# Patient Record
Sex: FEMALE | Race: WHITE | NOT HISPANIC OR LATINO | Employment: OTHER | RURAL
[De-identification: names, ages, dates, MRNs, and addresses within clinical notes are randomized per-mention and may not be internally consistent; named-entity substitution may affect disease eponyms.]

---

## 2017-05-03 ENCOUNTER — OFFICE VISIT (OUTPATIENT)
Dept: NEUROLOGY | Facility: CLINIC | Age: 47
End: 2017-05-03

## 2017-05-03 VITALS
SYSTOLIC BLOOD PRESSURE: 132 MMHG | HEIGHT: 63 IN | OXYGEN SATURATION: 100 % | WEIGHT: 130 LBS | HEART RATE: 75 BPM | DIASTOLIC BLOOD PRESSURE: 93 MMHG | BODY MASS INDEX: 23.04 KG/M2

## 2017-05-03 DIAGNOSIS — F51.04 CHRONIC INSOMNIA: ICD-10-CM

## 2017-05-03 DIAGNOSIS — R41.3 MEMORY LOSS: Primary | ICD-10-CM

## 2017-05-03 DIAGNOSIS — G89.4 CHRONIC PAIN SYNDROME: ICD-10-CM

## 2017-05-03 PROCEDURE — 99213 OFFICE O/P EST LOW 20 MIN: CPT | Performed by: PSYCHIATRY & NEUROLOGY

## 2017-05-03 RX ORDER — NORTRIPTYLINE HYDROCHLORIDE 25 MG/1
25 CAPSULE ORAL NIGHTLY
Qty: 30 CAPSULE | Refills: 11 | Status: SHIPPED | OUTPATIENT
Start: 2017-05-03 | End: 2017-11-06 | Stop reason: SDUPTHER

## 2017-05-18 ENCOUNTER — HOSPITAL ENCOUNTER (OUTPATIENT)
Dept: GENERAL RADIOLOGY | Age: 47
Discharge: HOME | End: 2017-05-18
Attending: NURSE PRACTITIONER | Admitting: NURSE PRACTITIONER

## 2017-05-18 DIAGNOSIS — J01.91 ACUTE RECURRENT SINUSITIS: ICD-10-CM

## 2017-07-14 ENCOUNTER — TELEPHONE (OUTPATIENT)
Dept: NEUROLOGY | Facility: CLINIC | Age: 47
End: 2017-07-14

## 2017-07-17 DIAGNOSIS — G89.4 CHRONIC PAIN SYNDROME: ICD-10-CM

## 2017-07-17 RX ORDER — GABAPENTIN 800 MG/1
800 TABLET ORAL 4 TIMES DAILY
Qty: 120 TABLET | Refills: 3 | OUTPATIENT
Start: 2017-07-17 | End: 2018-10-31 | Stop reason: SDUPTHER

## 2017-07-17 NOTE — TELEPHONE ENCOUNTER
PER DR. AURELIO MELARA TO CALL IN GABAPENTIN UNTIL NEXT APPT.     PT WAS INFORMED THAT WE WILL NOT REFILL CONTROLLED MEDICATIONS OVER THE PHONE OR THROUGH PHARMACY REQUESTS ANYMORE. PT UNDERSTANDS THAT THEY MUST MAKE THEIR FOLLOW UP APPT IN ORDER TO CONTINUE TO RECEIVE THEIR CONTROLLED MEDICATION. PT VERBALIZED UNDERSTANDING.   SHE UNDERSTANDS THAT THIS WILL BE A ONE TIME THING ONLY

## 2017-08-10 DIAGNOSIS — E53.8 B12 DEFICIENCY: ICD-10-CM

## 2017-08-10 RX ORDER — CYANOCOBALAMIN (VITAMIN B-12) 1000 MCG
TABLET, EXTENDED RELEASE ORAL
Qty: 30 EACH | Refills: 2 | Status: SHIPPED | OUTPATIENT
Start: 2017-08-10 | End: 2017-11-06 | Stop reason: ALTCHOICE

## 2017-11-06 ENCOUNTER — OFFICE VISIT (OUTPATIENT)
Dept: NEUROLOGY | Facility: CLINIC | Age: 47
End: 2017-11-06

## 2017-11-06 VITALS
OXYGEN SATURATION: 100 % | WEIGHT: 125 LBS | DIASTOLIC BLOOD PRESSURE: 85 MMHG | HEIGHT: 63 IN | SYSTOLIC BLOOD PRESSURE: 125 MMHG | BODY MASS INDEX: 22.15 KG/M2 | HEART RATE: 75 BPM

## 2017-11-06 DIAGNOSIS — R41.3 LOM (LOSS OF MEMORY): Primary | ICD-10-CM

## 2017-11-06 DIAGNOSIS — F51.04 CHRONIC INSOMNIA: ICD-10-CM

## 2017-11-06 DIAGNOSIS — E53.8 B12 DEFICIENCY: ICD-10-CM

## 2017-11-06 PROCEDURE — 99213 OFFICE O/P EST LOW 20 MIN: CPT | Performed by: PSYCHIATRY & NEUROLOGY

## 2017-11-06 RX ORDER — NORTRIPTYLINE HYDROCHLORIDE 25 MG/1
25 CAPSULE ORAL NIGHTLY
Qty: 30 CAPSULE | Refills: 11 | Status: SHIPPED | OUTPATIENT
Start: 2017-11-06 | End: 2018-10-31 | Stop reason: SDUPTHER

## 2017-11-06 NOTE — PROGRESS NOTES
Subjective:     Patient ID: Nely Castrejon is a 47 y.o. female.    CC:   Chief Complaint   Patient presents with   • Memory Loss       HPI:   History of Present Illness  The following portions of the patient's history were reviewed and updated as appropriate: allergies, current medications, past family history, past medical history, past social history, past surgical history and problem list.     Poor memory unchanged, has not had neuropsych testing yet, sleeping some better in pain management for chronic neck and low back pain.    Past Medical History:   Diagnosis Date   • Arthritis    • History of osteoarthritis 8/1/2016   • Primary stabbing headache    • Sick sinus syndrome    • Vitamin B12 deficiency        Past Surgical History:   Procedure Laterality Date   • CHOLECYSTECTOMY     • PARTIAL HYSTERECTOMY     • SHOULDER SURGERY      left       Social History     Social History   • Marital status:      Spouse name: N/A   • Number of children: N/A   • Years of education: N/A     Occupational History   • Not on file.     Social History Main Topics   • Smoking status: Never Smoker   • Smokeless tobacco: Not on file   • Alcohol use Not on file   • Drug use: Not on file   • Sexual activity: Defer     Other Topics Concern   • Not on file     Social History Narrative       No family history on file.     Review of Systems   Constitutional: Positive for fatigue. Negative for chills, fever and unexpected weight change.   HENT: Negative for ear pain, hearing loss, nosebleeds, rhinorrhea and sore throat.    Eyes: Positive for pain. Negative for photophobia, discharge, itching and visual disturbance.   Respiratory: Negative.  Negative for cough, chest tightness, shortness of breath and wheezing.    Cardiovascular: Negative.  Negative for chest pain, palpitations and leg swelling.   Gastrointestinal: Negative.  Negative for abdominal pain, blood in stool, constipation, diarrhea, nausea and vomiting.   Endocrine: Positive  for heat intolerance.   Genitourinary: Negative.  Negative for dysuria, frequency, hematuria and urgency.   Musculoskeletal: Positive for arthralgias, back pain, myalgias, neck pain and neck stiffness. Negative for gait problem and joint swelling.   Skin: Negative.  Negative for rash and wound.   Allergic/Immunologic: Negative.  Negative for environmental allergies and food allergies.   Neurological: Positive for numbness and headaches. Negative for dizziness, tremors, seizures, syncope, speech difficulty, weakness and light-headedness.   Hematological: Negative.  Negative for adenopathy. Does not bruise/bleed easily.   Psychiatric/Behavioral: Positive for confusion, decreased concentration and sleep disturbance. Negative for agitation, hallucinations and suicidal ideas. The patient is not nervous/anxious.         Objective:    Neurologic Exam     Mental Status   Oriented to person, place, and time.       Physical Exam   Constitutional: She is oriented to person, place, and time. She appears well-developed and well-nourished.   Cardiovascular: Normal rate and regular rhythm.    Pulmonary/Chest: Effort normal.   Neurological: She is alert and oriented to person, place, and time. She has normal reflexes.   Psychiatric: She has a normal mood and affect. Her behavior is normal. Thought content normal.       Assessment/Plan:       Nely was seen today for memory loss.    Diagnoses and all orders for this visit:    LOM (loss of memory)  -     NeuroPsych Testing    Chronic insomnia  -     nortriptyline (PAMELOR) 25 MG capsule; Take 1 capsule by mouth Every Night.    B12 deficiency  -     cyanocobalamin ([employer] VITAMIN B-12) 1000 MCG tablet; Take 1 tablet by mouth Daily.           Shimon Cortez MD  11/6/2017

## 2017-12-29 ENCOUNTER — HOSPITAL ENCOUNTER (OUTPATIENT)
Dept: OTHER | Age: 47
Discharge: HOME | End: 2017-12-29
Attending: NURSE PRACTITIONER | Admitting: NURSE PRACTITIONER

## 2017-12-29 DIAGNOSIS — J01.00 ACUTE MAXILLARY SINUSITIS, RECURRENCE NOT SPECIFIED: ICD-10-CM

## 2017-12-29 LAB
A/G RATIO: 1.9 (ref 0.8–2)
ALBUMIN SERPL-MCNC: 4.2 G/DL (ref 3.4–4.8)
ALP BLD-CCNC: 68 U/L (ref 25–100)
ALT SERPL-CCNC: 14 U/L (ref 4–36)
ANION GAP SERPL CALCULATED.3IONS-SCNC: 9 MMOL/L (ref 3–16)
AST SERPL-CCNC: 17 U/L (ref 8–33)
BASOPHILS ABSOLUTE: 0 K/UL (ref 0–0.1)
BASOPHILS RELATIVE PERCENT: 0.7 %
BILIRUB SERPL-MCNC: <0.2 MG/DL (ref 0.3–1.2)
BUN BLDV-MCNC: 17 MG/DL (ref 6–20)
CALCIUM SERPL-MCNC: 9.1 MG/DL (ref 8.5–10.5)
CHLORIDE BLD-SCNC: 101 MMOL/L (ref 98–107)
CO2: 30 MMOL/L (ref 20–30)
CREAT SERPL-MCNC: 0.6 MG/DL (ref 0.4–1.2)
EOSINOPHILS ABSOLUTE: 0.1 K/UL (ref 0–0.4)
EOSINOPHILS RELATIVE PERCENT: 1.5 %
GFR AFRICAN AMERICAN: >59
GFR NON-AFRICAN AMERICAN: >60
GLOBULIN: 2.2 G/DL
GLUCOSE BLD-MCNC: 97 MG/DL (ref 74–106)
HCT VFR BLD CALC: 38.5 % (ref 37–47)
HEMOGLOBIN: 11.9 G/DL (ref 11.5–16.5)
IMMATURE GRANULOCYTES #: 0
IMMATURE GRANULOCYTES %: 0.2 % (ref 0–5)
LYMPHOCYTES ABSOLUTE: 1.4 K/UL (ref 1.5–4)
LYMPHOCYTES RELATIVE PERCENT: 31.1 %
MCH RBC QN AUTO: 28.5 PG (ref 27–32)
MCHC RBC AUTO-ENTMCNC: 30.9 G/DL (ref 31–35)
MCV RBC AUTO: 92.1 FL (ref 80–100)
MONOCYTES ABSOLUTE: 0.3 K/UL (ref 0.2–0.8)
MONOCYTES RELATIVE PERCENT: 7 %
NEUTROPHILS ABSOLUTE: 2.7 K/UL (ref 2–7.5)
NEUTROPHILS RELATIVE PERCENT: 59.5 %
PDW BLD-RTO: 13.3 % (ref 11–16)
PLATELET # BLD: 192 K/UL (ref 150–400)
PMV BLD AUTO: 12 FL (ref 6–10)
POTASSIUM SERPL-SCNC: 4.4 MMOL/L (ref 3.4–5.1)
RBC # BLD: 4.18 M/UL (ref 3.8–5.8)
SODIUM BLD-SCNC: 140 MMOL/L (ref 136–145)
TOTAL PROTEIN: 6.4 G/DL (ref 6.4–8.3)
WBC # BLD: 4.6 K/UL (ref 4–11)

## 2018-04-09 ENCOUNTER — HOSPITAL ENCOUNTER (OUTPATIENT)
Dept: OTHER | Age: 48
Discharge: HOME | End: 2018-04-09
Attending: NURSE PRACTITIONER | Admitting: NURSE PRACTITIONER

## 2018-04-09 LAB
RAPID INFLUENZA  B AGN: NEGATIVE
RAPID INFLUENZA A AGN: NEGATIVE

## 2018-07-27 ENCOUNTER — HOSPITAL ENCOUNTER (OUTPATIENT)
Facility: HOSPITAL | Age: 48
Discharge: HOME | End: 2018-07-27
Payer: MEDICAID

## 2018-07-27 LAB
A/G RATIO: 1.9 (ref 0.8–2)
ALBUMIN SERPL-MCNC: 4.6 G/DL (ref 3.4–4.8)
ALP BLD-CCNC: 76 U/L (ref 25–100)
ALT SERPL-CCNC: 13 U/L (ref 4–36)
ANION GAP SERPL CALCULATED.3IONS-SCNC: 12 MMOL/L (ref 3–16)
AST SERPL-CCNC: 18 U/L (ref 8–33)
BASOPHILS ABSOLUTE: 0 K/UL (ref 0–0.1)
BASOPHILS RELATIVE PERCENT: 0.4 %
BILIRUB SERPL-MCNC: <0.2 MG/DL (ref 0.3–1.2)
BUN BLDV-MCNC: 16 MG/DL (ref 6–20)
CALCIUM SERPL-MCNC: 9.7 MG/DL (ref 8.5–10.5)
CHLORIDE BLD-SCNC: 100 MMOL/L (ref 98–107)
CHOLESTEROL, TOTAL: 191 MG/DL (ref 0–200)
CO2: 29 MMOL/L (ref 20–30)
CREAT SERPL-MCNC: 0.8 MG/DL (ref 0.4–1.2)
EOSINOPHILS ABSOLUTE: 0.1 K/UL (ref 0–0.4)
EOSINOPHILS RELATIVE PERCENT: 1.1 %
FOLATE: 18.35 NG/ML
GFR AFRICAN AMERICAN: >59
GFR NON-AFRICAN AMERICAN: >60
GLOBULIN: 2.4 G/DL
GLUCOSE BLD-MCNC: 87 MG/DL (ref 74–106)
HCT VFR BLD CALC: 40.1 % (ref 37–47)
HDLC SERPL-MCNC: 76 MG/DL (ref 40–60)
HEMOGLOBIN: 12.7 G/DL (ref 11.5–16.5)
IMMATURE GRANULOCYTES #: 0 K/UL
IMMATURE GRANULOCYTES %: 0.1 % (ref 0–5)
LDL CHOLESTEROL CALCULATED: 78 MG/DL
LYMPHOCYTES ABSOLUTE: 1.8 K/UL (ref 1.5–4)
LYMPHOCYTES RELATIVE PERCENT: 25.7 %
MCH RBC QN AUTO: 28.4 PG (ref 27–32)
MCHC RBC AUTO-ENTMCNC: 31.7 G/DL (ref 31–35)
MCV RBC AUTO: 89.7 FL (ref 80–100)
MONOCYTES ABSOLUTE: 0.7 K/UL (ref 0.2–0.8)
MONOCYTES RELATIVE PERCENT: 9.2 %
NEUTROPHILS ABSOLUTE: 4.5 K/UL (ref 2–7.5)
NEUTROPHILS RELATIVE PERCENT: 63.5 %
PDW BLD-RTO: 12.7 % (ref 11–16)
PLATELET # BLD: 210 K/UL (ref 150–400)
PMV BLD AUTO: 12 FL (ref 6–10)
POTASSIUM SERPL-SCNC: 3.9 MMOL/L (ref 3.4–5.1)
RBC # BLD: 4.47 M/UL (ref 3.8–5.8)
SODIUM BLD-SCNC: 141 MMOL/L (ref 136–145)
TOTAL PROTEIN: 7 G/DL (ref 6.4–8.3)
TRIGL SERPL-MCNC: 186 MG/DL (ref 0–249)
TSH SERPL DL<=0.05 MIU/L-ACNC: 1.81 UIU/ML (ref 0.35–5.5)
VITAMIN B-12: 1371 PG/ML (ref 211–911)
VLDLC SERPL CALC-MCNC: 37 MG/DL
WBC # BLD: 7 K/UL (ref 4–11)

## 2018-07-27 PROCEDURE — 84443 ASSAY THYROID STIM HORMONE: CPT

## 2018-07-27 PROCEDURE — 80061 LIPID PANEL: CPT

## 2018-07-27 PROCEDURE — 80053 COMPREHEN METABOLIC PANEL: CPT

## 2018-07-27 PROCEDURE — 36415 COLL VENOUS BLD VENIPUNCTURE: CPT

## 2018-07-27 PROCEDURE — 85025 COMPLETE CBC W/AUTO DIFF WBC: CPT

## 2018-07-27 PROCEDURE — 82607 VITAMIN B-12: CPT

## 2018-07-27 PROCEDURE — 82746 ASSAY OF FOLIC ACID SERUM: CPT

## 2018-10-31 ENCOUNTER — HOSPITAL ENCOUNTER (OUTPATIENT)
Facility: HOSPITAL | Age: 48
Discharge: HOME | End: 2018-10-31
Payer: MEDICAID

## 2018-10-31 ENCOUNTER — OFFICE VISIT (OUTPATIENT)
Dept: NEUROLOGY | Facility: CLINIC | Age: 48
End: 2018-10-31

## 2018-10-31 VITALS
HEIGHT: 63 IN | SYSTOLIC BLOOD PRESSURE: 103 MMHG | BODY MASS INDEX: 23.04 KG/M2 | DIASTOLIC BLOOD PRESSURE: 63 MMHG | HEART RATE: 80 BPM | WEIGHT: 130 LBS

## 2018-10-31 DIAGNOSIS — G89.4 CHRONIC PAIN SYNDROME: ICD-10-CM

## 2018-10-31 DIAGNOSIS — F51.04 CHRONIC INSOMNIA: ICD-10-CM

## 2018-10-31 DIAGNOSIS — G89.29 CHRONIC NECK AND BACK PAIN: ICD-10-CM

## 2018-10-31 DIAGNOSIS — F07.81 POSTCONCUSSION SYNDROME: ICD-10-CM

## 2018-10-31 DIAGNOSIS — Z51.81 MEDICATION MONITORING ENCOUNTER: ICD-10-CM

## 2018-10-31 DIAGNOSIS — R41.82 ALTERED MENTAL STATUS, UNSPECIFIED ALTERED MENTAL STATUS TYPE: ICD-10-CM

## 2018-10-31 DIAGNOSIS — G43.109 MIGRAINE WITH AURA AND WITHOUT STATUS MIGRAINOSUS, NOT INTRACTABLE: Primary | ICD-10-CM

## 2018-10-31 DIAGNOSIS — E53.8 B12 DEFICIENCY: ICD-10-CM

## 2018-10-31 DIAGNOSIS — M54.9 CHRONIC NECK AND BACK PAIN: ICD-10-CM

## 2018-10-31 DIAGNOSIS — M54.2 CHRONIC NECK AND BACK PAIN: ICD-10-CM

## 2018-10-31 PROCEDURE — 80307 DRUG TEST PRSMV CHEM ANLYZR: CPT

## 2018-10-31 PROCEDURE — 80171 DRUG SCREEN QUANT GABAPENTIN: CPT

## 2018-10-31 PROCEDURE — G0480 DRUG TEST DEF 1-7 CLASSES: HCPCS

## 2018-10-31 PROCEDURE — 99214 OFFICE O/P EST MOD 30 MIN: CPT | Performed by: PSYCHIATRY & NEUROLOGY

## 2018-10-31 RX ORDER — GABAPENTIN 800 MG/1
800 TABLET ORAL 4 TIMES DAILY
Qty: 120 TABLET | Refills: 5 | OUTPATIENT
Start: 2018-10-31 | End: 2019-05-01 | Stop reason: SDUPTHER

## 2018-10-31 RX ORDER — BACLOFEN 10 MG/1
20 TABLET ORAL 3 TIMES DAILY
Qty: 120 TABLET | Refills: 11 | Status: SHIPPED | OUTPATIENT
Start: 2018-10-31 | End: 2019-10-24 | Stop reason: SDUPTHER

## 2018-10-31 RX ORDER — MELOXICAM 7.5 MG/1
7.5 TABLET ORAL DAILY
Qty: 30 TABLET | Refills: 11 | Status: SHIPPED | OUTPATIENT
Start: 2018-10-31 | End: 2019-10-24 | Stop reason: SDUPTHER

## 2018-10-31 RX ORDER — NORTRIPTYLINE HYDROCHLORIDE 50 MG/1
50 CAPSULE ORAL NIGHTLY
Qty: 30 CAPSULE | Refills: 11 | Status: SHIPPED | OUTPATIENT
Start: 2018-10-31 | End: 2019-05-01 | Stop reason: SDUPTHER

## 2018-10-31 NOTE — PROGRESS NOTES
Subjective:     Patient ID: Nely Castrejon is a 48 y.o. female.    CC: No chief complaint on file.      HPI:   History of Present Illness  The following portions of the patient's history were reviewed and updated as appropriate: allergies, current medications, past family history, past medical history, past social history, past surgical history and problem list.     Patient has a spell of memory loss that lasted for 5-6 days about two months ago, was very confused, denies any triggers, anxiety, stress, illness. She still has intermittent headaches. She has had a normal head CT and EEG in the past, can not have MRI because of PCM. She has chronic neck and low back pain, DJD of spine, followed by pain clinic. She has missed a pill count there, not sure if they will see her again, wants me to write her gabapentin. She is applying for disability.    Past Medical History:   Diagnosis Date   • Arthritis    • History of osteoarthritis 8/1/2016   • Primary stabbing headache    • Sick sinus syndrome (CMS/HCC)    • Vitamin B12 deficiency        Past Surgical History:   Procedure Laterality Date   • CHOLECYSTECTOMY     • PARTIAL HYSTERECTOMY     • SHOULDER SURGERY      left       Social History     Social History   • Marital status:      Spouse name: N/A   • Number of children: N/A   • Years of education: N/A     Occupational History   • Not on file.     Social History Main Topics   • Smoking status: Never Smoker   • Smokeless tobacco: Not on file   • Alcohol use Not on file   • Drug use: Unknown   • Sexual activity: Defer     Other Topics Concern   • Not on file     Social History Narrative   • No narrative on file       No family history on file.     Review of Systems   Constitutional: Positive for fatigue. Negative for chills, fever and unexpected weight change.   HENT: Negative for ear pain, hearing loss, nosebleeds, rhinorrhea and sore throat.    Eyes: Negative for photophobia, pain, discharge, itching and visual  disturbance.   Respiratory: Negative for cough, chest tightness, shortness of breath and wheezing.    Cardiovascular: Negative for chest pain, palpitations and leg swelling.   Gastrointestinal: Negative for abdominal pain, blood in stool, constipation, diarrhea, nausea and vomiting.   Genitourinary: Negative for dysuria, frequency, hematuria and urgency.   Musculoskeletal: Positive for gait problem, myalgias and neck stiffness. Negative for arthralgias, back pain, joint swelling and neck pain.   Skin: Negative for rash and wound.   Allergic/Immunologic: Negative for environmental allergies and food allergies.   Neurological: Positive for light-headedness and numbness. Negative for dizziness, tremors, seizures, syncope, speech difficulty, weakness and headaches.   Hematological: Negative for adenopathy. Does not bruise/bleed easily.   Psychiatric/Behavioral: Positive for decreased concentration and sleep disturbance. Negative for agitation, confusion, hallucinations and suicidal ideas. The patient is not nervous/anxious.         Objective:    Neurologic Exam     Mental Status   Speech: speech is normal       Physical Exam   Constitutional: She appears well-developed and well-nourished.   Cardiovascular: Normal rate and regular rhythm.    Pulmonary/Chest: Effort normal.   Neurological: She is alert. She has normal reflexes. She displays normal reflexes. No cranial nerve deficit or sensory deficit. She exhibits normal muscle tone. Coordination normal.   Psychiatric: Her speech is normal and behavior is normal. Thought content normal. Her affect is blunt.       Assessment/Plan:       Diagnoses and all orders for this visit:    Migraine with aura and without status migrainosus, not intractable    Chronic insomnia  -     nortriptyline (PAMELOR) 50 MG capsule; Take 1 capsule by mouth Every Night.    B12 deficiency  -     cyanocobalamin ([facility] VITAMIN B-12) 1000 MCG tablet; Take 1 tablet by mouth Daily.    Chronic pain  syndrome  -     gabapentin (NEURONTIN) 800 MG tablet; Take 1 tablet by mouth 4 (Four) Times a Day.    Postconcussion syndrome  -     NeuroPsych Testing    Chronic neck and back pain  -     baclofen (LIORESAL) 10 MG tablet; Take 2 tablets by mouth 3 (Three) Times a Day.  -     meloxicam (MOBIC) 7.5 MG tablet; Take 1 tablet by mouth Daily.    Medication monitoring encounter  -     Drug Screen (10), Serum  -     Gabapentin Level    Altered mental status, unspecified altered mental status type, resolved       -      Suspect prolonged migraine aura vs stress related vs medication related.     In my professional opinion, the patient is permanently disabled because of postconcussion syndrome, chronic neck and low back pain. She would need neuropsych and FCE testing for specifics.     The patient has read and signed the [facility] [place] Controlled Substance Contract.  I will continue to see patient for regular follow up appointments.  They are well controlled on their medication.  GRISEL is updated every 3 months. The patient is aware of the potential for addiction and dependence.      Shimon Cortez MD  10/31/2018

## 2018-11-02 ENCOUNTER — TELEPHONE (OUTPATIENT)
Dept: SURGERY | Facility: CLINIC | Age: 48
End: 2018-11-02

## 2018-11-02 NOTE — TELEPHONE ENCOUNTER
Patient is due for a 5 year recall colon.  She stated her daughter was in ICU and would call us back

## 2018-11-03 LAB
AMPHETAMINES SCREEN BLOOD: NEGATIVE NG/ML
BARBITURATES SCREEN BLOOD: NEGATIVE NG/ML
BENZODIAZEPINES SCREEN BLOOD: NEGATIVE NG/ML
BUPRENORPHINE: NEGATIVE NG/ML
CANNABINOID SCREEN BLOOD: NEGATIVE NG/ML
COCAINE SCREEN BLOOD: NEGATIVE NG/ML
Lab: NORMAL
METHADONE SCREEN BLOOD: NEGATIVE NG/ML
METHAMPHETAMINES SERUM/ PLASMA: NEGATIVE NG/ML
OPIATES SCREEN BLOOD: NEGATIVE NG/ML
OXYCODONE: POSITIVE NG/ML
PHENCYCLIDINE SCREEN BLOOD: NEGATIVE NG/ML

## 2018-11-04 LAB — GABAPENTIN LVL: 4.4 UG/ML (ref 2–20)

## 2018-11-06 LAB
OPIATES, HYDROCODONE: <2 NG/ML
OPIATES, HYDROMORPHONE: <2 NG/ML
OPIATES, OXYCODONE: 18 NG/ML
OPIATES, OXYMORPHONE: <2 NG/ML
OPIATES, SER/ PLASMA CODEINE: <2 NG/ML
OPIATES, SER/PLAS: <2 NG/ML
OPITATES, MORPHINE: <2 NG/ML

## 2018-11-09 ENCOUNTER — TELEPHONE (OUTPATIENT)
Dept: NEUROLOGY | Facility: CLINIC | Age: 48
End: 2018-11-09

## 2018-11-09 NOTE — TELEPHONE ENCOUNTER
Per Dr. Cortez since pt completed blood drug screen after appt as ordered it is okay to call in one month of their controlled medications. After receiving the rest of the blood work Dr. Cortez will decide whether to call in refills.  Pt was informed.

## 2018-12-03 ENCOUNTER — TELEPHONE (OUTPATIENT)
Dept: NEUROLOGY | Facility: CLINIC | Age: 48
End: 2018-12-03

## 2018-12-03 NOTE — TELEPHONE ENCOUNTER
Pt was ordered to take a UDS after their most recent office visit.     Attached are the Results W/ CONF AND GABAPENTIN LEVEL.    Please advise.

## 2019-01-28 ENCOUNTER — HOSPITAL ENCOUNTER (OUTPATIENT)
Facility: HOSPITAL | Age: 49
Discharge: HOME | End: 2019-01-28
Payer: MEDICAID

## 2019-01-28 LAB
ANION GAP SERPL CALCULATED.3IONS-SCNC: 10 MMOL/L (ref 3–16)
BASOPHILS ABSOLUTE: 0 K/UL (ref 0–0.1)
BASOPHILS RELATIVE PERCENT: 0.4 %
BUN BLDV-MCNC: 17 MG/DL (ref 6–20)
CALCIUM SERPL-MCNC: 9.8 MG/DL (ref 8.5–10.5)
CHLORIDE BLD-SCNC: 98 MMOL/L (ref 98–107)
CHOLESTEROL, TOTAL: 219 MG/DL (ref 0–200)
CO2: 30 MMOL/L (ref 20–30)
CREAT SERPL-MCNC: 1 MG/DL (ref 0.4–1.2)
EOSINOPHILS ABSOLUTE: 0.1 K/UL (ref 0–0.4)
EOSINOPHILS RELATIVE PERCENT: 0.7 %
GFR AFRICAN AMERICAN: >59
GFR NON-AFRICAN AMERICAN: 59
GLUCOSE BLD-MCNC: 91 MG/DL (ref 74–106)
HBA1C MFR BLD: 5.7 %
HCT VFR BLD CALC: 38.3 % (ref 37–47)
HDLC SERPL-MCNC: 90 MG/DL (ref 40–60)
HEMOGLOBIN: 12.4 G/DL (ref 11.5–16.5)
IMMATURE GRANULOCYTES #: 0 K/UL
IMMATURE GRANULOCYTES %: 0.5 % (ref 0–5)
LDL CHOLESTEROL CALCULATED: 98 MG/DL
LYMPHOCYTES ABSOLUTE: 2.1 K/UL (ref 1.5–4)
LYMPHOCYTES RELATIVE PERCENT: 25.3 %
MCH RBC QN AUTO: 29.2 PG (ref 27–32)
MCHC RBC AUTO-ENTMCNC: 32.4 G/DL (ref 31–35)
MCV RBC AUTO: 90.3 FL (ref 80–100)
MONOCYTES ABSOLUTE: 0.7 K/UL (ref 0.2–0.8)
MONOCYTES RELATIVE PERCENT: 8.9 %
NEUTROPHILS ABSOLUTE: 5.3 K/UL (ref 2–7.5)
NEUTROPHILS RELATIVE PERCENT: 64.2 %
PDW BLD-RTO: 12.7 % (ref 11–16)
PLATELET # BLD: 244 K/UL (ref 150–400)
PMV BLD AUTO: 11.4 FL (ref 6–10)
POTASSIUM SERPL-SCNC: 4.9 MMOL/L (ref 3.4–5.1)
RBC # BLD: 4.24 M/UL (ref 3.8–5.8)
SODIUM BLD-SCNC: 138 MMOL/L (ref 136–145)
TRIGL SERPL-MCNC: 155 MG/DL (ref 0–249)
TSH SERPL DL<=0.05 MIU/L-ACNC: 3.33 UIU/ML (ref 0.35–5.5)
VLDLC SERPL CALC-MCNC: 31 MG/DL
WBC # BLD: 8.2 K/UL (ref 4–11)

## 2019-01-28 PROCEDURE — 80061 LIPID PANEL: CPT

## 2019-01-28 PROCEDURE — 85025 COMPLETE CBC W/AUTO DIFF WBC: CPT

## 2019-01-28 PROCEDURE — 84443 ASSAY THYROID STIM HORMONE: CPT

## 2019-01-28 PROCEDURE — 83036 HEMOGLOBIN GLYCOSYLATED A1C: CPT

## 2019-01-28 PROCEDURE — 80048 BASIC METABOLIC PNL TOTAL CA: CPT

## 2019-03-05 DIAGNOSIS — G89.4 CHRONIC PAIN SYNDROME: ICD-10-CM

## 2019-03-05 RX ORDER — GABAPENTIN 800 MG/1
TABLET ORAL
Qty: 120 TABLET | OUTPATIENT
Start: 2019-03-05

## 2019-03-14 ENCOUNTER — TRANSCRIBE ORDERS (OUTPATIENT)
Dept: ADMINISTRATIVE | Facility: HOSPITAL | Age: 49
End: 2019-03-14

## 2019-03-14 DIAGNOSIS — I27.20 PULMONARY HYPERTENSION (HCC): Primary | ICD-10-CM

## 2019-03-18 ENCOUNTER — TELEPHONE (OUTPATIENT)
Dept: NEUROLOGY | Facility: CLINIC | Age: 49
End: 2019-03-18

## 2019-03-18 ENCOUNTER — HOSPITAL ENCOUNTER (OUTPATIENT)
Dept: GENERAL RADIOLOGY | Facility: HOSPITAL | Age: 49
Discharge: HOME | End: 2019-03-18
Admitting: INTERNAL MEDICINE

## 2019-03-18 ENCOUNTER — HOSPITAL ENCOUNTER (OUTPATIENT)
Dept: NUCLEAR MEDICINE | Facility: HOSPITAL | Age: 49
Discharge: HOME | End: 2019-03-18

## 2019-03-18 DIAGNOSIS — I27.20 PULMONARY HYPERTENSION (HCC): ICD-10-CM

## 2019-03-18 DIAGNOSIS — M79.18 MYOFASCIAL PAIN SYNDROME: ICD-10-CM

## 2019-03-18 DIAGNOSIS — M54.16 LUMBAR RADICULOPATHY: Primary | ICD-10-CM

## 2019-03-18 PROCEDURE — A9540 TC99M MAA: HCPCS | Performed by: INTERNAL MEDICINE

## 2019-03-18 PROCEDURE — 0 TECHNETIUM TC 99M PENTETATE KIT: Performed by: INTERNAL MEDICINE

## 2019-03-18 PROCEDURE — 78582 LUNG VENTILAT&PERFUS IMAGING: CPT

## 2019-03-18 PROCEDURE — 0 TECHNETIUM ALBUMIN AGGREGATED: Performed by: INTERNAL MEDICINE

## 2019-03-18 PROCEDURE — A9539 TC99M PENTETATE: HCPCS | Performed by: INTERNAL MEDICINE

## 2019-03-18 PROCEDURE — 71046 X-RAY EXAM CHEST 2 VIEWS: CPT

## 2019-03-18 RX ADMIN — Medication 1 DOSE: at 07:37

## 2019-03-18 RX ADMIN — KIT FOR THE PREPARATION OF TECHNETIUM TC 99M PENTETATE 1 DOSE: 20 INJECTION, POWDER, LYOPHILIZED, FOR SOLUTION INTRAVENOUS; RESPIRATORY (INHALATION) at 07:00

## 2019-05-01 ENCOUNTER — OFFICE VISIT (OUTPATIENT)
Dept: NEUROLOGY | Facility: CLINIC | Age: 49
End: 2019-05-01

## 2019-05-01 VITALS
WEIGHT: 125 LBS | HEIGHT: 63 IN | BODY MASS INDEX: 22.15 KG/M2 | SYSTOLIC BLOOD PRESSURE: 136 MMHG | DIASTOLIC BLOOD PRESSURE: 85 MMHG | HEART RATE: 98 BPM

## 2019-05-01 DIAGNOSIS — G89.4 CHRONIC PAIN SYNDROME: ICD-10-CM

## 2019-05-01 DIAGNOSIS — F51.04 CHRONIC INSOMNIA: ICD-10-CM

## 2019-05-01 DIAGNOSIS — R41.0 DISORIENTATION: Primary | ICD-10-CM

## 2019-05-01 PROCEDURE — 99214 OFFICE O/P EST MOD 30 MIN: CPT | Performed by: PSYCHIATRY & NEUROLOGY

## 2019-05-01 RX ORDER — NORTRIPTYLINE HYDROCHLORIDE 25 MG/1
25 CAPSULE ORAL NIGHTLY
Qty: 30 CAPSULE | Refills: 5 | Status: SHIPPED | OUTPATIENT
Start: 2019-05-01 | End: 2019-10-24 | Stop reason: SDUPTHER

## 2019-05-01 RX ORDER — GABAPENTIN 600 MG/1
600 TABLET ORAL 4 TIMES DAILY
Qty: 120 TABLET | Refills: 5
Start: 2019-05-01 | End: 2020-12-08 | Stop reason: SDUPTHER

## 2019-05-01 NOTE — PROGRESS NOTES
Subjective:     Patient ID: Nely Castrejon is a 49 y.o. female.    CC:   Chief Complaint   Patient presents with   • Migraine       HPI:   History of Present Illness  The following portions of the patient's history were reviewed and updated as appropriate: allergies, current medications, past family history, past medical history, past social history, past surgical history and problem list.     Patient reports that she been more confused and forgetful lately, recently started on Celexa and prednisone, prior extensive work up including CTH and EEG negative, can not get an MRI because of PPM.    Past Medical History:   Diagnosis Date   • Arthritis    • History of osteoarthritis 8/1/2016   • Primary stabbing headache    • Sick sinus syndrome (CMS/HCC)    • Vitamin B12 deficiency        Past Surgical History:   Procedure Laterality Date   • CHOLECYSTECTOMY     • PARTIAL HYSTERECTOMY     • SHOULDER SURGERY      left       Social History     Socioeconomic History   • Marital status:      Spouse name: Not on file   • Number of children: Not on file   • Years of education: Not on file   • Highest education level: Not on file   Tobacco Use   • Smoking status: Never Smoker   Substance and Sexual Activity   • Sexual activity: Defer       No family history on file.     Review of Systems   Constitutional: Negative for chills, fatigue, fever and unexpected weight change.   HENT: Negative for ear pain, hearing loss, nosebleeds, rhinorrhea and sore throat.    Eyes: Negative for photophobia, pain, discharge, itching and visual disturbance.   Respiratory: Negative for cough, chest tightness, shortness of breath and wheezing.    Cardiovascular: Negative for chest pain, palpitations and leg swelling.   Gastrointestinal: Negative for abdominal pain, blood in stool, constipation, diarrhea, nausea and vomiting.   Genitourinary: Negative for dysuria, frequency, hematuria and urgency.   Musculoskeletal: Negative for arthralgias, back  pain, gait problem, joint swelling, myalgias, neck pain and neck stiffness.   Skin: Negative for rash and wound.   Allergic/Immunologic: Negative for environmental allergies and food allergies.   Neurological: Negative for dizziness, tremors, seizures, syncope, speech difficulty, weakness, light-headedness, numbness and headaches.   Hematological: Negative for adenopathy. Does not bruise/bleed easily.   Psychiatric/Behavioral: Positive for confusion, decreased concentration and sleep disturbance. Negative for agitation, hallucinations and suicidal ideas. The patient is nervous/anxious.         Objective:    Neurologic Exam     Mental Status   Oriented to person, place, and time.       Physical Exam   Constitutional: She is oriented to person, place, and time. She appears well-developed and well-nourished.   Cardiovascular: Normal rate and regular rhythm.   Pulmonary/Chest: Effort normal.   Neurological: She is alert and oriented to person, place, and time. She has normal reflexes.   Psychiatric: Her behavior is normal. Thought content normal. Her mood appears anxious.       Assessment/Plan:       Nely was seen today for migraine.    Diagnoses and all orders for this visit:    Disorientation, suspect role of polypharmacy, anxiety and stress.          -     Decrease nortriptyline and gabapentin dose as below.        -     F/U in 6-8 weeks, call for problems.    Chronic insomnia  -     nortriptyline (PAMELOR) 25 MG capsule; Take 1 capsule by mouth Every Night.    Chronic pain syndrome  -     gabapentin (NEURONTIN) 600 MG tablet; Take 1 tablet by mouth 4 (Four) Times a Day.             Shimon Cortez MD  5/1/2019

## 2019-08-09 ENCOUNTER — HOSPITAL ENCOUNTER (OUTPATIENT)
Dept: NEUROLOGY | Facility: HOSPITAL | Age: 49
Discharge: HOME | End: 2019-08-09
Payer: MEDICAID

## 2019-08-09 PROCEDURE — 95886 MUSC TEST DONE W/N TEST COMP: CPT

## 2019-08-09 PROCEDURE — 95910 NRV CNDJ TEST 7-8 STUDIES: CPT

## 2019-10-14 DIAGNOSIS — G89.29 CHRONIC NECK AND BACK PAIN: ICD-10-CM

## 2019-10-14 DIAGNOSIS — M54.9 CHRONIC NECK AND BACK PAIN: ICD-10-CM

## 2019-10-14 DIAGNOSIS — M54.2 CHRONIC NECK AND BACK PAIN: ICD-10-CM

## 2019-10-15 RX ORDER — BACLOFEN 10 MG/1
TABLET ORAL
Qty: 540 TABLET | Refills: 11 | OUTPATIENT
Start: 2019-10-15

## 2019-10-21 DIAGNOSIS — G89.29 CHRONIC NECK AND BACK PAIN: ICD-10-CM

## 2019-10-21 DIAGNOSIS — M54.9 CHRONIC NECK AND BACK PAIN: ICD-10-CM

## 2019-10-21 DIAGNOSIS — M54.2 CHRONIC NECK AND BACK PAIN: ICD-10-CM

## 2019-10-21 RX ORDER — BACLOFEN 10 MG/1
TABLET ORAL
Qty: 120 TABLET | Refills: 11 | OUTPATIENT
Start: 2019-10-21

## 2019-10-21 RX ORDER — MELOXICAM 7.5 MG/1
7.5 TABLET ORAL DAILY
Qty: 90 TABLET | Refills: 2 | OUTPATIENT
Start: 2019-10-21

## 2019-10-24 ENCOUNTER — OFFICE VISIT (OUTPATIENT)
Dept: NEUROLOGY | Facility: CLINIC | Age: 49
End: 2019-10-24

## 2019-10-24 ENCOUNTER — APPOINTMENT (OUTPATIENT)
Dept: LAB | Facility: HOSPITAL | Age: 49
End: 2019-10-24

## 2019-10-24 VITALS
HEART RATE: 88 BPM | WEIGHT: 141 LBS | BODY MASS INDEX: 24.98 KG/M2 | SYSTOLIC BLOOD PRESSURE: 118 MMHG | HEIGHT: 63 IN | DIASTOLIC BLOOD PRESSURE: 80 MMHG | OXYGEN SATURATION: 98 %

## 2019-10-24 DIAGNOSIS — M54.9 CHRONIC NECK AND BACK PAIN: ICD-10-CM

## 2019-10-24 DIAGNOSIS — G89.29 CHRONIC NECK AND BACK PAIN: ICD-10-CM

## 2019-10-24 DIAGNOSIS — F51.04 CHRONIC INSOMNIA: ICD-10-CM

## 2019-10-24 DIAGNOSIS — M54.2 CHRONIC NECK AND BACK PAIN: ICD-10-CM

## 2019-10-24 DIAGNOSIS — R41.82 ALTERED MENTAL STATUS, UNSPECIFIED ALTERED MENTAL STATUS TYPE: Primary | ICD-10-CM

## 2019-10-24 DIAGNOSIS — Z51.81 MEDICATION MONITORING ENCOUNTER: ICD-10-CM

## 2019-10-24 LAB
ANION GAP SERPL CALCULATED.3IONS-SCNC: 8.6 MMOL/L (ref 5–15)
BUN BLD-MCNC: 18 MG/DL (ref 6–20)
BUN/CREAT SERPL: 22.2 (ref 7–25)
CALCIUM SPEC-SCNC: 10.3 MG/DL (ref 8.6–10.5)
CHLORIDE SERPL-SCNC: 93 MMOL/L (ref 98–107)
CO2 SERPL-SCNC: 30.4 MMOL/L (ref 22–29)
CREAT BLD-MCNC: 0.81 MG/DL (ref 0.57–1)
FOLATE SERPL-MCNC: 18.7 NG/ML (ref 4.78–24.2)
GFR SERPL CREATININE-BSD FRML MDRD: 75 ML/MIN/1.73
GLUCOSE BLD-MCNC: 91 MG/DL (ref 65–99)
POTASSIUM BLD-SCNC: 3.5 MMOL/L (ref 3.5–5.2)
SODIUM BLD-SCNC: 132 MMOL/L (ref 136–145)
VIT B12 BLD-MCNC: 1117 PG/ML (ref 211–946)

## 2019-10-24 PROCEDURE — 99214 OFFICE O/P EST MOD 30 MIN: CPT | Performed by: NURSE PRACTITIONER

## 2019-10-24 PROCEDURE — 82607 VITAMIN B-12: CPT | Performed by: NURSE PRACTITIONER

## 2019-10-24 PROCEDURE — 36415 COLL VENOUS BLD VENIPUNCTURE: CPT | Performed by: NURSE PRACTITIONER

## 2019-10-24 PROCEDURE — 80171 DRUG SCREEN QUANT GABAPENTIN: CPT | Performed by: NURSE PRACTITIONER

## 2019-10-24 PROCEDURE — 82746 ASSAY OF FOLIC ACID SERUM: CPT | Performed by: NURSE PRACTITIONER

## 2019-10-24 PROCEDURE — 80307 DRUG TEST PRSMV CHEM ANLYZR: CPT | Performed by: NURSE PRACTITIONER

## 2019-10-24 PROCEDURE — 83921 ORGANIC ACID SINGLE QUANT: CPT | Performed by: NURSE PRACTITIONER

## 2019-10-24 PROCEDURE — 80048 BASIC METABOLIC PNL TOTAL CA: CPT | Performed by: NURSE PRACTITIONER

## 2019-10-24 RX ORDER — METOPROLOL SUCCINATE 100 MG/1
1 TABLET, EXTENDED RELEASE ORAL DAILY
Refills: 0 | COMMUNITY
Start: 2019-10-01 | End: 2021-05-24 | Stop reason: ALTCHOICE

## 2019-10-24 RX ORDER — BACLOFEN 10 MG/1
20 TABLET ORAL 3 TIMES DAILY
Qty: 120 TABLET | Refills: 11 | Status: SHIPPED | OUTPATIENT
Start: 2019-10-24 | End: 2020-07-21

## 2019-10-24 RX ORDER — NORTRIPTYLINE HYDROCHLORIDE 25 MG/1
25 CAPSULE ORAL NIGHTLY
Qty: 30 CAPSULE | Refills: 5 | Status: SHIPPED | OUTPATIENT
Start: 2019-10-24 | End: 2020-06-02

## 2019-10-24 RX ORDER — MELOXICAM 7.5 MG/1
7.5 TABLET ORAL DAILY
Qty: 30 TABLET | Refills: 11 | Status: SHIPPED | OUTPATIENT
Start: 2019-10-24 | End: 2020-08-05

## 2019-10-24 RX ORDER — IRBESARTAN AND HYDROCHLOROTHIAZIDE 150; 12.5 MG/1; MG/1
1 TABLET, FILM COATED ORAL DAILY
Refills: 0 | COMMUNITY
Start: 2019-10-14 | End: 2020-08-05 | Stop reason: DRUGHIGH
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
## 2019-10-27 LAB — GABAPENTIN SERPLBLD-MCNC: 4 UG/ML (ref 4–16)

## 2019-10-28 LAB
AMPHETAMINES SERPL QL SCN: NEGATIVE NG/ML
BARBITURATES SERPLBLD QL: NEGATIVE UG/ML
BENZODIAZ SERPL QL: NEGATIVE NG/ML
BZE BLD QL SCN: NEGATIVE NG/ML
ETHANOL BLD-MCNC: NEGATIVE GM/DL
Lab: NORMAL
METHADONE UR QL: NEGATIVE NG/ML
METHYLMALONATE SERPL-SCNC: 198 NMOL/L (ref 0–378)
OPIATES SERPL QL SCN: NEGATIVE NG/ML
OXYCODONE SERPL-MCNC: NEGATIVE NG/ML
PCP SPEC-MCNC: NEGATIVE NG/ML
PROPOXYPH SPEC QL: NEGATIVE NG/ML
THC SERPLBLD CFM-MCNC: NEGATIVE NG/ML

## 2019-10-28 NOTE — PROGRESS NOTES
Drug Screen appropriate, gabapentin level low end of normal.  Please refill gabapentin 30-day supply with 5 refills  Please let her know her B12 level was actually bit high, no indication of deficiency.  Folate level in normal range as well  Her sodium level was a little low, this needs to be rechecked through primary care, recommend in the next couple weeks.  Send a copy of labs to PCP and ensure they received these.

## 2019-11-01 ENCOUNTER — TELEPHONE (OUTPATIENT)
Dept: NEUROLOGY | Facility: CLINIC | Age: 49
End: 2019-11-01

## 2019-11-01 NOTE — TELEPHONE ENCOUNTER
Spoke with Kalina at [facility] office.   She will let us know if they don't receive labs and made a note in chart.

## 2019-11-06 ENCOUNTER — TELEPHONE (OUTPATIENT)
Dept: NEUROLOGY | Facility: CLINIC | Age: 49
End: 2019-11-06

## 2019-11-06 ENCOUNTER — HOSPITAL ENCOUNTER (OUTPATIENT)
Facility: HOSPITAL | Age: 49
Discharge: HOME | End: 2019-11-06
Payer: MEDICARE

## 2019-11-06 DIAGNOSIS — M54.9 CHRONIC NECK AND BACK PAIN: ICD-10-CM

## 2019-11-06 DIAGNOSIS — G89.29 CHRONIC NECK AND BACK PAIN: ICD-10-CM

## 2019-11-06 DIAGNOSIS — F51.04 CHRONIC INSOMNIA: ICD-10-CM

## 2019-11-06 DIAGNOSIS — G89.4 CHRONIC PAIN SYNDROME: ICD-10-CM

## 2019-11-06 DIAGNOSIS — M54.2 CHRONIC NECK AND BACK PAIN: ICD-10-CM

## 2019-11-06 DIAGNOSIS — Z51.81 MEDICATION MONITORING ENCOUNTER: ICD-10-CM

## 2019-11-06 LAB
A/G RATIO: 2 (ref 0.8–2)
ALBUMIN SERPL-MCNC: 4.9 G/DL (ref 3.4–4.8)
ALP BLD-CCNC: 89 U/L (ref 25–100)
ALT SERPL-CCNC: 15 U/L (ref 4–36)
ANION GAP SERPL CALCULATED.3IONS-SCNC: 13 MMOL/L (ref 3–16)
AST SERPL-CCNC: 20 U/L (ref 8–33)
BASOPHILS ABSOLUTE: 0 K/UL (ref 0–0.1)
BASOPHILS RELATIVE PERCENT: 0.5 %
BILIRUB SERPL-MCNC: 0.3 MG/DL (ref 0.3–1.2)
BUN BLDV-MCNC: 13 MG/DL (ref 6–20)
CALCIUM SERPL-MCNC: 9.8 MG/DL (ref 8.5–10.5)
CHLORIDE BLD-SCNC: 100 MMOL/L (ref 98–107)
CO2: 25 MMOL/L (ref 20–30)
CREAT SERPL-MCNC: 0.8 MG/DL (ref 0.4–1.2)
EOSINOPHILS ABSOLUTE: 0 K/UL (ref 0–0.4)
EOSINOPHILS RELATIVE PERCENT: 0.4 %
FOLATE: 15.04 NG/ML
GFR AFRICAN AMERICAN: >59
GFR NON-AFRICAN AMERICAN: >60
GLOBULIN: 2.5 G/DL
GLUCOSE BLD-MCNC: 89 MG/DL (ref 74–106)
HCT VFR BLD CALC: 40.7 % (ref 37–47)
HEMOGLOBIN: 13.4 G/DL (ref 11.5–16.5)
IMMATURE GRANULOCYTES #: 0 K/UL
IMMATURE GRANULOCYTES %: 0.2 % (ref 0–5)
LYMPHOCYTES ABSOLUTE: 2.1 K/UL (ref 1.5–4)
LYMPHOCYTES RELATIVE PERCENT: 24.4 %
MCH RBC QN AUTO: 29.6 PG (ref 27–32)
MCHC RBC AUTO-ENTMCNC: 32.9 G/DL (ref 31–35)
MCV RBC AUTO: 90 FL (ref 80–100)
MONOCYTES ABSOLUTE: 0.6 K/UL (ref 0.2–0.8)
MONOCYTES RELATIVE PERCENT: 7.5 %
NEUTROPHILS ABSOLUTE: 5.7 K/UL (ref 2–7.5)
NEUTROPHILS RELATIVE PERCENT: 67 %
PDW BLD-RTO: 12.6 % (ref 11–16)
PLATELET # BLD: 269 K/UL (ref 150–400)
PMV BLD AUTO: 12 FL (ref 6–10)
POTASSIUM SERPL-SCNC: 4.1 MMOL/L (ref 3.4–5.1)
RBC # BLD: 4.52 M/UL (ref 3.8–5.8)
SODIUM BLD-SCNC: 138 MMOL/L (ref 136–145)
TOTAL PROTEIN: 7.4 G/DL (ref 6.4–8.3)
VITAMIN B-12: 1200 PG/ML (ref 211–911)
VITAMIN D 25-HYDROXY: 30.4 (ref 32–100)
WBC # BLD: 8.5 K/UL (ref 4–11)

## 2019-11-06 PROCEDURE — 82607 VITAMIN B-12: CPT

## 2019-11-06 PROCEDURE — 82746 ASSAY OF FOLIC ACID SERUM: CPT

## 2019-11-06 PROCEDURE — 82306 VITAMIN D 25 HYDROXY: CPT

## 2019-11-06 PROCEDURE — 36415 COLL VENOUS BLD VENIPUNCTURE: CPT

## 2019-11-06 PROCEDURE — 85025 COMPLETE CBC W/AUTO DIFF WBC: CPT

## 2019-11-06 PROCEDURE — 80053 COMPREHEN METABOLIC PANEL: CPT

## 2019-11-06 NOTE — TELEPHONE ENCOUNTER
Patient called in and stated that she still hasn't received any of her medication and when I went in to check the prescriptions was sent to [facility] and her pharmacy is actually [facility]. She is going to called [facility] and see if she can have them send it over

## 2019-11-07 RX ORDER — MELOXICAM 7.5 MG/1
7.5 TABLET ORAL DAILY
Qty: 30 TABLET | Refills: 11 | OUTPATIENT
Start: 2019-11-07

## 2019-11-07 RX ORDER — BACLOFEN 10 MG/1
TABLET ORAL
Qty: 120 TABLET | Refills: 11 | OUTPATIENT
Start: 2019-11-07

## 2019-11-07 RX ORDER — NORTRIPTYLINE HYDROCHLORIDE 25 MG/1
25 CAPSULE ORAL NIGHTLY
Qty: 180 CAPSULE | Refills: 0 | OUTPATIENT
Start: 2019-11-07

## 2019-11-07 RX ORDER — CHOLECALCIFEROL (VITAMIN D3) 25 MCG
TABLET,CHEWABLE ORAL
Qty: 60 CAPSULE | Refills: 0 | OUTPATIENT
Start: 2019-11-07

## 2019-11-07 RX ORDER — GABAPENTIN 600 MG/1
TABLET ORAL
Qty: 120 TABLET | Refills: 5 | OUTPATIENT
Start: 2019-11-07

## 2019-11-25 ENCOUNTER — TELEPHONE (OUTPATIENT)
Dept: SURGERY | Facility: CLINIC | Age: 49
End: 2019-11-25

## 2019-11-26 RX ORDER — BISACODYL 5 MG/1
TABLET, DELAYED RELEASE ORAL
Qty: 4 TABLET | Refills: 0 | Status: SHIPPED | OUTPATIENT
Start: 2019-11-26 | End: 2020-03-23

## 2019-11-26 RX ORDER — POLYETHYLENE GLYCOL 3350 17 G/17G
POWDER, FOR SOLUTION ORAL
Qty: 238 G | Refills: 0 | Status: SHIPPED | OUTPATIENT
Start: 2019-11-26 | End: 2020-03-23

## 2019-11-26 NOTE — TELEPHONE ENCOUNTER
PRESCREENING FOR OPEN ACCESS SCHEDULING    Nely Castrejon, 1970  [id number]    11/26/19    If, the patient answers yes to any of the following questions the provider will be informed prior to scheduling open access for approval and documented in the chart.    [x]  Yes  [] No    1. Have you ever had a colonoscopy in the past?      When:        Where:       Polyps or other:     []  Yes  [x] No    2. Family history of colon cancer?      Relation:       Age of onset:       Do you currently have any of the following?    []  Yes  [x] No  Rectal bleeding, if so, how long?     []  Yes  [x] No  Abdominal pain, if so, how long?    []  Yes  [x] No  Constipation, if so, how long?    []  Yes  [x] No  Diarrhea, if so, how long?    []  Yes  [x] No  Weight loss, is so, how much?    [] Yes  [x] No  Small caliber stool, if so, how long?      Have you ever had any of the following conditions?    [] Yes  [x] No  Heart attack?      When?       Last cardiac workup?     Blood thinners?    [] Yes  [x] No   Lung problems, asthma or COPD?  [] Yes  [x] No  Oxygen required?       [] Yes  [x] No  Stroke?     [] Yes  [x] No  Have you ever had a reaction to anesthesia?      [facility] COLON 01/09/20

## 2019-11-27 ENCOUNTER — PREP FOR SURGERY (OUTPATIENT)
Dept: OTHER | Facility: HOSPITAL | Age: 49
End: 2019-11-27

## 2019-11-27 DIAGNOSIS — Z12.11 COLON CANCER SCREENING: Primary | ICD-10-CM

## 2019-12-09 ENCOUNTER — HOSPITAL ENCOUNTER (OUTPATIENT)
Dept: GENERAL RADIOLOGY | Facility: HOSPITAL | Age: 49
Discharge: HOME | End: 2019-12-09
Payer: MEDICARE

## 2019-12-09 ENCOUNTER — HOSPITAL ENCOUNTER (OUTPATIENT)
Facility: HOSPITAL | Age: 49
Discharge: HOME | End: 2019-12-09
Payer: MEDICARE

## 2019-12-09 DIAGNOSIS — M54.50 LOW BACK PAIN, UNSPECIFIED BACK PAIN LATERALITY, UNSPECIFIED CHRONICITY, UNSPECIFIED WHETHER SCIATICA PRESENT: ICD-10-CM

## 2019-12-09 PROCEDURE — 72220 X-RAY EXAM SACRUM TAILBONE: CPT

## 2019-12-09 PROCEDURE — 72100 X-RAY EXAM L-S SPINE 2/3 VWS: CPT

## 2019-12-11 ENCOUNTER — HOSPITAL ENCOUNTER (OUTPATIENT)
Dept: MAMMOGRAPHY | Facility: HOSPITAL | Age: 49
Discharge: HOME | End: 2019-12-11
Payer: MEDICARE

## 2019-12-11 DIAGNOSIS — Z12.31 BREAST CANCER SCREENING BY MAMMOGRAM: ICD-10-CM

## 2019-12-11 PROCEDURE — 77063 BREAST TOMOSYNTHESIS BI: CPT

## 2019-12-20 ENCOUNTER — HOSPITAL ENCOUNTER (OUTPATIENT)
Dept: MAMMOGRAPHY | Facility: HOSPITAL | Age: 49
Discharge: HOME | End: 2019-12-20
Payer: MEDICARE

## 2019-12-20 ENCOUNTER — HOSPITAL ENCOUNTER (OUTPATIENT)
Dept: ULTRASOUND IMAGING | Facility: HOSPITAL | Age: 49
Discharge: HOME | End: 2019-12-20
Payer: MEDICARE

## 2019-12-20 DIAGNOSIS — R92.8 ABNORMAL MAMMOGRAM: ICD-10-CM

## 2019-12-20 DIAGNOSIS — N63.10 BREAST MASS, RIGHT: ICD-10-CM

## 2019-12-20 PROCEDURE — 76642 ULTRASOUND BREAST LIMITED: CPT

## 2019-12-20 PROCEDURE — G0279 TOMOSYNTHESIS, MAMMO: HCPCS

## 2019-12-30 RX ORDER — CHOLECALCIFEROL (VITAMIN D3) 25 MCG
TABLET,CHEWABLE ORAL
Qty: 60 CAPSULE | Refills: 6 | Status: SHIPPED | OUTPATIENT
Start: 2019-12-30

## 2020-01-09 ENCOUNTER — HOSPITAL ENCOUNTER (OUTPATIENT)
Facility: HOSPITAL | Age: 50
Setting detail: OUTPATIENT SURGERY
Discharge: HOME | End: 2020-01-09
Attending: SURGERY | Admitting: SURGERY
Payer: MEDICARE

## 2020-01-09 ENCOUNTER — ANESTHESIA (OUTPATIENT)
Dept: ENDOSCOPY | Facility: HOSPITAL | Age: 50
End: 2020-01-09
Payer: MEDICARE

## 2020-01-09 ENCOUNTER — OUTSIDE FACILITY SERVICE (OUTPATIENT)
Dept: SURGERY | Facility: CLINIC | Age: 50
End: 2020-01-09

## 2020-01-09 ENCOUNTER — ANESTHESIA EVENT (OUTPATIENT)
Dept: ENDOSCOPY | Facility: HOSPITAL | Age: 50
End: 2020-01-09
Payer: MEDICARE

## 2020-01-09 VITALS
OXYGEN SATURATION: 96 % | RESPIRATION RATE: 19 BRPM | SYSTOLIC BLOOD PRESSURE: 92 MMHG | DIASTOLIC BLOOD PRESSURE: 53 MMHG

## 2020-01-09 VITALS
DIASTOLIC BLOOD PRESSURE: 66 MMHG | SYSTOLIC BLOOD PRESSURE: 108 MMHG | HEIGHT: 63 IN | HEART RATE: 76 BPM | TEMPERATURE: 97.9 F | RESPIRATION RATE: 18 BRPM | WEIGHT: 135 LBS | OXYGEN SATURATION: 99 % | BODY MASS INDEX: 23.92 KG/M2

## 2020-01-09 PROCEDURE — G0121 COLON CA SCRN NOT HI RSK IND: HCPCS | Performed by: SURGERY

## 2020-01-09 PROCEDURE — 3700000001 HC ADD 15 MINUTES (ANESTHESIA): Performed by: SURGERY

## 2020-01-09 PROCEDURE — 7100000010 HC PHASE II RECOVERY - FIRST 15 MIN: Performed by: SURGERY

## 2020-01-09 PROCEDURE — 3700000000 HC ANESTHESIA ATTENDED CARE: Performed by: SURGERY

## 2020-01-09 PROCEDURE — 2500000003 HC RX 250 WO HCPCS: Performed by: NURSE ANESTHETIST, CERTIFIED REGISTERED

## 2020-01-09 PROCEDURE — 2580000003 HC RX 258: Performed by: SURGERY

## 2020-01-09 PROCEDURE — 6360000002 HC RX W HCPCS: Performed by: NURSE ANESTHETIST, CERTIFIED REGISTERED

## 2020-01-09 PROCEDURE — 3609027000 HC COLONOSCOPY: Performed by: SURGERY

## 2020-01-09 RX ORDER — PROPOFOL 10 MG/ML
INJECTION, EMULSION INTRAVENOUS PRN
Status: DISCONTINUED | OUTPATIENT
Start: 2020-01-09 | End: 2020-01-09 | Stop reason: SDUPTHER

## 2020-01-09 RX ORDER — SODIUM CHLORIDE, SODIUM LACTATE, POTASSIUM CHLORIDE, CALCIUM CHLORIDE 600; 310; 30; 20 MG/100ML; MG/100ML; MG/100ML; MG/100ML
INJECTION, SOLUTION INTRAVENOUS CONTINUOUS
Status: DISCONTINUED | OUTPATIENT
Start: 2020-01-09 | End: 2020-01-09 | Stop reason: HOSPADM

## 2020-01-09 RX ADMIN — PROPOFOL 50 MG: 10 INJECTION, EMULSION INTRAVENOUS at 10:08

## 2020-01-09 RX ADMIN — SODIUM CHLORIDE, POTASSIUM CHLORIDE, SODIUM LACTATE AND CALCIUM CHLORIDE: 600; 310; 30; 20 INJECTION, SOLUTION INTRAVENOUS at 09:12

## 2020-01-09 RX ADMIN — PROPOFOL 30 MG: 10 INJECTION, EMULSION INTRAVENOUS at 10:14

## 2020-01-09 RX ADMIN — PROPOFOL 50 MG: 10 INJECTION, EMULSION INTRAVENOUS at 10:05

## 2020-01-09 RX ADMIN — LIDOCAINE HYDROCHLORIDE 20 MG: 10 INJECTION, SOLUTION INFILTRATION; PERINEURAL at 10:05

## 2020-01-09 RX ADMIN — PROPOFOL 40 MG: 10 INJECTION, EMULSION INTRAVENOUS at 10:18

## 2020-01-09 RX ADMIN — PROPOFOL 30 MG: 10 INJECTION, EMULSION INTRAVENOUS at 10:10

## 2020-01-09 NOTE — PROGRESS NOTES
Pt awake. No complaints of pain or nausea. Abd soft. +BS. +Flatus. Side rails up x2. Tolerates CL well. Verbalizes understanding of d/c instructions.

## 2020-01-09 NOTE — PROGRESS NOTES
Pt arrives ambulatory. No complaints noted. Verifies she is here for colonoscopy with Dr. Sutton. States prep was effective. Verbalizes understanding of procedure. Consent on chart. States daughter will drive her home post-procedure. Per pt, ok for Dr. Sutton to speak with daughter regarding results. HR regular. Lungs clear. +BS. Denies chest pain or SOA. Pt does have a pacemaker. Side rails up x 2.

## 2020-01-09 NOTE — OP NOTE
PATIENT:    John Sandoval    DATE OF SURGERY:  01/09/20    PHYSICIAN:    Jose Roberto Ybarra MD, FACS    REFERRING PHYSICIAN:  KUNAL Lira CNP      YOB: 1970    PREOPERATIVE DIAGNOSIS:   Colon cancer screening    POSTOPERATIVE DIAGNOSIS: Tortuous but normal colonoscopy      Estimated blood loss: None    PROCEDURE:  Colonoscopy     HISTORY:   The patient was sent to me as a consultation via KUNAL Lira CNP for evaluation and treatment of the above-mentioned symptomatology. The patient is here now today for elective colonoscopy. I did meet with the patient preoperatively who understands the full risks and benefits of the above-mentioned procedure. We will proceed with this today on an elective basis. ANESTHESIA:  The patient was monitored both preoperatively and postoperatively in the normal fashion from a cardiovascular standpoint. Oxygen was delivered at 2 liters per nasal cannula, and oxygen saturations were monitored during the procedure. The patient remained stable from a cardiovascular standpoint throughout the entire procedure. OPERATIVE PROCEDURE:  The patient was taken to the endoscopy unit and placed in the supine position and given anesthesia as mentioned above. The patient was then placed in the left lateral decubitus position and the scope was introduced into the patients anus and then into the rectum without difficulty. The scope was carefully advanced throughout the colon to the ileocecal valve. All mucosal surfaces were visualized. Upon careful withdrawal of the scope, there was noted to be a tortuous colon. No mass lesions or polyps. Cecum well visualized. .      A retroflexed view was obtained of the patients rectum and this showed no hemorrhoids. Digital rectal examination was performed and revealed good sphincter tone and no obvious masses.      The patient was stable at this point in time and subsequently transferred back to the recovery room in stable condition.     QUALITY OF PREP: Adequate    PLAN: Repeat colonoscopy in 10 years    Electronically signed by Cynthia Nagy MD, FACS on 1/9/2020 at 10:23 AM

## 2020-01-09 NOTE — ANESTHESIA PRE PROCEDURE
Department of Anesthesiology  Preprocedure Note       Name:  João Krause   Age:  47 y.o.  :  1970                                          MRN:  [id number]         Date:  2020      Surgeon: Geovani Sweeney):  Juana Brooks MD    Procedure: COLONOSCOPY DIAGNOSTIC (N/A )    Medications prior to admission:   Prior to Admission medications    Not on File       Current medications:    Current Facility-Administered Medications   Medication Dose Route Frequency Provider Last Rate Last Dose    lactated ringers infusion   Intravenous Continuous Juana Brooks MD 80 mL/hr at 20 5648         Allergies:  No Known Allergies    Problem List:  There is no problem list on file for this patient. Past Medical History:  No past medical history on file. Past Surgical History:  No past surgical history on file. Social History:    Social History     Tobacco Use    Smoking status: Not on file   Substance Use Topics    Alcohol use: Not on file                                Counseling given: Not Answered      Vital Signs (Current):   Vitals:    20 0908 20 0910   BP:  92/62   Pulse:  102   Resp:  18   Temp:  97.7 °F (36.5 °C)   TempSrc:  Oral   SpO2:  99%   Weight: 135 lb (61.2 kg)    Height: 5' 3\" (1.6 m)                                               BP Readings from Last 3 Encounters:   20 92/62       NPO Status: Time of last liquid consumption: 0630(sips with BP med)                        Time of last solid consumption:                         Date of last liquid consumption: 20                        Date of last solid food consumption: 20    BMI:   Wt Readings from Last 3 Encounters:   20 135 lb (61.2 kg)     Body mass index is 23.91 kg/m².     CBC:   Lab Results   Component Value Date    WBC 8.5 2019    RBC 4.52 2019    HGB 13.4 2019    HCT 40.7 2019    MCV 90.0 2019    RDW 12.6 2019     2019       CMP:   Lab

## 2020-01-23 ENCOUNTER — HOSPITAL ENCOUNTER (OUTPATIENT)
Dept: GENERAL RADIOLOGY | Facility: HOSPITAL | Age: 50
Discharge: HOME | End: 2020-01-23
Payer: MEDICARE

## 2020-01-23 ENCOUNTER — HOSPITAL ENCOUNTER (OUTPATIENT)
Facility: HOSPITAL | Age: 50
Discharge: HOME | End: 2020-01-23
Payer: MEDICARE

## 2020-01-23 PROCEDURE — 71101 X-RAY EXAM UNILAT RIBS/CHEST: CPT

## 2020-01-23 PROCEDURE — 71046 X-RAY EXAM CHEST 2 VIEWS: CPT

## 2020-03-23 ENCOUNTER — OFFICE VISIT (OUTPATIENT)
Dept: INTERNAL MEDICINE | Facility: CLINIC | Age: 50
End: 2020-03-23

## 2020-03-23 VITALS
BODY MASS INDEX: 27.11 KG/M2 | WEIGHT: 153 LBS | DIASTOLIC BLOOD PRESSURE: 70 MMHG | HEIGHT: 63 IN | SYSTOLIC BLOOD PRESSURE: 105 MMHG | TEMPERATURE: 96.9 F | HEART RATE: 76 BPM | OXYGEN SATURATION: 96 % | RESPIRATION RATE: 18 BRPM

## 2020-03-23 DIAGNOSIS — I10 ESSENTIAL HYPERTENSION: Primary | ICD-10-CM

## 2020-03-23 DIAGNOSIS — K21.9 GASTROESOPHAGEAL REFLUX DISEASE, ESOPHAGITIS PRESENCE NOT SPECIFIED: ICD-10-CM

## 2020-03-23 DIAGNOSIS — B37.9 ANTIBIOTIC-INDUCED YEAST INFECTION: ICD-10-CM

## 2020-03-23 DIAGNOSIS — T36.95XA ANTIBIOTIC-INDUCED YEAST INFECTION: ICD-10-CM

## 2020-03-23 DIAGNOSIS — K04.7 TOOTH INFECTION: ICD-10-CM

## 2020-03-23 PROCEDURE — 99203 OFFICE O/P NEW LOW 30 MIN: CPT | Performed by: FAMILY MEDICINE

## 2020-03-23 RX ORDER — LANSOPRAZOLE 30 MG/1
30 CAPSULE, DELAYED RELEASE ORAL DAILY
COMMUNITY
Start: 2020-03-06 | End: 2020-03-23

## 2020-03-23 RX ORDER — RABEPRAZOLE SODIUM 20 MG/1
20 TABLET, DELAYED RELEASE ORAL DAILY
Qty: 90 TABLET | Refills: 3 | Status: SHIPPED | OUTPATIENT
Start: 2020-03-23 | End: 2020-10-23 | Stop reason: SDUPTHER

## 2020-03-23 RX ORDER — CLINDAMYCIN HYDROCHLORIDE 300 MG/1
300 CAPSULE ORAL 3 TIMES DAILY
Qty: 30 CAPSULE | Refills: 0 | Status: SHIPPED | OUTPATIENT
Start: 2020-03-23 | End: 2020-04-24 | Stop reason: SDUPTHER

## 2020-03-23 RX ORDER — DULOXETIN HYDROCHLORIDE 30 MG/1
CAPSULE, DELAYED RELEASE ORAL
COMMUNITY
Start: 2020-02-26 | End: 2020-09-11 | Stop reason: DRUGHIGH

## 2020-03-23 RX ORDER — CETIRIZINE HYDROCHLORIDE 10 MG/1
10 TABLET ORAL DAILY
COMMUNITY
End: 2021-03-31

## 2020-03-23 RX ORDER — FLUCONAZOLE 150 MG/1
TABLET ORAL
Qty: 2 TABLET | Refills: 0 | Status: SHIPPED | OUTPATIENT
Start: 2020-03-23 | End: 2020-07-21

## 2020-03-23 NOTE — PROGRESS NOTES
Subjective    Nely Castrejon is a 50 y.o. female here for:  Chief Complaint   Patient presents with   • Hypertension     Controlled with meds, not worrid about today.    • Heartburn     Prevacid seems to be better than what she took before.        History per MA reviewed.    Followed by rheumatology for fibromyalgia though was suspected of having rheumatoid arthritis previously.  She has battled polyarthralgia since 1994.    Followed by neurology for hemicrania, migraines.    Followed by Dr. Frost, cardiology, has pacemaker in place.  He has been adjusting blood pressure medicines and has refilled these.  She notes it took some time to get the combination right, she was having some issues with hypotension.  Feels things have been lined out with him now.    GERD is a chronic issue.  Has been taking Prevacid high-dose and it has helped somewhat though still having some breakthrough indigestion and heartburn.  Has had endoscopy but it has been many years.    Was scheduled for tooth extraction but dentist has closed due to epidemic.  Has 2 teeth at this time that feel infected.  Recently finished a round of Augmentin, continues to have symptoms.    Hypertension   This is a chronic problem. The current episode started more than 1 year ago. The problem has been waxing and waning since onset. The problem is controlled. Associated symptoms include shortness of breath (with exertion). Agents associated with hypertension include NSAIDs. Risk factors for coronary artery disease include stress. Past treatments include beta blockers. Current antihypertension treatment includes beta blockers, angiotensin blockers and diuretics. The current treatment provides significant improvement. There are no compliance problems.    Heartburn   This is a chronic problem. The current episode started more than 1 year ago. The problem has been waxing and waning. The symptoms are aggravated by certain foods. Associated symptoms include fatigue.  "Risk factors include NSAIDs. She has tried a PPI for the symptoms.   Dental Pain    This is a recurrent problem. The current episode started 1 to 4 weeks ago. The problem occurs daily. The problem has been waxing and waning. Associated symptoms include facial pain. Pertinent negatives include no fever. She has tried NSAIDs for the symptoms. The treatment provided no relief.          The following portions of the patient's history were reviewed and updated as appropriate: allergies, current medications, past family history, past medical history, past social history, past surgical history and problem list.    Review of Systems   Constitutional: Positive for fatigue and unexpected weight gain. Negative for fever.   Eyes: Positive for pain and visual disturbance.   Respiratory: Positive for shortness of breath (with exertion).    Gastrointestinal: Positive for indigestion.   Endocrine: Positive for polydipsia.   Musculoskeletal: Positive for gait problem.        Painful joints, muscle pain, swollen joints, limb pain, limb swelling, chronic pain.   Neurological: Positive for headache.   Psychiatric/Behavioral: Positive for sleep disturbance. The patient is nervous/anxious.         Change in personality   All other systems reviewed and are negative.      Visit Vitals  /70   Pulse 76   Temp 96.9 °F (36.1 °C) (Temporal)   Resp 18   Ht 160 cm (62.99\")   Wt 69.4 kg (153 lb)   SpO2 96%   BMI 27.11 kg/m²         Objective   Physical Exam   Constitutional: She is oriented to person, place, and time. Vital signs are normal. She appears well-developed and well-nourished. She is active.  Non-toxic appearance. She does not appear ill. No distress.   HENT:   Head: Normocephalic and atraumatic.   Right Ear: Hearing normal.   Left Ear: Hearing normal.   Mouth/Throat: Mucous membranes are not dry.   Eyes: EOM are normal. No scleral icterus.   Neck: Phonation normal. Neck supple.   Pulmonary/Chest: Effort normal.   Neurological: " She is alert and oriented to person, place, and time. She displays no tremor. No cranial nerve deficit.   Skin: Skin is warm. No rash noted. She is not diaphoretic. No pallor.   Psychiatric: She has a normal mood and affect. Her speech is normal and behavior is normal. Judgment and thought content normal. Cognition and memory are normal.   Nursing note and vitals reviewed.        Assessment/Plan     Problem List Items Addressed This Visit        Cardiovascular and Mediastinum    Essential hypertension - Primary    Current Assessment & Plan     Hypertension is improving with treatment.  Continue current treatment regimen.  Blood pressure will be reassessed at the next regular appointment.            Digestive    GERD (gastroesophageal reflux disease)    Relevant Medications    RABEprazole (ACIPHEX) 20 MG EC tablet      Other Visit Diagnoses     Tooth infection        Relevant Medications    clindamycin (CLEOCIN) 300 MG capsule    Antibiotic-induced yeast infection        Relevant Medications    fluconazole (Diflucan) 150 MG tablet          · Continue current medication for blood pressure and follow-up with cardiology  · Follow-up with neurology for chronic issues  · Discussed need to have teeth extracted that are problematic, this is not feasible at this time given epidemic  · Trial of different PPI, at some point may need repeat EGD.    Jie Mayo MD

## 2020-04-24 ENCOUNTER — TELEPHONE (OUTPATIENT)
Dept: INTERNAL MEDICINE | Facility: CLINIC | Age: 50
End: 2020-04-24

## 2020-04-24 DIAGNOSIS — K04.7 TOOTH INFECTION: ICD-10-CM

## 2020-04-24 RX ORDER — CLINDAMYCIN HYDROCHLORIDE 300 MG/1
300 CAPSULE ORAL 3 TIMES DAILY
Qty: 30 CAPSULE | Refills: 0 | Status: SHIPPED | OUTPATIENT
Start: 2020-04-24 | End: 2020-05-04

## 2020-04-24 NOTE — TELEPHONE ENCOUNTER
I saw her a month ago for same issue. Refilled that antibiotic. Now that some procedures are being opened back up she needs to call dentist on Monday to get in for that tooth. Antibiotic is only temporary fix.

## 2020-04-24 NOTE — TELEPHONE ENCOUNTER
Patient states that she has an abcess tooth and would like to see if she can get some antibiotics.  She can be reached at [phone]    Sukhwinder Christensen

## 2020-05-06 DIAGNOSIS — F51.04 CHRONIC INSOMNIA: ICD-10-CM

## 2020-05-06 DIAGNOSIS — M54.2 CHRONIC NECK AND BACK PAIN: ICD-10-CM

## 2020-05-06 DIAGNOSIS — M54.9 CHRONIC NECK AND BACK PAIN: ICD-10-CM

## 2020-05-06 DIAGNOSIS — G89.4 CHRONIC PAIN SYNDROME: ICD-10-CM

## 2020-05-06 DIAGNOSIS — G89.29 CHRONIC NECK AND BACK PAIN: ICD-10-CM

## 2020-05-06 DIAGNOSIS — Z51.81 MEDICATION MONITORING ENCOUNTER: ICD-10-CM

## 2020-05-06 RX ORDER — GABAPENTIN 600 MG/1
600 TABLET ORAL 4 TIMES DAILY
Qty: 120 TABLET | Refills: 5
Start: 2020-05-06

## 2020-05-06 RX ORDER — BACLOFEN 10 MG/1
20 TABLET ORAL 3 TIMES DAILY
Qty: 120 TABLET | Refills: 11 | OUTPATIENT
Start: 2020-05-06

## 2020-05-06 RX ORDER — MELOXICAM 7.5 MG/1
7.5 TABLET ORAL DAILY
Qty: 30 TABLET | Refills: 11 | OUTPATIENT
Start: 2020-05-06

## 2020-05-06 RX ORDER — CHOLECALCIFEROL (VITAMIN D3) 25 MCG
1 TABLET,CHEWABLE ORAL DAILY
Qty: 60 CAPSULE | Refills: 6 | OUTPATIENT
Start: 2020-05-06

## 2020-05-06 RX ORDER — NORTRIPTYLINE HYDROCHLORIDE 25 MG/1
25 CAPSULE ORAL NIGHTLY
Qty: 30 CAPSULE | Refills: 5 | OUTPATIENT
Start: 2020-05-06

## 2020-05-06 NOTE — TELEPHONE ENCOUNTER
I called pharmacy and clarified pt is only out of the gabapentin and,  I denied all refill requests, and the script they were filling from is from 11-19 and has no RF. I have sent the request for the Gabapentin to Dr. Cortez to review and will proceed with his instructions I receive them. Adin Harris

## 2020-05-12 ENCOUNTER — TELEPHONE (OUTPATIENT)
Dept: NEUROLOGY | Facility: CLINIC | Age: 50
End: 2020-05-12

## 2020-05-18 ENCOUNTER — TELEPHONE (OUTPATIENT)
Dept: NEUROLOGY | Facility: CLINIC | Age: 50
End: 2020-05-18

## 2020-05-18 NOTE — TELEPHONE ENCOUNTER
PT CALLED AGAIN TO INQUIRE ABOUT GABAPENTIN 600 MG RX, STATES SHE HAS BEEN OUT SINCE 5-5-20.    PT LAST SEEN 10-24-19 BY BIBIANA ANG, HAS F/U [facility] FOR 8-6-20    PHARMACY:  [address]  [phone]    PT ALSO ASKED ABOUT RESULTS OF FUNCTIONAL CAPACITY TESTS COMPLETED AT  IN MARCH.    PLEASE CALL PT RE STATUS OF REFILL AND TEST RESULTS: [phone]; LEAVE VM IF NO ANSWER.

## 2020-05-19 NOTE — TELEPHONE ENCOUNTER
Ok to authorize a 30 day supply of her gabapentin but she will need follow up before next refill due, she has to be seen every 6 months for gabapentin refills  I am no sure of any functional capacity results

## 2020-05-20 NOTE — TELEPHONE ENCOUNTER
I called pharmacy and gave a verbal for the gabapentin 600 mg to be taken 4x's daily 120 # and NRF.  Pt is scheduled for a f/u and knows she has to be seen for future refills.  I also ask her about the Functional Capacity Test and she said it was done at .  I will request the results from [facility]

## 2020-05-29 ENCOUNTER — TELEPHONE (OUTPATIENT)
Dept: NEUROLOGY | Facility: CLINIC | Age: 50
End: 2020-05-29

## 2020-06-02 ENCOUNTER — OFFICE VISIT (OUTPATIENT)
Dept: NEUROLOGY | Facility: CLINIC | Age: 50
End: 2020-06-02

## 2020-06-02 ENCOUNTER — LAB (OUTPATIENT)
Dept: LAB | Facility: HOSPITAL | Age: 50
End: 2020-06-02

## 2020-06-02 VITALS
HEIGHT: 63 IN | BODY MASS INDEX: 27.11 KG/M2 | OXYGEN SATURATION: 98 % | TEMPERATURE: 99.5 F | SYSTOLIC BLOOD PRESSURE: 100 MMHG | DIASTOLIC BLOOD PRESSURE: 60 MMHG | WEIGHT: 153 LBS | HEART RATE: 75 BPM

## 2020-06-02 DIAGNOSIS — Z51.81 MEDICATION MONITORING ENCOUNTER: ICD-10-CM

## 2020-06-02 DIAGNOSIS — F51.04 CHRONIC INSOMNIA: ICD-10-CM

## 2020-06-02 DIAGNOSIS — R41.0 DISORIENTATION: Primary | ICD-10-CM

## 2020-06-02 DIAGNOSIS — G89.4 PAIN SYNDROME, CHRONIC: ICD-10-CM

## 2020-06-02 DIAGNOSIS — M79.18 MYOFASCIAL PAIN SYNDROME: ICD-10-CM

## 2020-06-02 PROCEDURE — 99213 OFFICE O/P EST LOW 20 MIN: CPT | Performed by: NURSE PRACTITIONER

## 2020-06-02 PROCEDURE — 80307 DRUG TEST PRSMV CHEM ANLYZR: CPT

## 2020-06-02 PROCEDURE — 80171 DRUG SCREEN QUANT GABAPENTIN: CPT

## 2020-06-02 PROCEDURE — 36415 COLL VENOUS BLD VENIPUNCTURE: CPT

## 2020-06-02 RX ORDER — NORTRIPTYLINE HYDROCHLORIDE 25 MG/1
25 CAPSULE ORAL NIGHTLY
Qty: 30 CAPSULE | Refills: 5 | Status: SHIPPED | OUTPATIENT
Start: 2020-06-02 | End: 2020-08-06 | Stop reason: SDUPTHER

## 2020-06-02 NOTE — PROGRESS NOTES
Subjective:     Patient ID: Nely Castrejon is a 50 y.o. female.    CC:   Chief Complaint   Patient presents with   • Altered Mental Status       HPI:   History of Present Illness     Ms. Castrejon is here today for follow-up.  She is a long-term patient Dr. Cortez followed for insomnia, chronic nerve pain.  She is also been having spells of disorientation for quite some time.  She has trouble with her memory.  Last year Dr. Cortez did order neurocognitive testing at , results have been reviewed stating that her symptoms are persistent with disassociative disorder and they recommended close psychiatric follow-up.  She tells me today that she is seeing [facility] in [facility] in the past, she will make an appointment.  Since she was seen last she stopped nortriptyline as she was wondering if it was making her disoriented, she reports that she had no improvement in those symptoms but she has had more trouble sleeping and would like me to refill this today.  She is had no loss of consciousness or seizure-like activity  At last visit I did order a CT of her brain as well as EEG but she did not keep appointment    The following portions of the patient's history were reviewed and updated as appropriate: allergies, current medications, past family history, past medical history, past social history, past surgical history and problem list.    Past Medical History:   Diagnosis Date   • Arthritis    • Fibromyalgia, primary    • GERD (gastroesophageal reflux disease)    • History of osteoarthritis 8/1/2016   • Hypertension    • Primary stabbing headache    • Sick sinus syndrome (CMS/HCC)    • Vitamin B12 deficiency        Past Surgical History:   Procedure Laterality Date   • CHOLECYSTECTOMY     • PACEMAKER IMPLANTATION     • SHOULDER SURGERY      left   • SUBTOTAL HYSTERECTOMY      Partial        Social History     Socioeconomic History   • Marital status:      Spouse name: Not on file   • Number of children: Not  "on file   • Years of education: Not on file   • Highest education level: Not on file   Tobacco Use   • Smoking status: Never Smoker   • Smokeless tobacco: Never Used   Substance and Sexual Activity   • Alcohol use: Never     Frequency: Never   • Drug use: Never   • Sexual activity: Defer       Family History   Problem Relation Age of Onset   • Arthritis Maternal Grandmother    • Hypertension Maternal Grandmother    • Kidney disease Maternal Grandmother         Review of Systems   Constitutional: Negative.    HENT: Negative.    Eyes: Negative.    Respiratory: Negative.    Cardiovascular: Negative.    Gastrointestinal: Negative.    Endocrine: Negative.    Genitourinary: Negative.    Musculoskeletal: Positive for back pain and myalgias.   Skin: Negative.    Allergic/Immunologic: Negative.    Neurological: Negative.    Hematological: Negative.    Psychiatric/Behavioral: Positive for confusion, dysphoric mood and sleep disturbance. The patient is nervous/anxious.         Objective:  /60   Pulse 75   Temp 99.5 °F (37.5 °C)   Ht 160 cm (63\")   Wt 69.4 kg (153 lb)   SpO2 98%   BMI 27.10 kg/m²     Neurologic Exam     Mental Status   Oriented to person, place, and time.   Concentration: normal.   Speech: speech is normal   Level of consciousness: alert    Cranial Nerves   Cranial nerves II through XII intact.     CN III, IV, VI   Pupils are equal, round, and reactive to light.  Extraocular motions are normal.     Motor Exam   Muscle bulk: normal  Right arm pronator drift: absent  Left arm pronator drift: absent    Strength   Strength 5/5 throughout.     Gait, Coordination, and Reflexes     Gait  Gait: normal    Coordination   Romberg: negative  Finger to nose coordination: normal    Tremor   Resting tremor: absent  Intention tremor: absent  Action tremor: absent    Reflexes   Reflexes 2+ except as noted.   Right Gimenez: absent  Left Gimenez: absent      Physical Exam   Constitutional: She is oriented to person, " place, and time. She appears well-developed and well-nourished. No distress.   HENT:   Head: Normocephalic and atraumatic.   Eyes: Pupils are equal, round, and reactive to light. Conjunctivae and EOM are normal. No scleral icterus.   Neck: Normal range of motion. Neck supple.   Pulmonary/Chest: Effort normal. No respiratory distress.   Neurological: She is alert and oriented to person, place, and time. She has normal strength. She has a normal Finger-Nose-Finger Test and a normal Romberg Test. Gait normal.   Skin: Skin is warm. Capillary refill takes less than 2 seconds.   Psychiatric: She has a normal mood and affect. Her speech is normal and behavior is normal. Judgment and thought content normal.   Vitals reviewed.      Assessment/Plan:       Nely was seen today for altered mental status.    Diagnoses and all orders for this visit:    Disorientation  -     EEG Awake or Drowsy Routine  -     CT Head Without Contrast    Medication monitoring encounter  -     Gabapentin Level; Future  -     Drug Screen 11 w/Conf, Serum; Future  -     EEG Awake or Drowsy Routine  -     nortriptyline (PAMELOR) 25 MG capsule; Take 1 capsule by mouth Every Night.    Pain syndrome, chronic    Myofascial pain syndrome    Chronic insomnia  -     nortriptyline (PAMELOR) 25 MG capsule; Take 1 capsule by mouth Every Night.    Patient has picked up gabapentin on 5/20/2020.  I would like to check levels today, she has had complete labs including TSH through her cardiologist yesterday, she does not have these results.  Neurocognitive testing indicated disassociative disorder and recommended psychiatric follow-up.  She is can establish again with [facility]  I did order CT of the head and EEG at last visit but she did not keep appointment, these tests have been reordered, rule out tumor or lesion as well as seizures  She is asked me to restart her nortriptyline for sleep, recommend she stop baclofen use concurrently  Follow-up  in 6 months or sooner if needed, once labs are reviewed and if appropriate gabapentin refill will be sent in for 6 months           Reviewed medications, potential side effects and signs and symptoms to report. Discussed risk versus benefits of treatment plan with patient and/or family-including medications, labs and radiology that may be ordered. Addressed questions and concerns during visit. Patient and/or family verbalized understanding and agree with plan.      Adeola Patricio, APRN  6/2/2020

## 2020-06-06 LAB
AMPHETAMINES SERPL QL SCN: NEGATIVE NG/ML
BARBITURATES SERPLBLD QL: NEGATIVE UG/ML
BENZODIAZ SERPL QL: NEGATIVE NG/ML
BZE BLD QL SCN: NEGATIVE NG/ML
ETHANOL BLD-MCNC: NEGATIVE GM/DL
METHADONE UR QL: NEGATIVE NG/ML
OPIATES SERPL QL SCN: NEGATIVE NG/ML
OXYCODONE SERPL-MCNC: NEGATIVE NG/ML
PCP SPEC-MCNC: NEGATIVE NG/ML
PROPOXYPH SPEC QL: NEGATIVE NG/ML
THC SERPLBLD CFM-MCNC: NEGATIVE NG/ML

## 2020-06-08 LAB — GABAPENTIN SERPLBLD-MCNC: 9.2 UG/ML (ref 4–16)

## 2020-06-08 NOTE — PROGRESS NOTES
Gabapentin level appropriate, drug screen appropriate as well.  Please call in 30-day supply of gabapentin with 5 refills

## 2020-07-21 ENCOUNTER — OFFICE VISIT (OUTPATIENT)
Dept: INTERNAL MEDICINE | Facility: CLINIC | Age: 50
End: 2020-07-21

## 2020-07-21 VITALS
WEIGHT: 171.38 LBS | BODY MASS INDEX: 30.37 KG/M2 | HEART RATE: 75 BPM | HEIGHT: 63 IN | SYSTOLIC BLOOD PRESSURE: 120 MMHG | TEMPERATURE: 98 F | OXYGEN SATURATION: 95 % | DIASTOLIC BLOOD PRESSURE: 75 MMHG | RESPIRATION RATE: 18 BRPM

## 2020-07-21 DIAGNOSIS — R73.9 HYPERGLYCEMIA: ICD-10-CM

## 2020-07-21 DIAGNOSIS — R63.5 WEIGHT GAIN: ICD-10-CM

## 2020-07-21 DIAGNOSIS — R79.9 ABNORMAL FINDING OF BLOOD CHEMISTRY, UNSPECIFIED: ICD-10-CM

## 2020-07-21 DIAGNOSIS — M25.50 POLYARTHRALGIA: ICD-10-CM

## 2020-07-21 DIAGNOSIS — Z86.79 HISTORY OF SICK SINUS SYNDROME: ICD-10-CM

## 2020-07-21 DIAGNOSIS — G89.4 CHRONIC PAIN SYNDROME: Primary | ICD-10-CM

## 2020-07-21 DIAGNOSIS — E66.9 CLASS 1 OBESITY WITH SERIOUS COMORBIDITY AND BODY MASS INDEX (BMI) OF 30.0 TO 30.9 IN ADULT, UNSPECIFIED OBESITY TYPE: ICD-10-CM

## 2020-07-21 DIAGNOSIS — Z11.59 NEED FOR HEPATITIS C SCREENING TEST: ICD-10-CM

## 2020-07-21 DIAGNOSIS — R53.82 CHRONIC FATIGUE: ICD-10-CM

## 2020-07-21 DIAGNOSIS — E55.9 VITAMIN D DEFICIENCY: ICD-10-CM

## 2020-07-21 DIAGNOSIS — I10 ESSENTIAL HYPERTENSION: ICD-10-CM

## 2020-07-21 DIAGNOSIS — Z95.0 PACEMAKER: ICD-10-CM

## 2020-07-21 PROCEDURE — 99214 OFFICE O/P EST MOD 30 MIN: CPT | Performed by: FAMILY MEDICINE

## 2020-07-21 RX ORDER — DULOXETIN HYDROCHLORIDE 60 MG/1
60 CAPSULE, DELAYED RELEASE ORAL DAILY
COMMUNITY
Start: 2020-06-24 | End: 2021-01-25

## 2020-07-21 NOTE — PROGRESS NOTES
Subjective    Nely Castrejon is a 50 y.o. female here for:  Chief Complaint   Patient presents with   • Fatigue     Pt states her arthritis doctor wants her to have her thyroid and hormone levels checked because she is tired  all the time and has gained a lot of weight.    • Back Pain     Arthritis doctor wanted her to have a referral to a pain management doctor so he told her to come to her PCP.    • Heart Problem     Pt needs a new referral to a cardiologist because Dr. Frost has moved to [place].        History per MA reviewed.    History of Present Illness     Back pain chronic issue. Followed by rheumatology, Dr. Owens. Was told previously she had rheumatoid arthritis and took methotrexate for a period, then told by Dr. Owens she does not have it. On Cymbalta for chronic pain. Was with pain management in past on narcotic but stopped going, was doing okay but now in more pain.    SSS s/p pacemaker placement. Cardiologist left, needs new provider in town.     Hypertension chronic, stable on ARB-diuretic, beta blocker. Blood pressure potentially worsened by NSAID use.     Obesity new issue in last few years, rapid weight gain she feels w/o lifestyle changes. On gabapentin for chronic pain x years.     Fatigue chronic, no history of CE but does not wake refreshed.  snores, she's not sure if she does. Having trouble losing weight.    The following portions of the patient's history were reviewed and updated as appropriate: allergies, current medications, past family history, past medical history, past social history, past surgical history and problem list.    Review of Systems   Constitutional: Positive for fatigue and unexpected weight gain. Negative for fever.   Respiratory: Negative for cough and shortness of breath.    Cardiovascular: Negative for chest pain.   Musculoskeletal: Positive for arthralgias and back pain.   Psychiatric/Behavioral: Positive for stress.       Visit Vitals  /75   Pulse 75  "  Temp 98 °F (36.7 °C) (Temporal)   Resp 18   Ht 160 cm (62.99\")   Wt 77.7 kg (171 lb 6 oz)   SpO2 95%   BMI 30.37 kg/m²         Objective   Physical Exam   Constitutional: She is oriented to person, place, and time. Vital signs are normal. She appears well-developed and well-nourished. She is active.  Non-toxic appearance. She does not have a sickly appearance. She does not appear ill. No distress. Face mask in place. She is obese (mildly).  HENT:   Head: Normocephalic and atraumatic.   Right Ear: Hearing normal.   Left Ear: Hearing normal.   Eyes: EOM are normal. Right eye exhibits no discharge. Left eye exhibits no discharge. No scleral icterus.   Neck: Phonation normal. Neck supple.   Cardiovascular: Normal rate and regular rhythm.   Pulmonary/Chest: Effort normal and breath sounds normal.       Neurological: She is alert and oriented to person, place, and time. She displays no tremor. No cranial nerve deficit.   Skin: Skin is warm. No rash noted. She is not diaphoretic. No pallor.   Psychiatric: She has a normal mood and affect. Her speech is normal and behavior is normal. Judgment and thought content normal. Cognition and memory are normal.   Nursing note and vitals reviewed.        Assessment/Plan     Problem List Items Addressed This Visit        Cardiovascular and Mediastinum    History of sick sinus syndrome    Relevant Orders    Ambulatory Referral to Cardiology    Essential hypertension    Relevant Orders    Lipid Panel    Pacemaker    Relevant Orders    Ambulatory Referral to Cardiology       Nervous and Auditory    Chronic pain syndrome - Primary    Relevant Orders    Ambulatory Referral to Pain Management      Other Visit Diagnoses     Chronic fatigue        Relevant Orders    TSH+Free T4    Vitamin B12    Folate    Vitamin D 25 Hydroxy    CBC (No Diff)    Comprehensive Metabolic Panel    Lipid Panel    Hepatitis C Antibody    Iron Profile    Ferritin    Weight gain        Relevant Orders    TSH+Free " T4    Hemoglobin A1c    Class 1 obesity with serious comorbidity and body mass index (BMI) of 30.0 to 30.9 in adult, unspecified obesity type        Relevant Orders    TSH+Free T4    Hemoglobin A1c    Vitamin D deficiency        Relevant Orders    Vitamin D 25 Hydroxy    Hyperglycemia        Relevant Orders    Hemoglobin A1c    Abnormal finding of blood chemistry, unspecified         Relevant Orders    Iron Profile    Ferritin    C-reactive Protein    Sedimentation Rate    CED by IFA, Reflex 9-biomarkers profile    Cyclic Citrul Peptide Antibody, IgG / IgA    Rheumatoid Factor    Polyarthralgia        Relevant Orders    C-reactive Protein    Sedimentation Rate    CED by IFA, Reflex 9-biomarkers profile    Cyclic Citrul Peptide Antibody, IgG / IgA    Rheumatoid Factor    Need for hepatitis C screening test        Relevant Orders    Hepatitis C Antibody          · Patient's Body mass index is 30.37 kg/m². BMI is above normal parameters. Recommendations include: nutrition counseling (calorie counting, apps like [employer]).  Return in about 3 months (around 10/21/2020) for Medicare Wellness.      Jie Mayo MD

## 2020-07-22 LAB
25(OH)D3+25(OH)D2 SERPL-MCNC: 38.8 NG/ML (ref 30–100)
ALBUMIN SERPL-MCNC: 4 G/DL (ref 3.5–5.2)
ALBUMIN/GLOB SERPL: 1.8 G/DL
ALP SERPL-CCNC: 81 U/L (ref 39–117)
ALT SERPL-CCNC: 34 U/L (ref 1–33)
AST SERPL-CCNC: 30 U/L (ref 1–32)
BILIRUB SERPL-MCNC: 0.3 MG/DL (ref 0–1.2)
BUN SERPL-MCNC: 23 MG/DL (ref 6–20)
BUN/CREAT SERPL: 25.6 (ref 7–25)
CALCIUM SERPL-MCNC: 9.2 MG/DL (ref 8.6–10.5)
CHLORIDE SERPL-SCNC: 104 MMOL/L (ref 98–107)
CHOLEST SERPL-MCNC: 204 MG/DL (ref 0–200)
CO2 SERPL-SCNC: 25.8 MMOL/L (ref 22–29)
CREAT SERPL-MCNC: 0.9 MG/DL (ref 0.57–1)
ERYTHROCYTE [DISTWIDTH] IN BLOOD BY AUTOMATED COUNT: 11.9 % (ref 12.3–15.4)
FERRITIN SERPL-MCNC: 45.8 NG/ML (ref 13–150)
FOLATE SERPL-MCNC: 6.75 NG/ML (ref 4.78–24.2)
GLOBULIN SER CALC-MCNC: 2.2 GM/DL
GLUCOSE SERPL-MCNC: 90 MG/DL (ref 65–99)
HBA1C MFR BLD: 5.7 % (ref 4.8–5.6)
HCT VFR BLD AUTO: 36.7 % (ref 34–46.6)
HCV AB S/CO SERPL IA: 0.2 S/CO RATIO (ref 0–0.9)
HDLC SERPL-MCNC: 65 MG/DL (ref 40–60)
HGB BLD-MCNC: 12 G/DL (ref 12–15.9)
IRON SATN MFR SERPL: 17 % (ref 20–50)
IRON SERPL-MCNC: 63 MCG/DL (ref 37–145)
LDLC SERPL CALC-MCNC: 117 MG/DL (ref 0–100)
MCH RBC QN AUTO: 29.3 PG (ref 26.6–33)
MCHC RBC AUTO-ENTMCNC: 32.7 G/DL (ref 31.5–35.7)
MCV RBC AUTO: 89.7 FL (ref 79–97)
PLATELET # BLD AUTO: 221 10*3/MM3 (ref 140–450)
POTASSIUM SERPL-SCNC: 4.6 MMOL/L (ref 3.5–5.2)
PROT SERPL-MCNC: 6.2 G/DL (ref 6–8.5)
RBC # BLD AUTO: 4.09 10*6/MM3 (ref 3.77–5.28)
SODIUM SERPL-SCNC: 137 MMOL/L (ref 136–145)
T4 FREE SERPL-MCNC: 0.89 NG/DL (ref 0.93–1.7)
TIBC SERPL-MCNC: 379 MCG/DL
TRIGL SERPL-MCNC: 110 MG/DL (ref 0–150)
TSH SERPL DL<=0.005 MIU/L-ACNC: 2.05 UIU/ML (ref 0.27–4.2)
UIBC SERPL-MCNC: 316 MCG/DL (ref 112–346)
VIT B12 SERPL-MCNC: 739 PG/ML (ref 211–946)
VLDLC SERPL CALC-MCNC: 22 MG/DL
WBC # BLD AUTO: 6.63 10*3/MM3 (ref 3.4–10.8)

## 2020-07-23 LAB
ANA TITR SER IF: NEGATIVE {TITER}
CCP IGA+IGG SERPL IA-ACNC: 4 UNITS (ref 0–19)
CRP SERPL-MCNC: 0.5 MG/DL (ref 0–0.5)
ERYTHROCYTE [SEDIMENTATION RATE] IN BLOOD BY WESTERGREN METHOD: 7 MM/HR (ref 0–30)
LABORATORY COMMENT REPORT: NORMAL
RHEUMATOID FACT SERPL-ACNC: <10 IU/ML (ref 0–13.9)

## 2020-07-24 NOTE — PROGRESS NOTES
Please let her know labs at this time not suggestive of rheumatoid arthritis, we can watch this over time. Remains pre-diabetic, very early though, cut back on sugar intake. One thyroid level off a bit, we can recheck next visit. Needs to be scheduled for awv in 3 months.

## 2020-08-03 ENCOUNTER — TELEPHONE (OUTPATIENT)
Dept: INTERNAL MEDICINE | Facility: CLINIC | Age: 50
End: 2020-08-03

## 2020-08-03 NOTE — TELEPHONE ENCOUNTER
PT CALLED AND WANTED TO KNOW STATUS OF HER REFERRAL TO PAIN MANAGEMENT; ADVISED PT PER PAC, THAT WE ARE WAITING FOR CLINIC TO CONTACT US WITH APPT    PRAVIN: [phone]

## 2020-08-05 ENCOUNTER — CONSULT (OUTPATIENT)
Dept: CARDIOLOGY | Facility: CLINIC | Age: 50
End: 2020-08-05

## 2020-08-05 VITALS
BODY MASS INDEX: 30.3 KG/M2 | TEMPERATURE: 98 F | HEIGHT: 63 IN | SYSTOLIC BLOOD PRESSURE: 98 MMHG | DIASTOLIC BLOOD PRESSURE: 68 MMHG | WEIGHT: 171 LBS | OXYGEN SATURATION: 98 % | HEART RATE: 76 BPM

## 2020-08-05 DIAGNOSIS — Z95.0 CARDIAC PACEMAKER IN SITU: ICD-10-CM

## 2020-08-05 DIAGNOSIS — Z95.0 PACEMAKER: ICD-10-CM

## 2020-08-05 DIAGNOSIS — Z86.79 HISTORY OF SICK SINUS SYNDROME: Primary | ICD-10-CM

## 2020-08-05 DIAGNOSIS — I10 ESSENTIAL HYPERTENSION: ICD-10-CM

## 2020-08-05 DIAGNOSIS — I48.0 PAROXYSMAL ATRIAL FIBRILLATION (HCC): ICD-10-CM

## 2020-08-05 PROCEDURE — 93280 PM DEVICE PROGR EVAL DUAL: CPT | Performed by: INTERNAL MEDICINE

## 2020-08-05 PROCEDURE — 99204 OFFICE O/P NEW MOD 45 MIN: CPT | Performed by: INTERNAL MEDICINE

## 2020-08-05 RX ORDER — IRBESARTAN 75 MG/1
75 TABLET ORAL DAILY
Qty: 30 TABLET | Refills: 11 | Status: SHIPPED | OUTPATIENT
Start: 2020-08-05 | End: 2021-08-16

## 2020-08-05 NOTE — PROGRESS NOTES
Subjective:     Encounter Date:08/05/2020      Patient ID: Nely Castrejon is a 50 y.o. female.    Chief Complaint: Atrial fibrillation  HPI  This is a 50-year-old female patient who presents to cardiology clinic to establish care after her prior cardiologist changed jobs.  The patient has a dual-chamber rate responsive pacemaker.  The original indication for pacemaker implantation is unknown.  The patient has a history of prior PVC ablation in 2013.  Her pacemaker was implanted shortly thereafter.  Interrogation of her device today demonstrates a [employer] dual-chamber rate responsive pacemaker model #2210.  The patient is paced 62% of the time in the right atrium and less than 1% of the time in the right ventricle.  Right atrial lead interrogation demonstrates P wave sensing of 2.1 mV with a pacing threshold of 0.5 V at 0.4 ms.  Lead impedance is 360 ohms.  Right ventricular lead interrogation demonstrates R wave sensing of 5.1 mV and a pacing threshold of 1.5 V at 0.1 ms.  Lead impedance is 250 ohms.  The patient's right ventricular lead was changed to unipolar from bipolar.  This resulted in an increase in the lead impedance from 100 ohms to 250 ohms.  There was only a slight increase in pacing threshold.  The patient's battery voltage is 2.89 V.  Her underlying rhythm is sinus.  The patient has had 2 high heart rate events.  One was atrial fibrillation at a rate of 160 bpm occurring at 5:30 AM and lasting for 25 minutes.  The patient has no recollection of being awake during this time.  The second episode occurred 1 month later at 6 AM and was an episode of atrial tachycardia at 144 bpm.  This episode lasted for 18 minutes and 20 seconds.  Again the patient does not recall being awake during this timeframe.  She does report having intermittent palpitations which she describes as a fluttering sensation.  This typically occurs only 2 or 3 times per month and only last for a second or 2.  The patient has  no personal history of stroke or TIA.  She does have a history of hypertension.  She is currently on aspirin therapy but has never been on anticoagulation or antiarrhythmic drug therapy.  She has no history of congestive heart failure, valvular heart disease or known vascular disease.  The patient had a vasodilator nuclear stress test in May of this year which showed a calculated ejection fraction of 72%.  There was no evidence of myocardial ischemia or prior myocardial infarction.  The patient has no chest discomfort at rest or with activity.  There is no exertional chest arm neck jaw shoulder or back discomfort.  There is no orthopnea PND or lower extremity edema.  There is no dizziness  or syncope.  She is a lifelong non-smoker.  The following portions of the patient's history were reviewed and updated as appropriate: allergies, current medications, past family history, past medical history, past social history, past surgical history and problem  Review of Systems   Constitution: Negative for chills, diaphoresis, fever, malaise/fatigue, weight gain and weight loss.   HENT: Negative for ear discharge, hearing loss, hoarse voice and nosebleeds.    Eyes: Negative for discharge, double vision, pain and photophobia.   Cardiovascular: Positive for palpitations. Negative for chest pain, claudication, cyanosis, dyspnea on exertion, irregular heartbeat, leg swelling, near-syncope, orthopnea, paroxysmal nocturnal dyspnea and syncope.   Respiratory: Negative for cough, hemoptysis, shortness of breath, sputum production and wheezing.    Endocrine: Negative for cold intolerance, heat intolerance, polydipsia, polyphagia and polyuria.   Hematologic/Lymphatic: Negative for adenopathy and bleeding problem. Does not bruise/bleed easily.   Skin: Negative for color change, flushing, itching and rash.   Musculoskeletal: Negative for muscle cramps, muscle weakness, myalgias and stiffness.   Gastrointestinal: Negative for abdominal  pain, diarrhea, hematemesis, hematochezia, nausea and vomiting.   Genitourinary: Negative for dysuria, frequency and nocturia.   Neurological: Negative for focal weakness, loss of balance, numbness, paresthesias and seizures.   Psychiatric/Behavioral: Negative for altered mental status, hallucinations and suicidal ideas.   Allergic/Immunologic: Negative for HIV exposure, hives and persistent infections.           Current Outpatient Medications:   •  cetirizine (zyrTEC) 10 MG tablet, Take 10 mg by mouth Daily., Disp: , Rfl:   •  Cholecalciferol (VITAMIN D3) 125 MCG (5000 UT) capsule capsule, Take 5,000 Units by mouth Daily., Disp: , Rfl:   •  Cyanocobalamin (B-12) 1000 MCG capsule, take 1 capsule by mouth daily, Disp: 60 capsule, Rfl: 6  •  DULoxetine (CYMBALTA) 30 MG capsule, TK 1 C PO QD, Disp: , Rfl:   •  DULoxetine (CYMBALTA) 60 MG capsule, , Disp: , Rfl:   •  gabapentin (NEURONTIN) 600 MG tablet, Take 1 tablet by mouth 4 (Four) Times a Day., Disp: 120 tablet, Rfl: 5  •  irbesartan (Avapro) 75 MG tablet, Take 1 tablet by mouth Daily., Disp: 30 tablet, Rfl: 11  •  metoprolol succinate XL (TOPROL-XL) 100 MG 24 hr tablet, Take 1 tablet by mouth Daily., Disp: , Rfl: 0  •  nortriptyline (PAMELOR) 25 MG capsule, Take 1 capsule by mouth Every Night., Disp: 30 capsule, Rfl: 5  •  RABEprazole (ACIPHEX) 20 MG EC tablet, Take 1 tablet by mouth Daily., Disp: 90 tablet, Rfl: 3  •  rivaroxaban (Xarelto) 20 MG tablet, Take 1 tablet by mouth Daily With Dinner., Disp: 30 tablet, Rfl: 11    Objective:   Physical Exam   Constitutional: She is oriented to person, place, and time. She appears well-developed and well-nourished. No distress.   HENT:   Head: Normocephalic and atraumatic.   Mouth/Throat: Oropharynx is clear and moist.   Eyes: Pupils are equal, round, and reactive to light. Conjunctivae and EOM are normal. No scleral icterus.   Neck: Normal range of motion. Neck supple. No JVD present. No tracheal deviation present. No  "thyromegaly present.   Cardiovascular: Normal rate, regular rhythm, S1 normal, S2 normal, normal heart sounds, intact distal pulses and normal pulses. PMI is not displaced. Exam reveals no gallop and no friction rub.   No murmur heard.  Pulmonary/Chest: Effort normal and breath sounds normal. No stridor. No respiratory distress. She has no wheezes. She has no rales.   Abdominal: Soft. Bowel sounds are normal. She exhibits no distension and no mass. There is no tenderness. There is no rebound and no guarding.   Musculoskeletal: Normal range of motion. She exhibits no edema or deformity.   Neurological: She is alert and oriented to person, place, and time. She displays normal reflexes. No cranial nerve deficit. Coordination normal.   Skin: Skin is warm and dry. No rash noted. She is not diaphoretic. No erythema.   Psychiatric: She has a normal mood and affect. Her behavior is normal. Thought content normal.     Blood pressure 98/68, pulse 76, temperature 98 °F (36.7 °C), temperature source Temporal, height 160 cm (62.99\"), weight 77.6 kg (171 lb), SpO2 98 %.   Lab Review:     Assessment:       1. History of sick sinus syndrome  Normal pacemaker function.    2. Essential hypertension  The patient's blood pressure is considerably low today.  She is somewhat symptomatic with feeling fatigued and lightheaded with posture change.    3. Pacemaker  The patient has a normally functioning [employer] dual-chamber rate responsive pacemaker.  Due to a very low right ventricular lead impedance, she was changed to unipolar sensing with subsequent improvement in lead impedance to 250 ohms.  She had only a slight increase in pacing threshold from 0.25 V at 1.0 ms to 1.5 V at 1.0 ms.    4. Paroxysmal atrial fibrillation (CMS/HCC)  The patient continues to have palpitations which sound suspicious for PVCs.  She has had a prior PVC ablation.  Pacemaker interrogation shows one episode of sustained paroxysmal atrial tachycardia and one " episode of sustained paroxysmal atrial fibrillation.    Procedures    Plan:     I have recommended discontinuation of aspirin therapy.  I have recommended initiation of Xarelto 20 mg orally once per day.  I have recommended decreasing her Avapro to 75 mg/day.  I have recommended discontinuation of hydrochlorothiazide.  No additional cardiovascular testing is warranted.  The patient will be enrolled in our regular outpatient pacemaker clinic.

## 2020-08-18 ENCOUNTER — HOSPITAL ENCOUNTER (OUTPATIENT)
Dept: NUTRITION | Facility: HOSPITAL | Age: 50
Discharge: HOME | End: 2020-08-18
Payer: MEDICARE

## 2020-08-18 VITALS — BODY MASS INDEX: 23.91 KG/M2 | HEIGHT: 63 IN

## 2020-08-18 PROCEDURE — 97802 MEDICAL NUTRITION INDIV IN: CPT

## 2020-08-18 NOTE — PROGRESS NOTES
Nutrition Education    · Verbally reviewed information with Patient  · Educated on Low sodium, heart healthy diet, fluid restriction, shopping tips, recipe for no sodium seasoning mix, My Plate Method. · Written educational materials provided. · Contact name and number provided. · Refer to Patient Education activity for more details.     Electronically signed by Lizabeth Villalba on 8/18/20 at 1:17 PM EDT

## 2020-08-21 ENCOUNTER — TELEPHONE (OUTPATIENT)
Dept: INTERNAL MEDICINE | Facility: CLINIC | Age: 50
End: 2020-08-21

## 2020-08-21 NOTE — TELEPHONE ENCOUNTER
PATIENT STATED THAT A REFERRAL WAS PUT IN ABOUT 6 WEEKS AGO FOR A PAIN CLINIC     SHE HAS NOT HEARD ANYTHING REGARDING AN APPOINTMENT     PLEASE CALL AND ADVISE [phone]

## 2020-08-26 ENCOUNTER — TELEPHONE (OUTPATIENT)
Dept: CARDIOLOGY | Facility: CLINIC | Age: 50
End: 2020-08-26

## 2020-09-11 ENCOUNTER — OFFICE VISIT (OUTPATIENT)
Dept: INTERNAL MEDICINE | Facility: CLINIC | Age: 50
End: 2020-09-11

## 2020-09-11 VITALS
HEIGHT: 63 IN | SYSTOLIC BLOOD PRESSURE: 120 MMHG | RESPIRATION RATE: 18 BRPM | OXYGEN SATURATION: 98 % | HEART RATE: 79 BPM | DIASTOLIC BLOOD PRESSURE: 70 MMHG | WEIGHT: 157 LBS | TEMPERATURE: 98.4 F | BODY MASS INDEX: 27.82 KG/M2

## 2020-09-11 DIAGNOSIS — R53.82 CHRONIC FATIGUE: Primary | ICD-10-CM

## 2020-09-11 DIAGNOSIS — R06.09 DYSPNEA ON EXERTION: ICD-10-CM

## 2020-09-11 DIAGNOSIS — Z20.9 EXPOSURE TO POTENTIAL INFECTION: ICD-10-CM

## 2020-09-11 DIAGNOSIS — R22.2 SUPRACLAVICULAR FOSSA FULLNESS: ICD-10-CM

## 2020-09-11 DIAGNOSIS — I48.0 PAROXYSMAL ATRIAL FIBRILLATION (HCC): ICD-10-CM

## 2020-09-11 DIAGNOSIS — Z95.0 PACEMAKER: ICD-10-CM

## 2020-09-11 DIAGNOSIS — R79.9 ABNORMAL BLOOD CHEMISTRY: ICD-10-CM

## 2020-09-11 DIAGNOSIS — E55.9 VITAMIN D DEFICIENCY: ICD-10-CM

## 2020-09-11 DIAGNOSIS — M25.50 POLYARTHRALGIA: ICD-10-CM

## 2020-09-11 PROCEDURE — 99214 OFFICE O/P EST MOD 30 MIN: CPT | Performed by: FAMILY MEDICINE

## 2020-09-11 NOTE — PROGRESS NOTES
"Subjective    Nely Castrejon is a 50 y.o. female here for:  Chief Complaint   Patient presents with   • Arthritis     Pain is getting worse. Nothing makes it better. Mainly in the neck, back, and shoulders. Legs, feet, and hands, have also been hurting.    • Fatigue     Severe. Not improving at all.        History per MA reviewed.    Since adjustments she's hearing alarms on device and heart beating hard at times, almost like \"its stuck and not going to quit\" and it didn't do that before she adjusted it.     Getting shortness of breath with exertion. Also has an abnormal appearance above clavicle on left.     Chronic fatigue. Followed by rheumatology for rheumatoid arthritis. Has not had sleep study. Neurology went up on [facility] recently to help with sleep issues, helped mildly. She has had many tick bites, questions possible Lyme disease.         The following portions of the patient's history were reviewed and updated as appropriate: allergies, current medications, past family history, past medical history, past social history, past surgical history and problem list.    Review of Systems   Constitutional: Positive for fatigue. Negative for fever.   Respiratory: Positive for shortness of breath.    Cardiovascular: Positive for palpitations.   Musculoskeletal: Positive for arthralgias.   Psychiatric/Behavioral: Positive for stress.       Visit Vitals  /70   Pulse 79   Temp 98.4 °F (36.9 °C) (Temporal)   Resp 18   Ht 160 cm (62.99\")   Wt 71.2 kg (157 lb)   SpO2 98%   BMI 27.82 kg/m²         Objective   Physical Exam   Constitutional: She is oriented to person, place, and time. Vital signs are normal. She appears well-developed and well-nourished. She is active.  Non-toxic appearance. She does not have a sickly appearance. She does not appear ill. No distress. Face mask in place. She appears overweight.   HENT:   Head: Normocephalic and atraumatic.   Right Ear: Hearing normal.   Left Ear: Hearing normal.   Eyes: " Conjunctivae and lids are normal. Right eye exhibits no discharge. Left eye exhibits no discharge. No scleral icterus.   Neck: Phonation normal. Neck supple.   Cardiovascular: Normal rate and regular rhythm.   Pulmonary/Chest: Effort normal and breath sounds normal. She exhibits deformity (left supraclavicular area full).   Abdominal: Normal appearance.   Neurological: She is alert and oriented to person, place, and time. She displays no tremor. No cranial nerve deficit. She displays no seizure activity. Gait normal.   Skin: Skin is warm. No rash noted. She is not diaphoretic. No pallor.   Psychiatric: Her speech is normal and behavior is normal. Mood, memory, affect, judgment and thought content normal.   Nursing note and vitals reviewed.    Reviewed:  Progress Notes by Shimon Cortez MD (08/06/2020 15:00)  Progress Notes by Jm Mills MD (08/05/2020 14:45)      Assessment/Plan     Problem List Items Addressed This Visit        Cardiovascular and Mediastinum    Pacemaker    Paroxysmal atrial fibrillation (CMS/HCC)      Other Visit Diagnoses     Chronic fatigue    -  Primary    Relevant Orders    TSH+Free T4 (Completed)    Ambulatory Referral to Sleep Medicine    SURINDER,PE and FLC, Serum (Completed)    PTH, Intact (Completed)    Comprehensive Metabolic Panel (Completed)    Vitamin D 25 Hydroxy (Completed)    Calcium, Ionized (Completed)    Lyme Disease, Western Blot (Completed)    Vitamin D deficiency        Relevant Orders    PTH, Intact (Completed)    Comprehensive Metabolic Panel (Completed)    Vitamin D 25 Hydroxy (Completed)    Calcium, Ionized (Completed)    Polyarthralgia        Relevant Orders    SURINDER,PE and FLC, Serum (Completed)    Lyme Disease, Western Blot (Completed)    Abnormal blood chemistry        Relevant Orders    Ehrlichia Antibody Panel (Completed)    TSH+Free T4 (Completed)    SURINDER,PE and FLC, Serum (Completed)    PTH, Intact (Completed)    Comprehensive Metabolic Panel (Completed)     Vitamin D 25 Hydroxy (Completed)    Calcium, Ionized (Completed)    Lyme Disease, Western Blot (Completed)    Exposure to potential infection        Relevant Orders    Ehrlichia Antibody Panel (Completed)    Lyme Disease, Western Blot (Completed)    Dyspnea on exertion        Relevant Orders    CT Chest With Contrast    Supraclavicular fossa fullness        Relevant Orders    SURINDER,PE and FLC, Serum (Completed)    CT Chest With Contrast          · Follow up with cardiology regarding pacemaker, continue anticoagulation for Afib. In SR today during exam.    Jie Mayo MD

## 2020-09-16 ENCOUNTER — HOSPITAL ENCOUNTER (OUTPATIENT)
Dept: CT IMAGING | Facility: HOSPITAL | Age: 50
Discharge: HOME | End: 2020-09-16
Admitting: FAMILY MEDICINE

## 2020-09-16 ENCOUNTER — TELEPHONE (OUTPATIENT)
Dept: INTERNAL MEDICINE | Facility: CLINIC | Age: 50
End: 2020-09-16

## 2020-09-16 PROCEDURE — 71260 CT THORAX DX C+: CPT

## 2020-09-16 PROCEDURE — 25010000002 IOPAMIDOL 61 % SOLUTION: Performed by: FAMILY MEDICINE

## 2020-09-16 RX ADMIN — IOPAMIDOL 100 ML: 612 INJECTION, SOLUTION INTRAVENOUS at 10:25

## 2020-09-16 NOTE — TELEPHONE ENCOUNTER
During my video visit I had a call I could not take. It was radiologist letting me know about her scan.     Result for scan not back yet but I received a call/voicemail from radiologist, she has some findings that can be seen with covid. Please ask her to go to [facility] for evaluation/testing.

## 2020-09-16 NOTE — TELEPHONE ENCOUNTER
Spoke with patient and instructed to go to [facility] for COVID test based on prelim of CT results. Pt understands. She states her  was tested last week prior to surgery and his Covid test was negative.

## 2020-09-17 NOTE — PROGRESS NOTES
conducted a Follow up consultation and Spiritual Assessment for Braeden Valencia, who is a 79 y.o.,female. The  provided the following Interventions:  Continued the relationship of care and support. Listened empathically. Offered prayer and assurance of continued prayer on patients behalf. Chart reviewed. The following outcomes were achieved:  Patient expressed gratitude for pastoral care visit. Assessment:  There are no spiritual or [employer] issues which require intervention at this time. Plan:  Chaplains will continue to follow and will provide pastoral care on an as needed/requested basis.  recommends bedside caregivers page  on duty if patient shows signs of acute spiritual or emotional distress.        Sister Thea Dutta [place], [address]  [phone] Please let her know if she sets up my chart she can see full report on ct scan. It doesn't mention possible covid, just says possible infectious issue. Findings could be related to rheumatoid arthritis as well. The area of puffiness, reason for scan, no concerns on scan. Okay to watch area. Lyme lab is still pending.

## 2020-09-18 LAB
25(OH)D3+25(OH)D2 SERPL-MCNC: 52 NG/ML (ref 30–100)
A PHAGOCYTOPH IGG TITR SER IF: NEGATIVE {TITER}
A PHAGOCYTOPH IGM TITR SER IF: NEGATIVE {TITER}
ALBUMIN SERPL ELPH-MCNC: 3.7 G/DL (ref 2.9–4.4)
ALBUMIN SERPL-MCNC: 4.6 G/DL (ref 3.5–5.2)
ALBUMIN/GLOB SERPL: 1.4 {RATIO} (ref 0.7–1.7)
ALBUMIN/GLOB SERPL: 2.6 G/DL
ALP SERPL-CCNC: 71 U/L (ref 39–117)
ALPHA1 GLOB SERPL ELPH-MCNC: 0.2 G/DL (ref 0–0.4)
ALPHA2 GLOB SERPL ELPH-MCNC: 0.7 G/DL (ref 0.4–1)
ALT SERPL-CCNC: 20 U/L (ref 1–33)
AST SERPL-CCNC: 18 U/L (ref 1–32)
B BURGDOR IGG PATRN SER IB-IMP: NEGATIVE
B BURGDOR IGM PATRN SER IB-IMP: NEGATIVE
B BURGDOR18KD IGG SER QL IB: ABNORMAL
B BURGDOR23KD IGG SER QL IB: ABNORMAL
B BURGDOR23KD IGM SER QL IB: ABNORMAL
B BURGDOR28KD IGG SER QL IB: ABNORMAL
B BURGDOR30KD IGG SER QL IB: ABNORMAL
B BURGDOR39KD IGG SER QL IB: ABNORMAL
B BURGDOR39KD IGM SER QL IB: ABNORMAL
B BURGDOR41KD IGG SER QL IB: PRESENT
B BURGDOR41KD IGM SER QL IB: ABNORMAL
B BURGDOR45KD IGG SER QL IB: ABNORMAL
B BURGDOR58KD IGG SER QL IB: ABNORMAL
B BURGDOR66KD IGG SER QL IB: ABNORMAL
B BURGDOR93KD IGG SER QL IB: ABNORMAL
B-GLOBULIN SERPL ELPH-MCNC: 0.8 G/DL (ref 0.7–1.3)
BILIRUB SERPL-MCNC: 0.6 MG/DL (ref 0–1.2)
BUN SERPL-MCNC: 14 MG/DL (ref 6–20)
BUN/CREAT SERPL: 17.5 (ref 7–25)
CA-I SERPL ISE-MCNC: 5.3 MG/DL (ref 4.5–5.6)
CALCIUM SERPL-MCNC: 9.4 MG/DL (ref 8.6–10.5)
CHLORIDE SERPL-SCNC: 102 MMOL/L (ref 98–107)
CO2 SERPL-SCNC: 26.6 MMOL/L (ref 22–29)
CREAT SERPL-MCNC: 0.8 MG/DL (ref 0.57–1)
E CHAFFEENSIS IGG TITR SER IF: NEGATIVE {TITER}
E CHAFFEENSIS IGM TITR SER IF: NEGATIVE {TITER}
GAMMA GLOB SERPL ELPH-MCNC: 0.9 G/DL (ref 0.4–1.8)
GLOBULIN SER CALC-MCNC: 1.8 GM/DL
GLOBULIN SER-MCNC: 2.7 G/DL (ref 2.2–3.9)
GLUCOSE SERPL-MCNC: 89 MG/DL (ref 65–99)
IGA SERPL-MCNC: 134 MG/DL (ref 87–352)
IGG SERPL-MCNC: 973 MG/DL (ref 586–1602)
IGM SERPL-MCNC: 51 MG/DL (ref 26–217)
INTERPRETATION SERPL IEP-IMP: NORMAL
KAPPA LC FREE SER-MCNC: 10.2 MG/L (ref 3.3–19.4)
KAPPA LC FREE/LAMBDA FREE SER: 0.82 {RATIO} (ref 0.26–1.65)
LABORATORY COMMENT REPORT: NORMAL
LAMBDA LC FREE SERPL-MCNC: 12.4 MG/L (ref 5.7–26.3)
M PROTEIN SERPL ELPH-MCNC: NORMAL G/DL
POTASSIUM SERPL-SCNC: 4.2 MMOL/L (ref 3.5–5.2)
PROT SERPL-MCNC: 6.4 G/DL (ref 6–8.5)
PTH-INTACT SERPL-MCNC: 13 PG/ML (ref 15–65)
SODIUM SERPL-SCNC: 138 MMOL/L (ref 136–145)
T4 FREE SERPL-MCNC: 1.25 NG/DL (ref 0.93–1.7)
TSH SERPL DL<=0.005 MIU/L-ACNC: 1.7 UIU/ML (ref 0.27–4.2)

## 2020-10-20 RX ORDER — BACLOFEN 10 MG/1
TABLET ORAL
OUTPATIENT
Start: 2020-10-20

## 2020-10-20 NOTE — TELEPHONE ENCOUNTER
Patient last seen 8/6/20 and has a follow up 12/2/20. I dont see where you or Dr Cortez have prescribed this before.

## 2020-10-23 ENCOUNTER — OFFICE VISIT (OUTPATIENT)
Dept: INTERNAL MEDICINE | Facility: CLINIC | Age: 50
End: 2020-10-23

## 2020-10-23 ENCOUNTER — HOSPITAL ENCOUNTER (OUTPATIENT)
Dept: GENERAL RADIOLOGY | Facility: HOSPITAL | Age: 50
Discharge: HOME | End: 2020-10-23
Admitting: FAMILY MEDICINE

## 2020-10-23 VITALS
WEIGHT: 151.75 LBS | OXYGEN SATURATION: 99 % | RESPIRATION RATE: 18 BRPM | HEART RATE: 78 BPM | DIASTOLIC BLOOD PRESSURE: 70 MMHG | HEIGHT: 63 IN | BODY MASS INDEX: 26.89 KG/M2 | SYSTOLIC BLOOD PRESSURE: 130 MMHG

## 2020-10-23 DIAGNOSIS — G89.4 CHRONIC PAIN SYNDROME: ICD-10-CM

## 2020-10-23 DIAGNOSIS — R53.82 CHRONIC FATIGUE: ICD-10-CM

## 2020-10-23 DIAGNOSIS — Z00.00 MEDICARE ANNUAL WELLNESS VISIT, SUBSEQUENT: Primary | ICD-10-CM

## 2020-10-23 DIAGNOSIS — Z86.79 HISTORY OF SICK SINUS SYNDROME: ICD-10-CM

## 2020-10-23 DIAGNOSIS — M54.6 CHRONIC MIDLINE THORACIC BACK PAIN: ICD-10-CM

## 2020-10-23 DIAGNOSIS — I10 ESSENTIAL HYPERTENSION: ICD-10-CM

## 2020-10-23 DIAGNOSIS — M25.50 POLYARTHRALGIA: ICD-10-CM

## 2020-10-23 DIAGNOSIS — G89.29 CHRONIC MIDLINE THORACIC BACK PAIN: ICD-10-CM

## 2020-10-23 DIAGNOSIS — I48.0 PAROXYSMAL ATRIAL FIBRILLATION (HCC): ICD-10-CM

## 2020-10-23 DIAGNOSIS — K21.9 GASTROESOPHAGEAL REFLUX DISEASE, UNSPECIFIED WHETHER ESOPHAGITIS PRESENT: ICD-10-CM

## 2020-10-23 DIAGNOSIS — M79.18 MYOFASCIAL PAIN SYNDROME: ICD-10-CM

## 2020-10-23 DIAGNOSIS — R93.89 ABNORMAL CHEST CT: ICD-10-CM

## 2020-10-23 DIAGNOSIS — M25.552 LEFT HIP PAIN: ICD-10-CM

## 2020-10-23 DIAGNOSIS — Z95.0 PACEMAKER: ICD-10-CM

## 2020-10-23 PROCEDURE — 99396 PREV VISIT EST AGE 40-64: CPT | Performed by: FAMILY MEDICINE

## 2020-10-23 PROCEDURE — G0439 PPPS, SUBSEQ VISIT: HCPCS | Performed by: FAMILY MEDICINE

## 2020-10-23 PROCEDURE — 71046 X-RAY EXAM CHEST 2 VIEWS: CPT

## 2020-10-23 PROCEDURE — 73502 X-RAY EXAM HIP UNI 2-3 VIEWS: CPT

## 2020-10-23 RX ORDER — MELOXICAM 7.5 MG/1
7.5 TABLET ORAL DAILY
COMMUNITY
Start: 2020-10-17 | End: 2021-07-06

## 2020-10-23 RX ORDER — BACLOFEN 10 MG/1
10 TABLET ORAL 3 TIMES DAILY PRN
Qty: 270 TABLET | Refills: 3 | Status: SHIPPED | OUTPATIENT
Start: 2020-10-23 | End: 2022-10-05

## 2020-10-23 RX ORDER — RABEPRAZOLE SODIUM 20 MG/1
20 TABLET, DELAYED RELEASE ORAL DAILY
Qty: 90 TABLET | Refills: 3 | Status: SHIPPED | OUTPATIENT
Start: 2020-10-23 | End: 2021-01-25

## 2020-10-23 NOTE — PATIENT INSTRUCTIONS
Medicare Wellness  Personal Prevention Plan of Service     Date of Office Visit:  10/23/2020  Encounter Provider:  Jie Mayo MD  Place of Service:  [facility]  Patient Name: Nely Castrejon  :  1970    As part of the Medicare Wellness portion of your visit today, we are providing you with this personalized preventive plan of services (PPPS). This plan is based upon recommendations of the [place] Preventive Services Task Force (USPSTF) and the Advisory Committee on Immunization Practices (ACIP).    This lists the preventive care services that should be considered, and provides dates of when you are due. Items listed as completed are up-to-date and do not require any further intervention.    Health Maintenance   Topic Date Due   • ANNUAL WELLNESS VISIT  2017   • TDAP/TD VACCINES (1 - Tdap) 10/01/2021 (Originally 3/16/1989)   • ZOSTER VACCINE (1 of 2) 2021 (Originally 3/16/2019)   • PAP SMEAR  2021   • MAMMOGRAM  2021   • COLONOSCOPY  2030   • HEPATITIS C SCREENING  Completed   • INFLUENZA VACCINE  Completed   • Pneumococcal Vaccine 0-64  Aged Out       Orders Placed This Encounter   Procedures   • XR chest pa and lateral     Order Specific Question:   Reason for Exam:     Answer:   abnormal ct chest 20 ground glass infiltrates   • XR Hip With or Without Pelvis 2 - 3 View Left     Order Specific Question:   Reason for Exam:     Answer:   left hip pain   • Ambulatory Referral to Rheumatology     Referral Priority:   Routine     Referral Type:   Consultation     Referral Reason:   Second Opinion     Requested Specialty:   Rheumatology     Number of Visits Requested:   1   • Ambulatory Referral to Orthopedic Surgery     Referral Priority:   Routine     Referral Type:   Consultation     Referral Reason:   Specialty Services Required     Referred to Provider:   Levi Pereyra MD     Requested Specialty:   Orthopedic Surgery      Number of Visits Requested:   1       Return in about 3 months (around 1/23/2021).

## 2020-10-23 NOTE — PROGRESS NOTES
"The ABCs of the Annual Wellness Visit  Subsequent Medicare Wellness Visit    Chief Complaint   Patient presents with   • Medicare Wellness-subsequent       Subjective   History of Present Illness:  Nely Castrejon is a 50 y.o. female who presents for a Subsequent Medicare Wellness Visit.    Followed by rheumatology due to chronic polyarthralgias, previously diagnosed with rheumatoid arthritis but current rheumatologist has told her she does not have it. Previously took narcotic for pain, patient feels she was able to function better while on that. Currently prescribed by rheumatology NSAID, Cymbalta. Previously used baclofen, which helped some she felt, was stopped previously by another provider as there was concern it was affecting mentation. She does not feel it was the problem when she looks back and would like to try again. Having a lot of back pain along with nerve pain. Left hip painful.     Followed by cardiology due to Afib, sick sinus syndrome s/p pacemaker placement. Last note reviewed:  Consult with Jm Mills MD (08/05/2020)  Xarelto started, aspirin stopped. avapro increased to 75 mg. HCTZ stopped.    On chronic PPI due to GERD.    Previously referred for CT scan chest due to fullness of the left supraclavicular fossa along with shortness of breath, found to have infiltrates that were worrisome for covid-19 per radiology discussion. Pt reports going to outside facility and being tested for covid-19 after that, upon our suggestion, and she says she was negative.   CT Chest With Contrast (09/16/2020 10:25)    Continues to fight chronic fatigue, s/p labs 9/11/20 for many things including tick borne illnesses, negative.    Followed by neurology. Last office note reviewed:  Office Visit with Shimon Cortez MD (08/06/2020)  \"Reports increased insomnia since she stopped nortriptyline, wants to resume. Memory concerns unchanged, going to [facility]. Neuropsych testing suggestive of a dissociative " "d/o\"    HEALTH RISK ASSESSMENT    Recent Hospitalizations:  No hospitalization(s) within the last year.    Current Medical Providers:  Patient Care Team:  Jie Mayo MD as PCP - General (Family Medicine)  Corby Owens MD as Consulting Physician (Rheumatology)  Shimon Cortez MD as Consulting Physician (Neurology)  Cullen Frost MD as Consulting Physician (Interventional Cardiology)    Smoking Status:  Social History     Tobacco Use   Smoking Status Never Smoker   Smokeless Tobacco Never Used       Alcohol Consumption:  Social History     Substance and Sexual Activity   Alcohol Use Never   • Frequency: Never       Depression Screen:   PHQ-2/PHQ-9 Depression Screening 10/23/2020   Little interest or pleasure in doing things 0   Feeling down, depressed, or hopeless 0   Total Score 0       Fall Risk Screen:  STEADI Fall Risk Assessment has not been completed.    Health Habits and Functional and Cognitive Screening:  Functional & Cognitive Status 10/23/2020   Do you have difficulty preparing food and eating? No   Do you have difficulty bathing yourself, getting dressed or grooming yourself? No   Do you have difficulty using the toilet? No   Do you have difficulty moving around from place to place? Yes   Do you have trouble with steps or getting out of a bed or a chair? Yes   Current Diet Well Balanced Diet   Dental Exam Up to date   Eye Exam Up to date   Exercise (times per week) 0 times per week   Current Exercise Activities Include None   Do you need help using the phone?  No   Are you deaf or do you have serious difficulty hearing?  No   Do you need help with transportation? No   Do you need help shopping? No   Do you need help preparing meals?  No   Do you need help with housework?  Yes   Do you need help with laundry? Yes   Do you need help taking your medications? Yes   Do you need help managing money? No   Do you ever drive or ride in a car without wearing a seat belt? No   Have " you felt unusual stress, anger or loneliness in the last month? No   Who do you live with? Spouse   If you need help, do you have trouble finding someone available to you? No   Have you been bothered in the last four weeks by sexual problems? No   Do you have difficulty concentrating, remembering or making decisions? Yes         Does the patient have evidence of cognitive impairment? No    Asprin use counseling:Does not need ASA (and currently is not on it)    Age-appropriate Screening Schedule:  Refer to the list below for future screening recommendations based on patient's age, sex and/or medical conditions. Orders for these recommended tests are listed in the plan section. The patient has been provided with a written plan.    Health Maintenance   Topic Date Due   • TDAP/TD VACCINES (1 - Tdap) 10/01/2021 (Originally 3/16/1989)   • ZOSTER VACCINE (1 of 2) 11/01/2021 (Originally 3/16/2019)   • PAP SMEAR  12/01/2021   • MAMMOGRAM  12/20/2021   • COLONOSCOPY  01/09/2030   • INFLUENZA VACCINE  Completed          The following portions of the patient's history were reviewed and updated as appropriate: allergies, current medications, past family history, past medical history, past social history, past surgical history and problem list.    Outpatient Medications Prior to Visit   Medication Sig Dispense Refill   • cetirizine (zyrTEC) 10 MG tablet Take 10 mg by mouth Daily.     • Cholecalciferol (VITAMIN D3) 125 MCG (5000 UT) capsule capsule Take 5,000 Units by mouth Daily.     • Cyanocobalamin (B-12) 1000 MCG capsule take 1 capsule by mouth daily 60 capsule 6   • DULoxetine (CYMBALTA) 60 MG capsule      • gabapentin (NEURONTIN) 600 MG tablet Take 1 tablet by mouth 4 (Four) Times a Day. 120 tablet 5   • irbesartan (Avapro) 75 MG tablet Take 1 tablet by mouth Daily. 30 tablet 11   • meloxicam (MOBIC) 7.5 MG tablet Take 7.5 mg by mouth Daily.     • metoprolol succinate XL (TOPROL-XL) 100 MG 24 hr tablet Take 1 tablet by mouth  "Daily.  0   • nortriptyline (PAMELOR) 25 MG capsule Take 2 capsules by mouth Every Night. 60 capsule 5   • rivaroxaban (Xarelto) 20 MG tablet Take 1 tablet by mouth Daily With Dinner. 30 tablet 11   • RABEprazole (ACIPHEX) 20 MG EC tablet Take 1 tablet by mouth Daily. 90 tablet 3     No facility-administered medications prior to visit.        Patient Active Problem List   Diagnosis   • Cervical spondylosis   • Lumbar radiculopathy   • Lumbar spondylosis   • Headache, migraine   • Myofascial pain syndrome   • Pain syndrome, chronic   • Paroxysmal hemicrania   • History of sick sinus syndrome   • Chronic insomnia   • Chronic pain syndrome   • B12 deficiency   • Essential hypertension   • GERD (gastroesophageal reflux disease)   • Pacemaker   • Paroxysmal atrial fibrillation (CMS/[facility])       Advanced Care Planning:  ACP discussion was held with the patient during this visit. Patient does not have an advance directive, declines further assistance.    Review of Systems   Constitutional: Positive for fatigue. Negative for fever.   Eyes: Positive for visual disturbance.   Respiratory: Positive for shortness of breath.    Musculoskeletal: Positive for arthralgias, back pain, gait problem, joint swelling and myalgias.   Neurological: Positive for numbness.        Memory changes   Psychiatric/Behavioral: Positive for confusion.   All other systems reviewed and are negative.      Compared to one year ago, the patient feels her physical health is worse.  Compared to one year ago, the patient feels her mental health is the same.    Reviewed chart for potential of high risk medication in the elderly: yes  Reviewed chart for potential of harmful drug interactions in the elderly:yes    Objective         Vitals:    10/23/20 1026   BP: 130/70   Pulse: 78   Resp: 18   SpO2: 99%   Weight: 68.8 kg (151 lb 12 oz)   Height: 160 cm (62.99\")   PainSc:   7       Body mass index is 26.89 kg/m².  Discussed the patient's BMI with her. The BMI is " above average; BMI management plan is completed.    Physical Exam  Vitals signs and nursing note reviewed.   Constitutional:       General: She is not in acute distress.     Appearance: Normal appearance. She is well-developed, well-groomed and overweight. She is not ill-appearing, toxic-appearing or diaphoretic.      Interventions: Face mask in place.   HENT:      Head: Normocephalic and atraumatic. Hair is normal.      Right Ear: Hearing, tympanic membrane, ear canal and external ear normal.      Left Ear: Hearing, tympanic membrane, ear canal and external ear normal.   Eyes:      General: Lids are normal. Gaze aligned appropriately. No scleral icterus.        Right eye: No discharge.         Left eye: No discharge.      Extraocular Movements: Extraocular movements intact.      Conjunctiva/sclera: Conjunctivae normal.      Pupils: Pupils are equal, round, and reactive to light.   Neck:      Musculoskeletal: Neck supple.      Thyroid: No thyromegaly.      Trachea: Trachea and phonation normal. No tracheal deviation.   Cardiovascular:      Rate and Rhythm: Normal rate and regular rhythm.      Heart sounds: Normal heart sounds. No murmur. No friction rub. No gallop.    Pulmonary:      Effort: Pulmonary effort is normal.      Breath sounds: Normal breath sounds.   Chest:      Chest wall: No tenderness.      Comments: Fullness left supraclavicular fossa but no palpable lymph nodes  Abdominal:      General: Bowel sounds are normal. There is no distension.      Palpations: Abdomen is soft. Abdomen is not rigid. There is no mass.      Tenderness: There is no abdominal tenderness. There is no guarding or rebound.   Musculoskeletal:         General: No tenderness or deformity.      Right lower leg: No edema.      Left lower leg: No edema.   Lymphadenopathy:      Head:      Right side of head: No submandibular adenopathy.      Left side of head: No submandibular adenopathy.      Cervical: No cervical adenopathy.   Skin:      General: Skin is warm.      Capillary Refill: Capillary refill takes less than 2 seconds.      Coloration: Skin is not pale.      Findings: No rash.      Nails: There is no clubbing.     Neurological:      General: No focal deficit present.      Mental Status: She is alert and oriented to person, place, and time.      Cranial Nerves: No cranial nerve deficit.      Motor: No tremor, atrophy, abnormal muscle tone or seizure activity.      Gait: Gait normal.   Psychiatric:         Attention and Perception: Attention and perception normal.         Mood and Affect: Mood and affect normal.         Speech: Speech normal.         Behavior: Behavior normal. Behavior is cooperative.         Thought Content: Thought content normal.         Cognition and Memory: Cognition and memory normal.         Judgment: Judgment normal.         Lab Results   Component Value Date    GLU 89 09/11/2020        Assessment/Plan   Medicare Risks and Personalized Health Plan  CMS Preventative Services Quick Reference  Advance Directive Discussion  Breast Cancer/Mammogram Screening  Cardiovascular risk  Chronic Pain   Dementia/Memory   Depression/Dysphoria  Immunizations Discussed/Encouraged (specific immunizations; Td, adacel Tdap and Shingrix )  Polypharmacy    The above risks/problems have been discussed with the patient.  Pertinent information has been shared with the patient in the After Visit Summary.  Follow up plans and orders are seen below in the Assessment/Plan Section.    Diagnoses and all orders for this visit:    1. Medicare annual wellness visit, subsequent (Primary)    2. Paroxysmal atrial fibrillation (CMS/[facility])  Comments:  continue xarelto, follow up with cardiology    3. Chronic fatigue  Comments:  second opinion from rheumatology, discuss possible MS work up with neurology  Orders:  -     Ambulatory Referral to Rheumatology    4. Polyarthralgia  -     Ambulatory Referral to Rheumatology    5. Myofascial pain syndrome  -      baclofen (LIORESAL) 10 MG tablet; Take 1 tablet by mouth 3 (Three) Times a Day As Needed for Muscle Spasms.  Dispense: 270 tablet; Refill: 3    6. Chronic midline thoracic back pain  -     Ambulatory Referral to Orthopedic Surgery    7. Left hip pain  -     Ambulatory Referral to Orthopedic Surgery  -     XR Hip With or Without Pelvis 2 - 3 View Left    8. Chronic pain syndrome  Comments:  discouraged use of narcotics for her chronic pain     9. Essential hypertension  Comments:  continue current medicine, follow up with cardiology    10. Gastroesophageal reflux disease, unspecified whether esophagitis present  -     RABEprazole (ACIPHEX) 20 MG EC tablet; Take 1 tablet by mouth Daily.  Dispense: 90 tablet; Refill: 3    11. Abnormal chest CT  -     XR chest pa and lateral    12. Pacemaker  Comments:  follow up with cardiology; limits imaging modalities (MRI)    13. History of sick sinus syndrome  Comments:  follow up with cardiology      Follow Up:  Return in about 3 months (around 1/23/2021).     An After Visit Summary and PPPS were given to the patient.

## 2020-10-27 ENCOUNTER — HOSPITAL ENCOUNTER (OUTPATIENT)
Dept: GENERAL RADIOLOGY | Facility: HOSPITAL | Age: 50
Discharge: HOME | End: 2020-10-27
Payer: MEDICARE

## 2020-10-27 ENCOUNTER — HOSPITAL ENCOUNTER (OUTPATIENT)
Facility: HOSPITAL | Age: 50
Discharge: HOME | End: 2020-10-27
Payer: MEDICARE

## 2020-10-27 PROCEDURE — 72100 X-RAY EXAM L-S SPINE 2/3 VWS: CPT

## 2020-10-27 PROCEDURE — 72040 X-RAY EXAM NECK SPINE 2-3 VW: CPT

## 2020-11-24 ENCOUNTER — TELEPHONE (OUTPATIENT)
Dept: INTERNAL MEDICINE | Facility: CLINIC | Age: 50
End: 2020-11-24

## 2020-11-24 NOTE — TELEPHONE ENCOUNTER
Magy with [facility] called requesting patient's medical records f # [phone]

## 2020-12-02 ENCOUNTER — OFFICE VISIT (OUTPATIENT)
Dept: NEUROLOGY | Facility: CLINIC | Age: 50
End: 2020-12-02

## 2020-12-02 ENCOUNTER — LAB (OUTPATIENT)
Dept: LAB | Facility: HOSPITAL | Age: 50
End: 2020-12-02

## 2020-12-02 VITALS — BODY MASS INDEX: 26.88 KG/M2 | HEIGHT: 63 IN

## 2020-12-02 DIAGNOSIS — Z51.81 MEDICATION MONITORING ENCOUNTER: ICD-10-CM

## 2020-12-02 DIAGNOSIS — R52 ELECTRIC SHOCK-TYPE PAIN: Primary | ICD-10-CM

## 2020-12-02 DIAGNOSIS — G89.4 PAIN SYNDROME, CHRONIC: ICD-10-CM

## 2020-12-02 DIAGNOSIS — R20.2 PARESTHESIA: ICD-10-CM

## 2020-12-02 PROCEDURE — 80171 DRUG SCREEN QUANT GABAPENTIN: CPT | Performed by: NURSE PRACTITIONER

## 2020-12-02 PROCEDURE — 80307 DRUG TEST PRSMV CHEM ANLYZR: CPT | Performed by: NURSE PRACTITIONER

## 2020-12-02 PROCEDURE — 99213 OFFICE O/P EST LOW 20 MIN: CPT | Performed by: NURSE PRACTITIONER

## 2020-12-02 PROCEDURE — 36415 COLL VENOUS BLD VENIPUNCTURE: CPT | Performed by: NURSE PRACTITIONER

## 2020-12-02 PROCEDURE — 80048 BASIC METABOLIC PNL TOTAL CA: CPT | Performed by: NURSE PRACTITIONER

## 2020-12-02 NOTE — PROGRESS NOTES
Subjective:     Patient ID: Nely Castrejon is a 50 y.o. female.    CC:   Chief Complaint   Patient presents with   • Disorientation       HPI:   History of Present Illness     Ms. Castrejon is here today for follow-up and refills on her gabapentin.  She is a long-term patient of Dr. Cortez followed for many years for several complaints including chronic pain, neck and back pain and episodic periods of disorientation that she has had after several concussions years ago.  She is had a great deal of work-up including EEG that she did not show for.  She is here today just for her regular follow-up and refills on her gabapentin, she is currently taking 600 mg 4 times a day.  She previously was in pain management although she missed a pill count and was apparently dismissed, this was years ago.  She is asking me to refer her back to Dr. Sims for her chronic pain  I have seen her twice in the last year and each time ordered a CT scan of her head which she has not shown for the scheduled test.  She is asking me today to do a work-up for MS.  She tells me that she has been experiencing electrical shock sensations down her spine and also at times on the right side of her face, these are transient and fleeting.  She had spoken to her PCP who suggested that she talk to us about MS work-up.  She tells me that if we reorder CT she will go for scan, she is unable to have MRI due to pacemaker.  She has occasional urinary incontinence, mostly sounds like stress incontinence.  She denies falls or weakness in her legs.  She denies double vision  She has no other new complaints today  The following portions of the patient's history were reviewed and updated as appropriate: allergies, current medications, past family history, past medical history, past social history, past surgical history and problem list.    Past Medical History:   Diagnosis Date   • Arthritis    • Fibromyalgia, primary    • GERD (gastroesophageal reflux disease)    •  "History of osteoarthritis 8/1/2016   • Hypertension    • Primary stabbing headache    • Sick sinus syndrome (CMS/HCC)    • Vitamin B12 deficiency        Past Surgical History:   Procedure Laterality Date   • CHOLECYSTECTOMY     • PACEMAKER IMPLANTATION     • SHOULDER SURGERY      left   • SUBTOTAL HYSTERECTOMY      Partial        Social History     Socioeconomic History   • Marital status:      Spouse name: Not on file   • Number of children: Not on file   • Years of education: Not on file   • Highest education level: Not on file   Tobacco Use   • Smoking status: Never Smoker   • Smokeless tobacco: Never Used   Substance and Sexual Activity   • Alcohol use: Never     Frequency: Never   • Drug use: Never   • Sexual activity: Defer       Family History   Problem Relation Age of Onset   • Arthritis Maternal Grandmother    • Hypertension Maternal Grandmother    • Kidney disease Maternal Grandmother         Review of Systems   Constitutional: Negative.    HENT: Negative.    Eyes: Negative.    Respiratory: Negative.    Cardiovascular: Negative.    Gastrointestinal: Negative.    Endocrine: Negative.    Genitourinary: Negative.    Musculoskeletal: Positive for arthralgias, back pain, myalgias and neck pain.   Skin: Negative.    Allergic/Immunologic: Negative.    Neurological: Positive for numbness. Negative for dizziness, tremors, seizures, syncope, facial asymmetry, speech difficulty, weakness, light-headedness and headaches.   Hematological: Negative.    Psychiatric/Behavioral: Negative.         Objective:  Ht 160 cm (63\")   BMI 26.88 kg/m²     Neurologic Exam     Mental Status   Oriented to person, place, and time.   Speech: speech is normal   Level of consciousness: alert    Cranial Nerves   Cranial nerves II through XII intact.     CN III, IV, VI   Pupils are equal, round, and reactive to light.    Motor Exam   Muscle bulk: normal  Overall muscle tone: normal  Right arm pronator drift: absent  Left arm pronator " drift: absent    Strength   Strength 5/5 throughout.     Sensory Exam   Light touch normal.     Gait, Coordination, and Reflexes     Gait  Gait: normal    Coordination   Romberg: negative  Finger to nose coordination: normal  Heel to shin coordination: normal  Tandem walking coordination: normal    Tremor   Resting tremor: absent  Intention tremor: absent  Action tremor: absent    Reflexes   Reflexes 2+ except as noted.   Right Gimenez: absent  Left Gimenez: absent      Physical Exam  Vitals signs reviewed.   Constitutional:       General: She is not in acute distress.     Appearance: She is well-developed. She is not ill-appearing or toxic-appearing.   HENT:      Head: Normocephalic and atraumatic.      Mouth/Throat:      Mouth: Mucous membranes are moist.   Eyes:      General: No scleral icterus.     Extraocular Movements: Extraocular movements intact.      Conjunctiva/sclera: Conjunctivae normal.      Pupils: Pupils are equal, round, and reactive to light.   Neck:      Musculoskeletal: Normal range of motion and neck supple.   Pulmonary:      Effort: Pulmonary effort is normal. No respiratory distress.   Skin:     General: Skin is warm.      Capillary Refill: Capillary refill takes less than 2 seconds.   Neurological:      Mental Status: She is alert and oriented to person, place, and time.      Coordination: Finger-Nose-Finger Test, Heel to Shin Test and Romberg Test normal.      Gait: Gait is intact. Tandem walk normal.      Deep Tendon Reflexes: Strength normal.   Psychiatric:         Mood and Affect: Mood normal.         Speech: Speech normal.         Behavior: Behavior normal.         Thought Content: Thought content normal.         Judgment: Judgment normal.         Assessment/Plan:       Diagnoses and all orders for this visit:    1. Electric shock-type pain (Primary)  -     CT Head With & Without Contrast  -     Basic Metabolic Panel; Future  -     Basic Metabolic Panel    2. Paresthesia  -     CT Head  With & Without Contrast    3. Medication monitoring encounter  -     Drug Screen 11 w/Conf, Serum; Future  -     Gabapentin Level; Future  -     Drug Screen 11 w/Conf, Serum  -     Gabapentin Level    4. Pain syndrome, chronic  -     Ambulatory Referral to Pain Management    Patient is requesting work-up for multiple sclerosis, she is fearful she may have this as she is having some electrical sensations down her spine.  She does have known cervical disc disease and lumbar disc disease, she is also asking I referred her back to Dr. Sims in pain management.  I have ordered CT scan twice in the last year which she has not shown for appointment, she is encouraged to keep appointment for this.  She is unable to have MRI of the brain  CT scan ordered rule out demyelinating lesions  If indicated we will proceed with lumbar puncture after CT reviewed although her complaints are vague and most likely related to her chronic pain syndrome  Referral placed to pain management per her request  Gabapentin recently filled 11/20/2020, Adin reviewed and appropriate  Drug screen pending, will send in gabapentin referral accordingly  As part of this patient's treatment plan I am prescribing controlled substances. The patient has been made aware of appropriate use of such medications, including potential risk of somnolence, limited ability to drive and/or work safely, and potential for dependence or overdose. It has also been made clear that these medications are for use by the patient only, without concomitant use of alcohol or other substances unless prescribed. Keep out of reach of children.  Adin report has been reviewed. If this is going to be prescribed long term, [facility] Controlled Substance Agreement Contract has also been read and signed by patient and myself.    Follow-up 6 months or sooner if needed         Reviewed medications, potential side effects and signs and symptoms to report. Discussed risk versus benefits of  treatment plan with patient and/or family-including medications, labs and radiology that may be ordered. Addressed questions and concerns during visit. Patient and/or family verbalized understanding and agree with plan.    AS THE PROVIDER, I PERSONALLY WORE PPE DURING ENTIRE FACE TO FACE ENCOUNTER IN CLINIC WITH THE PATIENT. PATIENT ALSO WORE PPE DURING ENTIRE FACE TO FACE ENCOUNTER EXCEPT FOR A MAX OF 30 SECONDS DURING NEUROLOGICAL EVALUATION OF CRANIAL NERVES AND THEN MASK WAS PLACED BACK OVER PATIENT FACE FOR REMAINDER OF VISIT. I WASHED MY HANDS BEFORE AND AFTER VISIT.        Adeola Patricio, APRN  12/2/2020

## 2020-12-03 ENCOUNTER — TELEPHONE (OUTPATIENT)
Dept: NEUROLOGY | Facility: CLINIC | Age: 50
End: 2020-12-03

## 2020-12-03 LAB
ANION GAP SERPL CALCULATED.3IONS-SCNC: 5.6 MMOL/L (ref 5–15)
BUN SERPL-MCNC: 12 MG/DL (ref 6–20)
BUN/CREAT SERPL: 16.9 (ref 7–25)
CALCIUM SPEC-SCNC: 8.8 MG/DL (ref 8.6–10.5)
CHLORIDE SERPL-SCNC: 106 MMOL/L (ref 98–107)
CO2 SERPL-SCNC: 29.4 MMOL/L (ref 22–29)
CREAT SERPL-MCNC: 0.71 MG/DL (ref 0.57–1)
GFR SERPL CREATININE-BSD FRML MDRD: 87 ML/MIN/1.73
GLUCOSE SERPL-MCNC: 99 MG/DL (ref 65–99)
POTASSIUM SERPL-SCNC: 4.4 MMOL/L (ref 3.5–5.2)
SODIUM SERPL-SCNC: 141 MMOL/L (ref 136–145)

## 2020-12-03 NOTE — TELEPHONE ENCOUNTER
FYI  :Called to schedule patient referral with Dr WELLINGTON Sims. She has been discharged from their practice.

## 2020-12-07 LAB
AMPHETAMINES SERPL QL SCN: NEGATIVE NG/ML
BARBITURATES SERPL QL SCN: NEGATIVE UG/ML
BENZODIAZ SERPL QL SCN: NEGATIVE NG/ML
CANNABINOIDS SERPL QL SCN: NEGATIVE NG/ML
COCAINE+BZE SERPL QL SCN: NEGATIVE NG/ML
ETHANOL SERPL-MCNC: NEGATIVE GM/DL
GABAPENTIN SERPLBLD-MCNC: 6.2 UG/ML (ref 4–16)
METHADONE SERPL QL SCN: NEGATIVE NG/ML
OPIATES SERPL QL SCN: NEGATIVE NG/ML
OXYCODONE+OXYMORPHONE SERPLBLD QL SCN: NEGATIVE NG/ML
PCP SERPL QL SCN: NEGATIVE NG/ML
PROPOXYPH SERPL QL SCN: NEGATIVE NG/ML

## 2020-12-08 DIAGNOSIS — G89.4 CHRONIC PAIN SYNDROME: ICD-10-CM

## 2020-12-08 RX ORDER — GABAPENTIN 600 MG/1
600 TABLET ORAL 4 TIMES DAILY
Qty: 120 TABLET | Refills: 5 | Status: SHIPPED | OUTPATIENT
Start: 2020-12-08 | End: 2021-05-28 | Stop reason: SDUPTHER

## 2020-12-15 ENCOUNTER — HOSPITAL ENCOUNTER (OUTPATIENT)
Dept: CT IMAGING | Facility: HOSPITAL | Age: 50
Discharge: HOME | End: 2020-12-15
Admitting: NURSE PRACTITIONER

## 2020-12-15 PROCEDURE — 70470 CT HEAD/BRAIN W/O & W/DYE: CPT

## 2020-12-15 PROCEDURE — 0 IOPAMIDOL PER 1 ML: Performed by: NURSE PRACTITIONER

## 2020-12-15 RX ADMIN — IOPAMIDOL 50 ML: 755 INJECTION, SOLUTION INTRAVENOUS at 10:22

## 2020-12-16 NOTE — PROGRESS NOTES
Please let patient know her CT scan of the head did not show any abnormalities.  As we discussed CT is not the best to look for demyelinating lesions such as MS.  She did tell me that she could not have an MRI, I think she has a pain pump?  Can she make sure that this is not MRI compatible, some are in similar not.  Definitively if we are not able to get MRI of the brain the only other way to look for MS would be a lumbar puncture, which she like to move forward with this.

## 2020-12-17 ENCOUNTER — TELEPHONE (OUTPATIENT)
Dept: NEUROLOGY | Facility: CLINIC | Age: 50
End: 2020-12-17

## 2020-12-17 NOTE — TELEPHONE ENCOUNTER
----- Message from MILENA Grant sent at 12/16/2020  4:41 PM EST -----  Please let patient know her CT scan of the head did not show any abnormalities.  As we discussed CT is not the best to look for demyelinating lesions such as MS.  She did tell me that she could not have an MRI, I think she has a pain pump?  Can she make sure that this is not MRI compatible, some are in similar not.  Definitively if we are not able to get MRI of the brain the only other way to look for MS would be a lumbar puncture, which she like to move forward with this.

## 2021-01-05 ENCOUNTER — TRANSCRIBE ORDERS (OUTPATIENT)
Dept: ADMINISTRATIVE | Facility: HOSPITAL | Age: 51
End: 2021-01-05

## 2021-01-05 DIAGNOSIS — F51.04 CHRONIC INSOMNIA: ICD-10-CM

## 2021-01-05 DIAGNOSIS — Z51.81 MEDICATION MONITORING ENCOUNTER: ICD-10-CM

## 2021-01-05 DIAGNOSIS — M54.2 NECK PAIN: Primary | ICD-10-CM

## 2021-01-05 RX ORDER — NORTRIPTYLINE HYDROCHLORIDE 25 MG/1
50 CAPSULE ORAL NIGHTLY
Qty: 60 CAPSULE | Refills: 5 | Status: SHIPPED | OUTPATIENT
Start: 2021-01-05 | End: 2021-05-24 | Stop reason: SDUPTHER

## 2021-01-06 ENCOUNTER — HOSPITAL ENCOUNTER (OUTPATIENT)
Dept: ULTRASOUND IMAGING | Facility: HOSPITAL | Age: 51
Discharge: HOME | End: 2021-01-06
Admitting: ANESTHESIOLOGY

## 2021-01-06 DIAGNOSIS — M54.2 NECK PAIN: ICD-10-CM

## 2021-01-06 PROCEDURE — 76536 US EXAM OF HEAD AND NECK: CPT

## 2021-01-25 ENCOUNTER — OFFICE VISIT (OUTPATIENT)
Dept: INTERNAL MEDICINE | Facility: CLINIC | Age: 51
End: 2021-01-25

## 2021-01-25 VITALS
OXYGEN SATURATION: 98 % | HEART RATE: 75 BPM | DIASTOLIC BLOOD PRESSURE: 60 MMHG | RESPIRATION RATE: 18 BRPM | SYSTOLIC BLOOD PRESSURE: 100 MMHG | BODY MASS INDEX: 26.82 KG/M2 | HEIGHT: 63 IN | WEIGHT: 151.38 LBS | TEMPERATURE: 98.7 F

## 2021-01-25 DIAGNOSIS — I10 ESSENTIAL HYPERTENSION: Primary | ICD-10-CM

## 2021-01-25 DIAGNOSIS — I48.0 PAROXYSMAL ATRIAL FIBRILLATION (HCC): ICD-10-CM

## 2021-01-25 DIAGNOSIS — N39.0 COMPLICATED UTI (URINARY TRACT INFECTION): ICD-10-CM

## 2021-01-25 DIAGNOSIS — R39.15 URINARY URGENCY: ICD-10-CM

## 2021-01-25 DIAGNOSIS — K21.9 GASTROESOPHAGEAL REFLUX DISEASE, UNSPECIFIED WHETHER ESOPHAGITIS PRESENT: ICD-10-CM

## 2021-01-25 DIAGNOSIS — G89.4 CHRONIC PAIN SYNDROME: ICD-10-CM

## 2021-01-25 LAB
BILIRUB BLD-MCNC: NEGATIVE MG/DL
CLARITY, POC: CLEAR
COLOR UR: YELLOW
GLUCOSE UR STRIP-MCNC: NEGATIVE MG/DL
KETONES UR QL: NEGATIVE
LEUKOCYTE EST, POC: NEGATIVE
NITRITE UR-MCNC: POSITIVE MG/ML
PH UR: 6 [PH] (ref 5–8)
PROT UR STRIP-MCNC: NEGATIVE MG/DL
RBC # UR STRIP: NEGATIVE /UL
SP GR UR: 1.01 (ref 1–1.03)
UROBILINOGEN UR QL: NORMAL

## 2021-01-25 PROCEDURE — 81003 URINALYSIS AUTO W/O SCOPE: CPT | Performed by: FAMILY MEDICINE

## 2021-01-25 PROCEDURE — 99214 OFFICE O/P EST MOD 30 MIN: CPT | Performed by: FAMILY MEDICINE

## 2021-01-25 RX ORDER — NITROFURANTOIN 25; 75 MG/1; MG/1
100 CAPSULE ORAL EVERY 12 HOURS SCHEDULED
Qty: 14 CAPSULE | Refills: 0 | Status: SHIPPED | OUTPATIENT
Start: 2021-01-25 | End: 2021-02-01

## 2021-01-25 RX ORDER — DEXLANSOPRAZOLE 60 MG/1
60 CAPSULE, DELAYED RELEASE ORAL DAILY
Qty: 90 CAPSULE | Refills: 3 | Status: SHIPPED | OUTPATIENT
Start: 2021-01-25

## 2021-01-25 NOTE — PROGRESS NOTES
"Subjective    Nely Castrejon is a 50 y.o. female here for:  Chief Complaint   Patient presents with   • Hypertension     Better. Goes back to cardiology in February.    • Heartburn     Somedays are better than others. Continues Rabeprazole daily. Does not eat spicy foods or chocolate.        History per MA reviewed.    Having issues with urinary incontinence  Some days constantly going  Some days has trouble going  Has urgency, can't make it to bathroom on time  Has had for couple of months         The following portions of the patient's history were reviewed and updated as appropriate: allergies, current medications, past family history, past medical history, past social history, past surgical history and problem list.    Review of Systems   Constitutional: Negative for fever.   Genitourinary: Positive for frequency, urgency and urinary incontinence.   Musculoskeletal: Positive for arthralgias and neck pain.       Visit Vitals  /60   Pulse 75   Temp 98.7 °F (37.1 °C) (Temporal)   Resp 18   Ht 160 cm (62.99\")   Wt 68.7 kg (151 lb 6 oz)   SpO2 98%   BMI 26.82 kg/m²         Objective   Physical Exam  Vitals signs and nursing note reviewed.   Constitutional:       General: She is not in acute distress.     Appearance: Normal appearance. She is well-developed and well-groomed. She is not ill-appearing, toxic-appearing or diaphoretic.      Interventions: Face mask in place.   HENT:      Head: Normocephalic and atraumatic.      Right Ear: Hearing normal.      Left Ear: Hearing normal.   Eyes:      General: Lids are normal. No scleral icterus.     Extraocular Movements: Extraocular movements intact.   Neck:      Musculoskeletal: Neck supple.      Trachea: Phonation normal.   Cardiovascular:      Rate and Rhythm: Normal rate and regular rhythm.   Pulmonary:      Effort: Pulmonary effort is normal.   Lymphadenopathy:      Upper Body:      Left upper body: No supraclavicular adenopathy.   Skin:     Coloration: Skin is " not jaundiced.   Neurological:      General: No focal deficit present.      Mental Status: She is alert and oriented to person, place, and time.      Motor: Motor function is intact.   Psychiatric:         Attention and Perception: Attention and perception normal.         Mood and Affect: Mood and affect normal.         Speech: Speech normal.         Behavior: Behavior normal. Behavior is cooperative.         Thought Content: Thought content normal.         Cognition and Memory: Cognition and memory normal.         Judgment: Judgment normal.         For medical decision making review of the following was required:  Office Visit on 01/25/2021   Component Date Value Ref Range Status   • Color 01/25/2021 Yellow  Yellow, Straw, Dark Yellow, Mariah Final   • Clarity, UA 01/25/2021 Clear  Clear Final   • Specific Gravity  01/25/2021 1.015  1.005 - 1.030 Final   • pH, Urine 01/25/2021 6.0  5.0 - 8.0 Final   • Leukocytes 01/25/2021 Negative  Negative Final   • Nitrite, UA 01/25/2021 Positive* Negative Final   • Protein, POC 01/25/2021 Negative  Negative mg/dL Final   • Glucose, UA 01/25/2021 Negative  Negative, 1000 mg/dL (3+) mg/dL Final   • Ketones, UA 01/25/2021 Negative  Negative Final   • Urobilinogen, UA 01/25/2021 Normal  Normal Final   • Bilirubin 01/25/2021 Negative  Negative Final   • Blood, UA 01/25/2021 Negative  Negative Final         Assessment/Plan     Problem List Items Addressed This Visit        Cardiac and Vasculature    Essential hypertension - Primary    Current Assessment & Plan     Hypertension is improving with treatment.  Continue current treatment regimen.  Blood pressure will be reassessed at the next regular appointment.         Paroxysmal atrial fibrillation (CMS/HCC)    Current Assessment & Plan     Follow up with cardiology next week, continue anticoagulation.            Gastrointestinal Abdominal     GERD (gastroesophageal reflux disease)    Current Assessment & Plan     Trial Dexilant as  aciphex not helping.         Relevant Medications    dexlansoprazole (Dexilant) 60 MG capsule       Neuro    Chronic pain syndrome    Current Assessment & Plan     Follow up with pain management           Other Visit Diagnoses     Urinary urgency        if doesn't resolve wtih antibiotic may try myrbetriq 25 mg.    Relevant Orders    POC Urinalysis Dipstick, Automated (Completed)    Complicated UTI (urinary tract infection)        Relevant Medications    nitrofurantoin, macrocrystal-monohydrate, (Macrobid) 100 MG capsule    Other Relevant Orders    Urine Culture - , Urine, Clean Catch            Return in about 6 months (around 7/25/2021) for Blood Pressure follow up.  ·      Jie Mayo MD

## 2021-01-27 DIAGNOSIS — N39.0 COMPLICATED UTI (URINARY TRACT INFECTION): Primary | ICD-10-CM

## 2021-01-27 LAB
BACTERIA UR CULT: ABNORMAL
BACTERIA UR CULT: ABNORMAL
OTHER ANTIBIOTIC SUSC ISLT: ABNORMAL

## 2021-01-27 RX ORDER — CEFUROXIME AXETIL 500 MG/1
500 TABLET ORAL 2 TIMES DAILY
Qty: 14 TABLET | Refills: 0 | Status: SHIPPED | OUTPATIENT
Start: 2021-01-27 | End: 2021-02-03

## 2021-03-31 ENCOUNTER — OFFICE VISIT (OUTPATIENT)
Dept: INTERNAL MEDICINE | Facility: CLINIC | Age: 51
End: 2021-03-31

## 2021-03-31 ENCOUNTER — TELEPHONE (OUTPATIENT)
Dept: INTERNAL MEDICINE | Facility: CLINIC | Age: 51
End: 2021-03-31

## 2021-03-31 ENCOUNTER — HOSPITAL ENCOUNTER (OUTPATIENT)
Dept: GENERAL RADIOLOGY | Facility: HOSPITAL | Age: 51
Discharge: HOME | End: 2021-03-31
Admitting: NURSE PRACTITIONER

## 2021-03-31 VITALS
HEIGHT: 63 IN | OXYGEN SATURATION: 99 % | HEART RATE: 76 BPM | TEMPERATURE: 98.2 F | WEIGHT: 150 LBS | DIASTOLIC BLOOD PRESSURE: 82 MMHG | BODY MASS INDEX: 26.58 KG/M2 | SYSTOLIC BLOOD PRESSURE: 120 MMHG

## 2021-03-31 DIAGNOSIS — N39.46 MIXED STRESS AND URGE URINARY INCONTINENCE: ICD-10-CM

## 2021-03-31 DIAGNOSIS — M54.9 MID BACK PAIN: ICD-10-CM

## 2021-03-31 DIAGNOSIS — R35.0 URINARY FREQUENCY: ICD-10-CM

## 2021-03-31 DIAGNOSIS — R73.9 HYPERGLYCEMIA: ICD-10-CM

## 2021-03-31 DIAGNOSIS — R53.83 FATIGUE, UNSPECIFIED TYPE: ICD-10-CM

## 2021-03-31 DIAGNOSIS — Z12.31 ENCOUNTER FOR SCREENING MAMMOGRAM FOR MALIGNANT NEOPLASM OF BREAST: ICD-10-CM

## 2021-03-31 DIAGNOSIS — R39.9 URINARY SYMPTOM OR SIGN: Primary | ICD-10-CM

## 2021-03-31 LAB
BILIRUB BLD-MCNC: NEGATIVE MG/DL
CLARITY, POC: CLEAR
COLOR UR: YELLOW
GLUCOSE UR STRIP-MCNC: NEGATIVE MG/DL
KETONES UR QL: NEGATIVE
LEUKOCYTE EST, POC: NEGATIVE
NITRITE UR-MCNC: NEGATIVE MG/ML
PH UR: 6 [PH] (ref 5–8)
PROT UR STRIP-MCNC: NEGATIVE MG/DL
RBC # UR STRIP: NEGATIVE /UL
SP GR UR: 1.02 (ref 1–1.03)
UROBILINOGEN UR QL: NORMAL

## 2021-03-31 PROCEDURE — 99214 OFFICE O/P EST MOD 30 MIN: CPT | Performed by: NURSE PRACTITIONER

## 2021-03-31 PROCEDURE — 81003 URINALYSIS AUTO W/O SCOPE: CPT | Performed by: NURSE PRACTITIONER

## 2021-03-31 PROCEDURE — 72072 X-RAY EXAM THORAC SPINE 3VWS: CPT

## 2021-04-01 LAB
ALBUMIN SERPL-MCNC: 4.7 G/DL (ref 3.5–5.2)
ALBUMIN/GLOB SERPL: 2.2 G/DL
ALP SERPL-CCNC: 90 U/L (ref 39–117)
ALT SERPL-CCNC: 20 U/L (ref 1–33)
AST SERPL-CCNC: 16 U/L (ref 1–32)
BASOPHILS # BLD AUTO: 0.04 10*3/MM3 (ref 0–0.2)
BASOPHILS NFR BLD AUTO: 0.5 % (ref 0–1.5)
BILIRUB SERPL-MCNC: 0.3 MG/DL (ref 0–1.2)
BUN SERPL-MCNC: 16 MG/DL (ref 6–20)
BUN/CREAT SERPL: 18.6 (ref 7–25)
CALCIUM SERPL-MCNC: 9.4 MG/DL (ref 8.6–10.5)
CHLORIDE SERPL-SCNC: 100 MMOL/L (ref 98–107)
CO2 SERPL-SCNC: 31.2 MMOL/L (ref 22–29)
CREAT SERPL-MCNC: 0.86 MG/DL (ref 0.57–1)
EOSINOPHIL # BLD AUTO: 0.01 10*3/MM3 (ref 0–0.4)
EOSINOPHIL NFR BLD AUTO: 0.1 % (ref 0.3–6.2)
ERYTHROCYTE [DISTWIDTH] IN BLOOD BY AUTOMATED COUNT: 12 % (ref 12.3–15.4)
GLOBULIN SER CALC-MCNC: 2.1 GM/DL
GLUCOSE SERPL-MCNC: 95 MG/DL (ref 65–99)
HBA1C MFR BLD: 5.6 % (ref 4.8–5.6)
HCT VFR BLD AUTO: 39.9 % (ref 34–46.6)
HGB BLD-MCNC: 13.8 G/DL (ref 12–15.9)
IMM GRANULOCYTES # BLD AUTO: 0.03 10*3/MM3 (ref 0–0.05)
IMM GRANULOCYTES NFR BLD AUTO: 0.3 % (ref 0–0.5)
LYMPHOCYTES # BLD AUTO: 2.15 10*3/MM3 (ref 0.7–3.1)
LYMPHOCYTES NFR BLD AUTO: 25 % (ref 19.6–45.3)
MCH RBC QN AUTO: 30.3 PG (ref 26.6–33)
MCHC RBC AUTO-ENTMCNC: 34.6 G/DL (ref 31.5–35.7)
MCV RBC AUTO: 87.5 FL (ref 79–97)
MONOCYTES # BLD AUTO: 0.63 10*3/MM3 (ref 0.1–0.9)
MONOCYTES NFR BLD AUTO: 7.3 % (ref 5–12)
NEUTROPHILS # BLD AUTO: 5.73 10*3/MM3 (ref 1.7–7)
NEUTROPHILS NFR BLD AUTO: 66.8 % (ref 42.7–76)
NRBC BLD AUTO-RTO: 0 /100 WBC (ref 0–0.2)
PLATELET # BLD AUTO: 203 10*3/MM3 (ref 140–450)
POTASSIUM SERPL-SCNC: 3.5 MMOL/L (ref 3.5–5.2)
PROT SERPL-MCNC: 6.8 G/DL (ref 6–8.5)
RBC # BLD AUTO: 4.56 10*6/MM3 (ref 3.77–5.28)
SODIUM SERPL-SCNC: 140 MMOL/L (ref 136–145)
WBC # BLD AUTO: 8.59 10*3/MM3 (ref 3.4–10.8)

## 2021-04-05 ENCOUNTER — TELEPHONE (OUTPATIENT)
Dept: INTERNAL MEDICINE | Facility: CLINIC | Age: 51
End: 2021-04-05

## 2021-04-05 NOTE — TELEPHONE ENCOUNTER
Caller: Nely Castrejon    Relationship: Self    Best call back number: [phone]    What is the best time to reach you: ANYTIME    Who are you requesting to speak with (clinical staff, provider,  specific staff member): CLINICAL    Do you know the name of the person who called:     What was the call regarding: PT CALLED REQUESTING LAB RESULTS    Do you require a callback: YES

## 2021-04-21 ENCOUNTER — OFFICE VISIT (OUTPATIENT)
Dept: CARDIOLOGY | Facility: CLINIC | Age: 51
End: 2021-04-21

## 2021-04-21 VITALS
HEART RATE: 86 BPM | BODY MASS INDEX: 26.58 KG/M2 | HEIGHT: 63 IN | SYSTOLIC BLOOD PRESSURE: 106 MMHG | DIASTOLIC BLOOD PRESSURE: 68 MMHG | OXYGEN SATURATION: 98 % | RESPIRATION RATE: 16 BRPM | WEIGHT: 150 LBS

## 2021-04-21 DIAGNOSIS — Z95.0 CARDIAC PACEMAKER IN SITU: ICD-10-CM

## 2021-04-21 DIAGNOSIS — Z01.810 PRE-OPERATIVE CARDIOVASCULAR EXAMINATION: ICD-10-CM

## 2021-04-21 DIAGNOSIS — I48.0 PAROXYSMAL ATRIAL FIBRILLATION (HCC): Primary | ICD-10-CM

## 2021-04-21 DIAGNOSIS — I10 ESSENTIAL HYPERTENSION: ICD-10-CM

## 2021-04-21 DIAGNOSIS — Z86.79 HISTORY OF SICK SINUS SYNDROME: ICD-10-CM

## 2021-04-21 PROCEDURE — 99214 OFFICE O/P EST MOD 30 MIN: CPT | Performed by: INTERNAL MEDICINE

## 2021-04-21 PROCEDURE — 93280 PM DEVICE PROGR EVAL DUAL: CPT | Performed by: INTERNAL MEDICINE

## 2021-04-21 RX ORDER — RABEPRAZOLE SODIUM 20 MG/1
20 TABLET, DELAYED RELEASE ORAL DAILY
COMMUNITY
Start: 2021-03-10 | End: 2021-05-24 | Stop reason: ALTCHOICE

## 2021-04-21 NOTE — PROGRESS NOTES
Subjective:     Encounter Date:04/21/2021      Patient ID: Nely Castrejon is a 51 y.o. female.    Chief Complaint: Atrial fibrillation  HPI  This is a 51-year-old female patient who presents to cardiology clinic for routine follow-up.  The patient has a [employer] dual-chamber rate responsive pacemaker-75 bpm.  The patient is paced 70% of the time the right atrium and less than 1% of the time the right ventricle.  Right atrial and right ventricular lead interrogation shows normal function based on P/R wave sensing, pacing threshold and lead impedance.  She has an estimated 4-1/2 years of generator longevity.  Basic underlying rhythm is sinus rhythm at 66 bpm.  The patient has had 14 mode switches.  Her overall mode switch burden is less than 1%.  The patient has had her RV sensing function changed to bipolar from unipolar and impedance alert has been decreased to 100 ohms.  The patient indicates that she is planning to have a myelogram.  It has been requested that her Xarelto be held for 3 days prior to the neuro axial procedure.  She has had no signs or symptoms to suggest stroke, TIA or other cardioembolic event.  She has had no bleeding complications related to lifelong anticoagulation therapy.  The following portions of the patient's history were reviewed and updated as appropriate: allergies, current medications, past family history, past medical history, past social history, past surgical history and problem  Review of Systems   Constitutional: Negative for chills, diaphoresis, fever, malaise/fatigue, weight gain and weight loss.   HENT: Negative for ear discharge, hearing loss, hoarse voice and nosebleeds.    Eyes: Negative for discharge, double vision, pain and photophobia.   Cardiovascular: Negative for chest pain, claudication, cyanosis, dyspnea on exertion, irregular heartbeat, leg swelling, near-syncope, orthopnea, palpitations, paroxysmal nocturnal dyspnea and syncope.   Respiratory:  Negative for cough, hemoptysis, shortness of breath, sputum production and wheezing.    Endocrine: Negative for cold intolerance, heat intolerance, polydipsia, polyphagia and polyuria.   Hematologic/Lymphatic: Negative for adenopathy and bleeding problem. Does not bruise/bleed easily.   Skin: Negative for color change, flushing, itching and rash.   Musculoskeletal: Negative for muscle cramps, muscle weakness, myalgias and stiffness.   Gastrointestinal: Negative for abdominal pain, diarrhea, hematemesis, hematochezia, nausea and vomiting.   Genitourinary: Negative for dysuria, frequency and nocturia.   Neurological: Negative for focal weakness, loss of balance, numbness, paresthesias and seizures.   Psychiatric/Behavioral: Negative for altered mental status, hallucinations and suicidal ideas.   Allergic/Immunologic: Negative for HIV exposure, hives and persistent infections.           Current Outpatient Medications:   •  baclofen (LIORESAL) 10 MG tablet, Take 1 tablet by mouth 3 (Three) Times a Day As Needed for Muscle Spasms., Disp: 270 tablet, Rfl: 3  •  Cholecalciferol (VITAMIN D3) 125 MCG (5000 UT) capsule capsule, Take 5,000 Units by mouth Daily., Disp: , Rfl:   •  Cyanocobalamin (B-12) 1000 MCG capsule, take 1 capsule by mouth daily, Disp: 60 capsule, Rfl: 6  •  dexlansoprazole (Dexilant) 60 MG capsule, Take 1 capsule by mouth Daily. Indications: Gastroesophageal Reflux Disease, Disp: 90 capsule, Rfl: 3  •  gabapentin (NEURONTIN) 600 MG tablet, Take 1 tablet by mouth 4 (Four) Times a Day., Disp: 120 tablet, Rfl: 5  •  irbesartan (Avapro) 75 MG tablet, Take 1 tablet by mouth Daily., Disp: 30 tablet, Rfl: 11  •  meloxicam (MOBIC) 7.5 MG tablet, Take 7.5 mg by mouth Daily., Disp: , Rfl:   •  metoprolol succinate XL (TOPROL-XL) 100 MG 24 hr tablet, Take 1 tablet by mouth Daily., Disp: , Rfl: 0  •  nortriptyline (PAMELOR) 25 MG capsule, TAKE 2 CAPSULES BY MOUTH EVERY NIGHT, Disp: 60 capsule, Rfl: 5  •  RABEprazole  "(ACIPHEX) 20 MG EC tablet, Take 20 mg by mouth Daily., Disp: , Rfl:   •  rivaroxaban (Xarelto) 20 MG tablet, Take 1 tablet by mouth Daily With Dinner., Disp: 30 tablet, Rfl: 11    Objective:   Vitals and nursing note reviewed.   Constitutional:       Appearance: Healthy appearance. Not in distress.   Neck:      Vascular: No JVR. JVD normal.   Pulmonary:      Effort: Pulmonary effort is normal.      Breath sounds: Normal breath sounds. No wheezing. No rhonchi. No rales.   Chest:      Chest wall: Not tender to palpatation.   Cardiovascular:      PMI at left midclavicular line. Normal rate. Regular rhythm. Normal S1. Normal S2.      Murmurs: There is no murmur.      No gallop. No click. No rub.   Pulses:     Intact distal pulses.   Edema:     Peripheral edema absent.   Abdominal:      General: Bowel sounds are normal.      Palpations: Abdomen is soft.      Tenderness: There is no abdominal tenderness.   Musculoskeletal: Normal range of motion.         General: No tenderness. Skin:     General: Skin is warm and dry.   Neurological:      General: No focal deficit present.      Mental Status: Alert and oriented to person, place and time.       Blood pressure 106/68, pulse 86, resp. rate 16, height 160 cm (63\"), weight 68 kg (150 lb), SpO2 98 %.   Lab Review:     Assessment:       1. Paroxysmal atrial fibrillation (CMS/HCC)  Asymptomatic.  Rate control and anticoagulation strategy.  Less than 1% atrial fibrillation burden.    2. History of sick sinus syndrome  Normal pacemaker function.      3. Essential hypertension  Acceptable blood pressure control.    4. Pre-operative cardiovascular examination  The patient may safely undergo myelogram with interruption of direct oral anticoagulation therapy for 3 days.  Anticoagulation therapy should be restarted the following day with achieved hemostasis.    Procedures    Plan:     Changes to her medications have been made at today's visit.  No additional cardiovascular testing is " warranted at this time.  The patient may undergo her planned myelogram as described above from a cardiovascular perspective.

## 2021-05-07 ENCOUNTER — HOSPITAL ENCOUNTER (OUTPATIENT)
Dept: MAMMOGRAPHY | Facility: HOSPITAL | Age: 51
Discharge: HOME | End: 2021-05-07
Admitting: NURSE PRACTITIONER

## 2021-05-07 PROCEDURE — 77063 BREAST TOMOSYNTHESIS BI: CPT

## 2021-05-07 PROCEDURE — 77067 SCR MAMMO BI INCL CAD: CPT

## 2021-05-24 ENCOUNTER — LAB (OUTPATIENT)
Dept: LAB | Facility: HOSPITAL | Age: 51
End: 2021-05-24

## 2021-05-24 ENCOUNTER — OFFICE VISIT (OUTPATIENT)
Dept: NEUROLOGY | Facility: CLINIC | Age: 51
End: 2021-05-24

## 2021-05-24 VITALS
DIASTOLIC BLOOD PRESSURE: 80 MMHG | BODY MASS INDEX: 26.75 KG/M2 | TEMPERATURE: 96.9 F | SYSTOLIC BLOOD PRESSURE: 120 MMHG | OXYGEN SATURATION: 99 % | WEIGHT: 151 LBS | HEIGHT: 63 IN | HEART RATE: 67 BPM

## 2021-05-24 DIAGNOSIS — Z51.81 MEDICATION MONITORING ENCOUNTER: ICD-10-CM

## 2021-05-24 DIAGNOSIS — R52 ELECTRIC SHOCK-TYPE PAIN: Primary | ICD-10-CM

## 2021-05-24 DIAGNOSIS — R20.2 PARESTHESIA: ICD-10-CM

## 2021-05-24 DIAGNOSIS — F51.04 CHRONIC INSOMNIA: ICD-10-CM

## 2021-05-24 PROCEDURE — 80171 DRUG SCREEN QUANT GABAPENTIN: CPT

## 2021-05-24 PROCEDURE — 80307 DRUG TEST PRSMV CHEM ANLYZR: CPT

## 2021-05-24 PROCEDURE — 36415 COLL VENOUS BLD VENIPUNCTURE: CPT

## 2021-05-24 PROCEDURE — 99213 OFFICE O/P EST LOW 20 MIN: CPT | Performed by: NURSE PRACTITIONER

## 2021-05-24 RX ORDER — NORTRIPTYLINE HYDROCHLORIDE 25 MG/1
50 CAPSULE ORAL NIGHTLY
Qty: 60 CAPSULE | Refills: 5 | Status: SHIPPED | OUTPATIENT
Start: 2021-05-24 | End: 2022-10-05

## 2021-05-24 NOTE — PROGRESS NOTES
"Subjective:     Patient ID: Nely Castrejon is a 51 y.o. female.    CC:   Chief Complaint   Patient presents with   • Med Management       HPI:   History of Present Illness      Ms. Castrejon is here today for follow-up and refills on her gabapentin.      She is a long-term patient of Dr. Cortez followed for many years for several complaints including chronic pain, neck and back pain and episodic periods of disorientation that she has had after several concussions years ago.  She is had a great deal of work-up including EEG that she did not show for.      She is here today just for her regular follow-up and refills on her gabapentin, she is currently taking 600 mg 4 times a day.  She previously was in pain management although she missed a pill count and was apparently dismissed, this was years ago.    At last visit she has not a refer her back to Dr. Sims, she tells me today that she has seen them, the had ordered a myelogram of her back but more requiring her cardiologist to give him the okay to stop her blood thinner, she tells me that they have been unable to contact her cardiologist.  After last visit she was complaining of \"electrical sensation\" that have been ongoing for years, she asked that I work her up for MS.  We did a CT scan of her head which did not show any demyelinating plaques although this is not the recommended test for MS surveillance.  Patient has a pacemaker and cannot have MRI.  We were unable to get in touch with her after her CT scan, had recommended LP she was interested.  We had to mail her results letter never heard back from her.  Today she tells me she is not really interested in LP.  She continues to have neck pain and burning tingling pain in her arms.    She has occasional urinary incontinence, mostly sounds like stress incontinence.  She denies falls or weakness in her legs.  She denies double vision  She has no other new complaints today      The following portions of the " "patient's history were reviewed and updated as appropriate: allergies, current medications, past family history, past medical history, past social history, past surgical history and problem list.    Past Medical History:   Diagnosis Date   • Arthritis    • Fibromyalgia, primary    • GERD (gastroesophageal reflux disease)    • History of osteoarthritis 8/1/2016   • Hypertension    • Primary stabbing headache    • Sick sinus syndrome (CMS/HCC)    • Vitamin B12 deficiency        Past Surgical History:   Procedure Laterality Date   • CHOLECYSTECTOMY     • OOPHORECTOMY     • PACEMAKER IMPLANTATION     • SHOULDER SURGERY      left   • SUBTOTAL HYSTERECTOMY      Partial        Social History     Socioeconomic History   • Marital status:      Spouse name: Not on file   • Number of children: Not on file   • Years of education: Not on file   • Highest education level: Not on file   Tobacco Use   • Smoking status: Never Smoker   • Smokeless tobacco: Never Used   Vaping Use   • Vaping Use: Never used   Substance and Sexual Activity   • Alcohol use: Never   • Drug use: Never   • Sexual activity: Defer       Family History   Problem Relation Age of Onset   • Arthritis Maternal Grandmother    • Hypertension Maternal Grandmother    • Kidney disease Maternal Grandmother    • Breast cancer Neg Hx         Review of Systems   Constitutional: Negative.    HENT: Negative.    Eyes: Negative.    Respiratory: Negative.    Cardiovascular: Negative.    Gastrointestinal: Negative.    Endocrine: Negative.    Genitourinary: Negative.    Musculoskeletal: Positive for arthralgias, back pain and neck pain. Negative for gait problem, joint swelling and myalgias.        Chronic back and neck pain   Skin: Negative.    Allergic/Immunologic: Negative.    Neurological: Negative.    Hematological: Negative.    Psychiatric/Behavioral: Negative.         Objective:  /80   Pulse 67   Temp 96.9 °F (36.1 °C)   Ht 160 cm (63\")   Wt 68.5 kg (151 " lb)   SpO2 99%   BMI 26.75 kg/m²     Neurologic Exam     Mental Status   Oriented to person, place, and time.   Speech: speech is normal   Level of consciousness: alert    Cranial Nerves   Cranial nerves II through XII intact.     CN III, IV, VI   Pupils are equal, round, and reactive to light.    Motor Exam   Muscle bulk: normal  Overall muscle tone: normal  Right arm pronator drift: absent  Left arm pronator drift: absent    Strength   Strength 5/5 throughout.     Gait, Coordination, and Reflexes     Gait  Gait: normal    Coordination   Romberg: negative  Finger to nose coordination: normal  Heel to shin coordination: normal  Tandem walking coordination: normal    Tremor   Resting tremor: absent  Intention tremor: absent  Action tremor: absent    Reflexes   Reflexes 2+ except as noted.       Physical Exam  Vitals reviewed.   Constitutional:       General: She is not in acute distress.     Appearance: She is well-developed. She is not ill-appearing or toxic-appearing.   HENT:      Head: Normocephalic and atraumatic.      Mouth/Throat:      Mouth: Mucous membranes are moist.   Eyes:      General: No scleral icterus.     Conjunctiva/sclera: Conjunctivae normal.      Pupils: Pupils are equal, round, and reactive to light.   Pulmonary:      Effort: Pulmonary effort is normal. No respiratory distress.   Musculoskeletal:      Cervical back: Normal range of motion and neck supple.   Skin:     General: Skin is warm.      Capillary Refill: Capillary refill takes less than 2 seconds.   Neurological:      Mental Status: She is alert and oriented to person, place, and time.      Coordination: Finger-Nose-Finger Test, Heel to Shin Test and Romberg Test normal.      Gait: Gait is intact. Tandem walk normal.      Deep Tendon Reflexes: Strength normal.   Psychiatric:         Mood and Affect: Mood normal.         Speech: Speech normal.         Behavior: Behavior normal.         Thought Content: Thought content normal.          Judgment: Judgment normal.         Assessment/Plan:       Diagnoses and all orders for this visit:    1. Electric shock-type pain (Primary)  -     CT cervical spine w wo contrast; Future    2. Paresthesia  -     CT cervical spine w wo contrast; Future    3. Chronic insomnia  -     nortriptyline (PAMELOR) 25 MG capsule; Take 2 capsules by mouth Every Night.  Dispense: 60 capsule; Refill: 5    4. Medication monitoring encounter  -     Drug Screen 11 w/Conf, Serum; Future  -     Gabapentin Level; Future  -     nortriptyline (PAMELOR) 25 MG capsule; Take 2 capsules by mouth Every Night.  Dispense: 60 capsule; Refill: 5    CT of the neck ordered, patient is now on pain management, they are working on getting a myelogram of her lower back.  She is requesting refills on her gabapentin, she picked this up 5/17/2021.  Drug screen gabapentin level pending, will send refills when labs are reviewed.  Controlled substance agreement has been updated today.  Refills given on her nortriptyline as well.  She will continue follow-up with pain management  As part of this patient's treatment plan I am prescribing controlled substances. The patient has been made aware of appropriate use of such medications, including potential risk of somnolence, limited ability to drive and/or work safely, and potential for dependence or overdose. It has also been made clear that these medications are for use by the patient only, without concomitant use of alcohol or other substances unless prescribed. Keep out of reach of children.  Adin report has been reviewed. If this is going to be prescribed long term, [facility] [place]rolled Sub[place] Agreement Contract has also been read and signed by patient and myself.  Return to clinic 6 months or sooner if needed         Reviewed medications, potential side effects and signs and symptoms to report. Discussed risk versus benefits of treatment plan with patient and/or family-including medications, labs and  radiology that may be ordered. Addressed questions and concerns during visit. Patient and/or family verbalized understanding and agree with plan.    AS THE PROVIDER, I PERSONALLY WORE PPE DURING ENTIRE FACE TO FACE ENCOUNTER IN CLINIC WITH THE PATIENT. PATIENT ALSO WORE PPE DURING ENTIRE FACE TO FACE ENCOUNTER EXCEPT FOR A MAX OF 30 SECONDS DURING NEUROLOGICAL EVALUATION OF CRANIAL NERVES AND THEN MASK WAS PLACED BACK OVER PATIENT FACE FOR REMAINDER OF VISIT. I WASHED MY HANDS BEFORE AND AFTER VISIT.      Adeola Patricio, APRN  5/24/2021

## 2021-05-27 LAB
AMPHETAMINES SERPL QL SCN: NEGATIVE NG/ML
BARBITURATES SERPL QL SCN: NEGATIVE UG/ML
BENZODIAZ SERPL QL SCN: NEGATIVE NG/ML
CANNABINOIDS SERPL QL SCN: NEGATIVE NG/ML
COCAINE+BZE SERPL QL SCN: NEGATIVE NG/ML
ETHANOL SERPL-MCNC: NEGATIVE GM/DL
GABAPENTIN SERPLBLD-MCNC: 5.9 UG/ML (ref 4–16)
METHADONE SERPL QL SCN: NEGATIVE NG/ML
OPIATES SERPL QL SCN: NEGATIVE NG/ML
OXYCODONE+OXYMORPHONE SERPLBLD QL SCN: NEGATIVE NG/ML
PCP SERPL QL SCN: NEGATIVE NG/ML
PROPOXYPH SERPL QL SCN: NEGATIVE NG/ML

## 2021-05-28 DIAGNOSIS — G89.4 CHRONIC PAIN SYNDROME: ICD-10-CM

## 2021-05-28 RX ORDER — GABAPENTIN 600 MG/1
600 TABLET ORAL 4 TIMES DAILY
Qty: 120 TABLET | Refills: 5 | Status: SHIPPED | OUTPATIENT
Start: 2021-05-28 | End: 2021-12-07 | Stop reason: SDUPTHER

## 2021-06-02 ENCOUNTER — TELEPHONE (OUTPATIENT)
Dept: NEUROLOGY | Facility: CLINIC | Age: 51
End: 2021-06-02

## 2021-06-02 NOTE — TELEPHONE ENCOUNTER
----- Message from MILENA Grant sent at 5/28/2021 12:34 PM EDT -----  Drug screen appropriate, refills given on gabapentin

## 2021-06-07 ENCOUNTER — APPOINTMENT (OUTPATIENT)
Dept: CT IMAGING | Facility: HOSPITAL | Age: 51
End: 2021-06-07

## 2021-06-24 ENCOUNTER — TELEPHONE (OUTPATIENT)
Dept: CARDIOLOGY | Facility: CLINIC | Age: 51
End: 2021-06-24

## 2021-06-25 NOTE — TELEPHONE ENCOUNTER
Is this for the myelogram she discussed with Dr. Mills in April?  This is from his note:    4. Pre-operative cardiovascular examination  The patient may safely undergo myelogram with interruption of direct oral anticoagulation therapy for 3 days.  Anticoagulation therapy should be restarted the following day with achieved hemostasis.       Would you please clarify what the procedure is?  Thanks!

## 2021-06-25 NOTE — TELEPHONE ENCOUNTER
The patient is to have a myelogram [facility].  Fax to [phone] letter sent.

## 2021-07-01 ENCOUNTER — TELEPHONE (OUTPATIENT)
Dept: INTERNAL MEDICINE | Facility: CLINIC | Age: 51
End: 2021-07-01

## 2021-08-10 ENCOUNTER — TRANSCRIBE ORDERS (OUTPATIENT)
Dept: ADMINISTRATIVE | Facility: HOSPITAL | Age: 51
End: 2021-08-10

## 2021-08-10 DIAGNOSIS — M48.062 SPINAL STENOSIS, LUMBAR REGION, WITH NEUROGENIC CLAUDICATION: Primary | ICD-10-CM

## 2021-08-15 DIAGNOSIS — I10 ESSENTIAL HYPERTENSION: Primary | ICD-10-CM

## 2021-08-16 RX ORDER — IRBESARTAN 75 MG/1
TABLET ORAL
Qty: 30 TABLET | Refills: 11 | Status: SHIPPED | OUTPATIENT
Start: 2021-08-16 | End: 2022-10-05 | Stop reason: SDUPTHER

## 2021-08-31 ENCOUNTER — APPOINTMENT (OUTPATIENT)
Dept: CT IMAGING | Facility: HOSPITAL | Age: 51
End: 2021-08-31

## 2021-08-31 ENCOUNTER — APPOINTMENT (OUTPATIENT)
Dept: GENERAL RADIOLOGY | Facility: HOSPITAL | Age: 51
End: 2021-08-31

## 2021-09-28 ENCOUNTER — HOSPITAL ENCOUNTER (OUTPATIENT)
Dept: GENERAL RADIOLOGY | Facility: HOSPITAL | Age: 51
Discharge: HOME | End: 2021-09-28

## 2021-09-28 ENCOUNTER — LAB (OUTPATIENT)
Dept: LAB | Facility: HOSPITAL | Age: 51
End: 2021-09-28

## 2021-09-28 ENCOUNTER — HOSPITAL ENCOUNTER (OUTPATIENT)
Dept: CT IMAGING | Facility: HOSPITAL | Age: 51
Discharge: HOME | End: 2021-09-28

## 2021-09-28 DIAGNOSIS — M48.062 SPINAL STENOSIS, LUMBAR REGION, WITH NEUROGENIC CLAUDICATION: ICD-10-CM

## 2021-09-28 LAB
APTT PPP: 29.8 SECONDS (ref 24.5–37.2)
INR PPP: 0.86 (ref 0.9–1.1)
PLATELET # BLD AUTO: 201 10*3/MM3 (ref 140–450)
PROTHROMBIN TIME: 12.2 SECONDS (ref 12–15.1)

## 2021-09-28 PROCEDURE — 62304 MYELOGRAPHY LUMBAR INJECTION: CPT

## 2021-09-28 PROCEDURE — 72132 CT LUMBAR SPINE W/DYE: CPT

## 2021-09-28 PROCEDURE — 25010000003 LIDOCAINE 1 % SOLUTION: Performed by: NURSE PRACTITIONER

## 2021-09-28 PROCEDURE — 85730 THROMBOPLASTIN TIME PARTIAL: CPT | Performed by: NURSE PRACTITIONER

## 2021-09-28 PROCEDURE — 85610 PROTHROMBIN TIME: CPT | Performed by: NURSE PRACTITIONER

## 2021-09-28 PROCEDURE — 72240 MYELOGRAPHY NECK SPINE: CPT

## 2021-09-28 PROCEDURE — 85049 AUTOMATED PLATELET COUNT: CPT | Performed by: NURSE PRACTITIONER

## 2021-09-28 PROCEDURE — 0 IOPAMIDOL 41 % SOLUTION: Performed by: NURSE PRACTITIONER

## 2021-09-28 RX ORDER — LIDOCAINE HYDROCHLORIDE 10 MG/ML
20 INJECTION, SOLUTION INFILTRATION; PERINEURAL ONCE
Status: COMPLETED | OUTPATIENT
Start: 2021-09-28 | End: 2021-09-28

## 2021-09-28 RX ADMIN — IOPAMIDOL 20 ML: 408 INJECTION, SOLUTION INTRATHECAL at 08:51

## 2021-09-28 RX ADMIN — LIDOCAINE HYDROCHLORIDE 20 ML: 10 INJECTION, SOLUTION INFILTRATION; PERINEURAL at 09:16

## 2021-10-05 ENCOUNTER — TELEMEDICINE (OUTPATIENT)
Dept: INTERNAL MEDICINE | Facility: CLINIC | Age: 51
End: 2021-10-05

## 2021-10-05 VITALS
DIASTOLIC BLOOD PRESSURE: 84 MMHG | HEART RATE: 76 BPM | OXYGEN SATURATION: 98 % | SYSTOLIC BLOOD PRESSURE: 136 MMHG | TEMPERATURE: 97.8 F

## 2021-10-05 DIAGNOSIS — J01.40 SUBACUTE PANSINUSITIS: Primary | ICD-10-CM

## 2021-10-05 PROCEDURE — 99213 OFFICE O/P EST LOW 20 MIN: CPT | Performed by: INTERNAL MEDICINE

## 2021-10-05 RX ORDER — BENZONATATE 200 MG/1
200 CAPSULE ORAL 3 TIMES DAILY PRN
Qty: 30 CAPSULE | Refills: 1 | Status: SHIPPED | OUTPATIENT
Start: 2021-10-05 | End: 2021-12-07

## 2021-10-05 RX ORDER — DOXYCYCLINE HYCLATE 100 MG/1
100 CAPSULE ORAL 2 TIMES DAILY WITH MEALS
Qty: 20 CAPSULE | Refills: 0 | Status: SHIPPED | OUTPATIENT
Start: 2021-10-05 | End: 2021-12-07

## 2021-10-05 NOTE — PROGRESS NOTES
You have chosen to receive care through a telehealth visit.  Do you consent to use a video connection for your medical care today? Yes   Active Parties: Marilyn Vermeesch (PCP), Shivani Torres (CMA), and (Pt).  Video visit done via [email or web address] on cell phones.

## 2021-10-05 NOTE — PROGRESS NOTES
Chief Complaint   Patient presents with   • Abstract     Pt states she's had bodyaches, wheezing, congestion, and sinus pressure X 1 week. Pt has been taking tylenol sinus for symptoms.     Subjective   Nely Castrejon is a 51 y.o. female.     Telemedicine video visit with pt today. She is at home.   She is compliant with her mask and has had her vaccine.  She had flu shot about 5 weeks ago.   Her kids were tested last week with panel and had rhinovirus.        URI   This is a new problem. The current episode started in the past 7 days. The problem has been gradually worsening. There has been no fever. Associated symptoms include congestion, coughing, headaches and sinus pain. Pertinent negatives include no ear pain, nausea, sore throat, vomiting or wheezing. Associated symptoms comments: Body aches  Pressure behind right eye. She has tried NSAIDs, acetaminophen, decongestant and antihistamine for the symptoms. The treatment provided mild relief.        The following portions of the patient's history were reviewed and updated as appropriate: allergies, current medications, past family history, past medical history, past social history, past surgical history and problem list.    Review of Systems   Constitutional: Negative for appetite change, chills and fever.   HENT: Positive for congestion, postnasal drip, sinus pressure and sinus pain. Negative for ear pain and sore throat.    Respiratory: Positive for cough. Negative for shortness of breath and wheezing.    Gastrointestinal: Negative for nausea and vomiting.   Neurological: Positive for headaches.       Objective   /84   Pulse 76   Temp 97.8 °F (36.6 °C)   SpO2 98%   There is no height or weight on file to calculate BMI.  Physical Exam  Vitals and nursing note reviewed.   Constitutional:       General: She is not in acute distress.     Appearance: Normal appearance. She is not ill-appearing.      Comments: Kind and pleasant female, appears her age and  is in no distress today   HENT:      Head: Normocephalic and atraumatic.      Right Ear: External ear normal.      Left Ear: External ear normal.      Nose: No rhinorrhea.      Comments: Maxillary tenderness over left sinus, frontal tenderness over left side  Eyes:      General:         Right eye: No discharge.         Left eye: No discharge.      Extraocular Movements: Extraocular movements intact.   Pulmonary:      Effort: Pulmonary effort is normal. No respiratory distress.      Comments: Patient speaks in full sentences, has an occasional dry cough noted, no respiratory distress  Neurological:      Mental Status: She is alert and oriented to person, place, and time.      Cranial Nerves: No cranial nerve deficit.   Psychiatric:         Behavior: Behavior normal.         Thought Content: Thought content normal.         Assessment/Plan   Nely Castrejon is here today and the following problems have been addressed:      Diagnoses and all orders for this visit:    1. Subacute pansinusitis (Primary)    Other orders  -     doxycycline (VIBRAMYCIN) 100 MG capsule; Take 1 capsule by mouth 2 (Two) Times a Day With Meals.  Dispense: 20 capsule; Refill: 0  -     benzonatate (TESSALON) 200 MG capsule; Take 1 capsule by mouth 3 (Three) Times a Day As Needed for Cough.  Dispense: 30 capsule; Refill: 1    Doxycycline 100 mg twice daily for 10 days, take with food for sinusitis symptoms  Given Tessalon Perles to take up to 3 times a day as needed for cough  Recommend plain Mucinex expectorant as patient is also having mild cough symptoms    Return to clinic as needed if symptoms worsen or persist    Time devoted to visit: 19 minutes including time to review previous record, with 75% of time devoted to direct discussion  Patient presents during COVID-19 pandemic/federally declared state of public health emergency.  This service was conducted via telemedicine video visit via [employer].  Patient did not come to clinic due to  symptoms of URI/possible Covid infection.    Please note that portions of this note were completed with a voice recognition program.  Efforts were made to edit dictation, but occasionally words are mistranscribed.

## 2021-10-25 PROBLEM — Z01.810 PRE-OPERATIVE CARDIOVASCULAR EXAMINATION: Status: RESOLVED | Noted: 2021-04-21 | Resolved: 2021-10-25

## 2021-12-07 ENCOUNTER — OFFICE VISIT (OUTPATIENT)
Dept: NEUROLOGY | Facility: CLINIC | Age: 51
End: 2021-12-07

## 2021-12-07 VITALS
TEMPERATURE: 99 F | HEART RATE: 89 BPM | OXYGEN SATURATION: 98 % | WEIGHT: 151 LBS | SYSTOLIC BLOOD PRESSURE: 124 MMHG | BODY MASS INDEX: 26.75 KG/M2 | DIASTOLIC BLOOD PRESSURE: 80 MMHG | HEIGHT: 63 IN

## 2021-12-07 DIAGNOSIS — R52 ELECTRIC SHOCK-TYPE PAIN: Primary | ICD-10-CM

## 2021-12-07 DIAGNOSIS — R20.2 PARESTHESIA: ICD-10-CM

## 2021-12-07 DIAGNOSIS — G89.4 CHRONIC PAIN SYNDROME: ICD-10-CM

## 2021-12-07 DIAGNOSIS — G43.709 CHRONIC MIGRAINE WITHOUT AURA WITHOUT STATUS MIGRAINOSUS, NOT INTRACTABLE: ICD-10-CM

## 2021-12-07 PROCEDURE — 99213 OFFICE O/P EST LOW 20 MIN: CPT | Performed by: NURSE PRACTITIONER

## 2021-12-07 RX ORDER — GABAPENTIN 600 MG/1
600 TABLET ORAL 4 TIMES DAILY
Qty: 120 TABLET | Refills: 5 | Status: SHIPPED | OUTPATIENT
Start: 2021-12-07 | End: 2022-10-05

## 2021-12-07 NOTE — PROGRESS NOTES
"Subjective:     Patient ID: Nely Castrejon is a 51 y.o. female.    CC:   Chief Complaint   Patient presents with   • Numbness   • Med Management       HPI:   History of Present Illness     Ms. Castrejon is here today for follow-up and refills on her gabapentin.       She is a long-term patient of Dr. Cortez followed for many years for several complaints including migraines, chronic pain, neck and back pain and episodic periods of disorientation that she has had after several concussions years ago.  She is had a great deal of work-up including EEG that she did not show for.       I last saw her in May, at that time she was just for her regular follow-up and refills on her gabapentin.  She previously was in pain management although she missed a pill count and was apparently dismissed, this was years ago.    At last visit she has not a refer her back to Dr. Sims, she tells me today that she has seen them, the had ordered a myelogram of her back but more requiring her cardiologist to give him the okay to stop her blood thinner, she tells me that they have been unable to contact her cardiologist.  At last visit she was complaining of \"electrical sensation\" that have been ongoing for years, she asked that I work her up for MS.  We did a CT scan of her head which did not show any demyelinating plaques although this is not the recommended test for MS surveillance.  Patient has a pacemaker and cannot have MRI.  We were unable to get in touch with her after her CT scan, had recommended LP she was interested.  We had to mail her results letter never heard back from her.  Today she tells me she is not really interested in LP.  She continues to have neck pain and burning tingling pain in her arms.     She has occasional urinary incontinence, mostly sounds like stress incontinence.  She denies falls or weakness in her legs.  She denies double vision  She has no other new complaints today    Today she tells me she is here for " gabapentin refills, she continues to have occasional electrical sensations throughout her body but these are improved.  Last visit I ordered CT scan of her neck and she did not keep appointment, she was not interested in lumbar puncture.  Today she tells me she would like to reschedule the CT of her C-spine but her symptoms are improved overall.    She is had a bit of increase in her migraines, her migraines are typical for her, complains of right side headaches a couple times a week, with associated photo and phonophobia.  She denies any new headache features at all.  She currently takes nortriptyline 50 mg at night for insomnia and migraine along with her gabapentin    She is had no spells of disorientation or confusion         The following portions of the patient's history were reviewed and updated as appropriate: allergies, current medications, past family history, past medical history, past social history, past surgical history and problem list.    Past Medical History:   Diagnosis Date   • Arthritis    • Fibromyalgia, primary    • GERD (gastroesophageal reflux disease)    • History of osteoarthritis 8/1/2016   • Hypertension    • Primary stabbing headache    • Sick sinus syndrome (HCC)    • Vitamin B12 deficiency        Past Surgical History:   Procedure Laterality Date   • CHOLECYSTECTOMY     • OOPHORECTOMY     • PACEMAKER IMPLANTATION     • SHOULDER SURGERY      left   • SUBTOTAL HYSTERECTOMY      Partial        Social History     Socioeconomic History   • Marital status:    Tobacco Use   • Smoking status: Never Smoker   • Smokeless tobacco: Never Used   Vaping Use   • Vaping Use: Never used   Substance and Sexual Activity   • Alcohol use: Never   • Drug use: Never   • Sexual activity: Defer       Family History   Problem Relation Age of Onset   • Arthritis Maternal Grandmother    • Hypertension Maternal Grandmother    • Kidney disease Maternal Grandmother    • Breast cancer Neg Hx         Review of  "Systems   Constitutional: Negative.    Eyes: Negative.    Respiratory: Negative.    Cardiovascular: Negative.    Gastrointestinal: Negative.    Endocrine: Negative.    Genitourinary: Negative.    Musculoskeletal: Negative.    Skin: Negative.    Allergic/Immunologic: Negative.    Neurological: Positive for numbness and headaches. Negative for dizziness, tremors, seizures, syncope, facial asymmetry, speech difficulty, weakness and light-headedness.   Hematological: Negative.    Psychiatric/Behavioral: Negative.         Objective:  /80   Pulse 89   Temp 99 °F (37.2 °C)   Ht 160 cm (63\")   Wt 68.5 kg (151 lb)   SpO2 98%   BMI 26.75 kg/m²     Neurologic Exam     Mental Status   Oriented to person, place, and time.   Patient is alert and oriented, speech clear and articulate     Cranial Nerves   Cranial nerves II through XII intact.     CN III, IV, VI   Pupils are equal, round, and reactive to light.    Motor Exam   Muscle bulk: normal  Overall muscle tone: normal  Right arm pronator drift: absent  Left arm pronator drift: absent    Strength   Strength 5/5 throughout.     Gait, Coordination, and Reflexes     Gait  Gait: normal    Coordination   Finger to nose coordination: normal    Tremor   Resting tremor: absent  Intention tremor: absent  Action tremor: absent    Reflexes   Reflexes 2+ except as noted.       Physical Exam  Vitals reviewed.   Constitutional:       General: She is not in acute distress.     Appearance: She is well-developed. She is not ill-appearing or toxic-appearing.   HENT:      Head: Normocephalic and atraumatic.      Mouth/Throat:      Mouth: Mucous membranes are moist.   Eyes:      General: No scleral icterus.     Extraocular Movements: Extraocular movements intact.      Conjunctiva/sclera: Conjunctivae normal.      Pupils: Pupils are equal, round, and reactive to light.   Pulmonary:      Effort: Pulmonary effort is normal. No respiratory distress.   Musculoskeletal:      Cervical back: " Normal range of motion and neck supple.   Skin:     General: Skin is warm.      Capillary Refill: Capillary refill takes less than 2 seconds.   Neurological:      Mental Status: She is alert and oriented to person, place, and time.      Coordination: Finger-Nose-Finger Test normal.      Gait: Gait is intact.      Deep Tendon Reflexes: Strength normal.   Psychiatric:         Mood and Affect: Mood normal.         Behavior: Behavior normal.         Thought Content: Thought content normal.         Judgment: Judgment normal.         Assessment/Plan:       Diagnoses and all orders for this visit:    1. Electric shock-type pain (Primary)  -     CT cervical spine w wo contrast; Future    2. Paresthesia  -     CT cervical spine w wo contrast; Future    3. Chronic pain syndrome  -     gabapentin (NEURONTIN) 600 MG tablet; Take 1 tablet by mouth 4 (Four) Times a Day.  Dispense: 120 tablet; Refill: 5    4. Chronic migraine without aura without status migrainosus, not intractable    Other orders  -     galcanezumab-gnlm (EMGALITY) 120 MG/ML auto-injector pen; Inject 1 mL under the skin into the appropriate area as directed Every 30 (Thirty) Days.  Dispense: 1.12 mL; Refill: 11    For her previous complaints of paresthesias and electrical sensations I have reordered CT of the cervical spine.  She previously did not keep appointment and was not interested lumbar puncture.  She admits that the sensations are improved but she would like to move forward with the scans at this time.    I refilled her gabapentin today, she tells me that her migraines seem to have increased in frequency a bit, she adamantly denies any new headache features, headaches are always on the right side which is consistent with her past migraines.    I am going to start her on Emgality injections, she was given a sample today, lot number [id number] with expiration of January 2023.  She is instructed on proper injection procedure and storage  I did send a message  to her cardiologist who is okay with her starting the Emgality    We will see her back in about 6 to 8 weeks to see how she is doing, she is encouraged to reach out with any questions concerns or problems prior to follow-up appointment    As part of this patient's treatment plan I am prescribing controlled substances. The patient has been made aware of appropriate use of such medications, including potential risk of somnolence, limited ability to drive and/or work safely, and potential for dependence or overdose. It has also been made clear that these medications are for use by the patient only, without concomitant use of alcohol or other substances unless prescribed. Keep out of reach of children.  Adin report has been reviewed. If this is going to be prescribed long term, [facility] [place] Contract has also been read and signed by patient and myself.           Reviewed medications, potential side effects and signs and symptoms to report. Discussed risk versus benefits of treatment plan with patient and/or family-including medications, labs and radiology that may be ordered. Addressed questions and concerns during visit. Patient and/or family verbalized understanding and agree with plan.    AS THE PROVIDER, I PERSONALLY WORE PPE DURING ENTIRE FACE TO FACE ENCOUNTER IN CLINIC WITH THE PATIENT. PATIENT ALSO WORE PPE DURING ENTIRE FACE TO FACE ENCOUNTER EXCEPT FOR A MAX OF 30 SECONDS DURING NEUROLOGICAL EVALUATION OF CRANIAL NERVES AND THEN MASK WAS PLACED BACK OVER PATIENT FACE FOR REMAINDER OF VISIT. I WASHED MY HANDS BEFORE AND AFTER VISIT.    During this visit the following were done:  Labs Reviewed []    Labs Ordered []    Radiology Reports Reviewed []    Radiology Ordered []    PCP Records Reviewed []    Referring Provider Records Reviewed []    ER Records Reviewed []    Hospital Records Reviewed []    History Obtained From Family []    Radiology Images Reviewed []    Other Reviewed []     Records Requested []      Adeola Patricio, APRN  12/7/2021

## 2021-12-14 ENCOUNTER — APPOINTMENT (OUTPATIENT)
Dept: CT IMAGING | Facility: HOSPITAL | Age: 51
End: 2021-12-14

## 2022-01-20 ENCOUNTER — PRIOR AUTHORIZATION (OUTPATIENT)
Dept: NEUROLOGY | Facility: CLINIC | Age: 52
End: 2022-01-20

## 2022-01-20 NOTE — TELEPHONE ENCOUNTER
Pa SUBMITTED FOR EMGALITY. KEY [id number]. (THERE IN NOT ANYTHING IN THE NOTES STATING THAT SHE HAS TRIED AND FAILED THE TYPICAL DAILY MEDICAITONS. PER HER PHARMACY THIS IS NOT A COVERED MEDICATION)

## 2022-04-06 ENCOUNTER — OFFICE VISIT (OUTPATIENT)
Dept: CARDIOLOGY | Facility: CLINIC | Age: 52
End: 2022-04-06

## 2022-04-06 VITALS
BODY MASS INDEX: 24.34 KG/M2 | OXYGEN SATURATION: 99 % | SYSTOLIC BLOOD PRESSURE: 120 MMHG | HEIGHT: 63 IN | HEART RATE: 97 BPM | WEIGHT: 137.4 LBS | DIASTOLIC BLOOD PRESSURE: 78 MMHG

## 2022-04-06 DIAGNOSIS — I10 PRIMARY HYPERTENSION: ICD-10-CM

## 2022-04-06 DIAGNOSIS — I48.0 PAROXYSMAL ATRIAL FIBRILLATION: Primary | ICD-10-CM

## 2022-04-06 DIAGNOSIS — Z86.79 HISTORY OF SICK SINUS SYNDROME: ICD-10-CM

## 2022-04-06 DIAGNOSIS — Z95.0 CARDIAC PACEMAKER IN SITU: ICD-10-CM

## 2022-04-06 PROCEDURE — 99213 OFFICE O/P EST LOW 20 MIN: CPT | Performed by: INTERNAL MEDICINE

## 2022-04-06 PROCEDURE — 93280 PM DEVICE PROGR EVAL DUAL: CPT | Performed by: INTERNAL MEDICINE

## 2022-04-06 NOTE — PROGRESS NOTES
Subjective:     Encounter Date:04/06/2022      Patient ID: Nely Castrejon is a 52 y.o. female.    Chief Complaint: Tachycardia-bradycardia syndrome  HPI  This is a 52-year-old female patient who presents to cardiology clinic today for pacemaker interrogation.  She reports compliance with her medications with no perceived side effects.  There has been no recurrence of atrial fibrillation.  She has had no bleeding complications related to anticoagulation therapy with Eliquis.  Formal pacemaker interrogation shows a normally functioning [employer] dual-chamber rate responsive [id number]/120.  Model number: 2210.  She is paced 48% of time the right atrium less than 1% of the time the right ventricle.  Right atrial and right ventricular lead function is normal based on sensing, pacing threshold and lead impedance.  She has an estimated 3 years of generator longevity.  The patient was noted to have PMT. PVARP was increased to 325 ms.  There was no mode switches.  The following portions of the patient's history were reviewed and updated as appropriate: allergies, current medications, past family history, past medical history, past social history, past surgical history and problem  Review of Systems   Constitutional: Negative for chills, diaphoresis, fever, malaise/fatigue, weight gain and weight loss.   HENT: Negative for ear discharge, hearing loss, hoarse voice and nosebleeds.    Eyes: Negative for discharge, double vision, pain and photophobia.   Cardiovascular: Negative for chest pain, claudication, cyanosis, dyspnea on exertion, irregular heartbeat, leg swelling, near-syncope, orthopnea, palpitations, paroxysmal nocturnal dyspnea and syncope.   Respiratory: Negative for cough, hemoptysis, shortness of breath, sputum production and wheezing.    Endocrine: Negative for cold intolerance, heat intolerance, polydipsia, polyphagia and polyuria.   Hematologic/Lymphatic: Negative for adenopathy and bleeding  problem. Does not bruise/bleed easily.   Skin: Negative for color change, flushing, itching and rash.   Musculoskeletal: Negative for muscle cramps, muscle weakness, myalgias and stiffness.   Gastrointestinal: Negative for abdominal pain, diarrhea, hematemesis, hematochezia, nausea and vomiting.   Genitourinary: Negative for dysuria, frequency and nocturia.   Neurological: Negative for focal weakness, loss of balance, numbness, paresthesias and seizures.   Psychiatric/Behavioral: Negative for altered mental status, hallucinations and suicidal ideas.   Allergic/Immunologic: Negative for HIV exposure, hives and persistent infections.           Current Outpatient Medications:   •  baclofen (LIORESAL) 10 MG tablet, Take 1 tablet by mouth 3 (Three) Times a Day As Needed for Muscle Spasms., Disp: 270 tablet, Rfl: 3  •  Cholecalciferol (VITAMIN D3) 125 MCG (5000 UT) capsule capsule, Take 5,000 Units by mouth Daily., Disp: , Rfl:   •  Cyanocobalamin (B-12) 1000 MCG capsule, take 1 capsule by mouth daily, Disp: 60 capsule, Rfl: 6  •  dexlansoprazole (Dexilant) 60 MG capsule, Take 1 capsule by mouth Daily. Indications: Gastroesophageal Reflux Disease, Disp: 90 capsule, Rfl: 3  •  gabapentin (NEURONTIN) 600 MG tablet, Take 1 tablet by mouth 4 (Four) Times a Day., Disp: 120 tablet, Rfl: 5  •  galcanezumab-gnlm (EMGALITY) 120 MG/ML auto-injector pen, Inject 1 mL under the skin into the appropriate area as directed Every 30 (Thirty) Days., Disp: 1.12 mL, Rfl: 11  •  irbesartan (AVAPRO) 75 MG tablet, TAKE 1 TABLET BY MOUTH EVERY DAY, Disp: 30 tablet, Rfl: 11  •  nortriptyline (PAMELOR) 25 MG capsule, Take 2 capsules by mouth Every Night., Disp: 60 capsule, Rfl: 5  •  rivaroxaban (Xarelto) 20 MG tablet, Take 1 tablet by mouth Daily With Dinner., Disp: 30 tablet, Rfl: 11    Objective:   Vitals and nursing note reviewed.   Constitutional:       Appearance: Healthy appearance. Not in distress.   Neck:      Vascular: No JVR. JVD  "normal.   Pulmonary:      Effort: Pulmonary effort is normal.      Breath sounds: Normal breath sounds. No wheezing. No rhonchi. No rales.   Chest:      Chest wall: Not tender to palpatation.   Cardiovascular:      PMI at left midclavicular line. Normal rate. Regular rhythm. Normal S1. Normal S2.      Murmurs: There is no murmur.      No gallop. No click. No rub.   Pulses:     Intact distal pulses.   Edema:     Peripheral edema absent.   Abdominal:      General: Bowel sounds are normal.      Palpations: Abdomen is soft.      Tenderness: There is no abdominal tenderness.   Musculoskeletal: Normal range of motion.         General: No tenderness. Skin:     General: Skin is warm and dry.   Neurological:      General: No focal deficit present.      Mental Status: Alert and oriented to person, place and time.       Blood pressure 120/78, pulse 97, height 160 cm (63\"), weight 62.3 kg (137 lb 6.4 oz), SpO2 99 %.   Lab Review:     Assessment:       1. Paroxysmal atrial fibrillation (HCC)  No recurrent atrial fibrillation based on formal pacemaker interrogation.    2. History of sick sinus syndrome  Normal pacemaker function.    3. Primary hypertension  Acceptable blood pressure control.    Procedures    Plan:     No changes in medications have been made at today's visit.  No further cardiovascular testing is warranted at this time.      "

## 2022-10-05 ENCOUNTER — OFFICE VISIT (OUTPATIENT)
Dept: CARDIOLOGY | Facility: CLINIC | Age: 52
End: 2022-10-05

## 2022-10-05 VITALS
BODY MASS INDEX: 24.98 KG/M2 | DIASTOLIC BLOOD PRESSURE: 88 MMHG | WEIGHT: 141 LBS | HEART RATE: 85 BPM | SYSTOLIC BLOOD PRESSURE: 130 MMHG | HEIGHT: 63 IN | OXYGEN SATURATION: 98 %

## 2022-10-05 DIAGNOSIS — I10 PRIMARY HYPERTENSION: ICD-10-CM

## 2022-10-05 DIAGNOSIS — I49.5 TACHYCARDIA-BRADYCARDIA: ICD-10-CM

## 2022-10-05 DIAGNOSIS — Z95.0 CARDIAC PACEMAKER IN SITU: ICD-10-CM

## 2022-10-05 DIAGNOSIS — I48.0 PAROXYSMAL ATRIAL FIBRILLATION: Primary | ICD-10-CM

## 2022-10-05 DIAGNOSIS — I10 ESSENTIAL HYPERTENSION: ICD-10-CM

## 2022-10-05 PROCEDURE — 93280 PM DEVICE PROGR EVAL DUAL: CPT | Performed by: INTERNAL MEDICINE

## 2022-10-05 PROCEDURE — 99213 OFFICE O/P EST LOW 20 MIN: CPT | Performed by: INTERNAL MEDICINE

## 2022-10-05 RX ORDER — METOPROLOL SUCCINATE 50 MG/1
50 TABLET, EXTENDED RELEASE ORAL DAILY
Qty: 30 TABLET | Refills: 11 | Status: SHIPPED | OUTPATIENT
Start: 2022-10-05

## 2022-10-05 RX ORDER — IRBESARTAN 75 MG/1
75 TABLET ORAL DAILY
Qty: 30 TABLET | Refills: 11 | Status: SHIPPED | OUTPATIENT
Start: 2022-10-05 | End: 2023-01-11 | Stop reason: DRUGHIGH

## 2022-10-05 NOTE — PROGRESS NOTES
Subjective:     Encounter Date:10/05/2022      Patient ID: Nely Castrejon is a 52 y.o. female.    Chief Complaint: Atrial fibrillation  HPI  Is a 52-year-old female patient who presents to cardiology clinic for routine follow-up and pacemaker interrogation.  The patient indicates she has done well since her last visit with no active cardiovascular issues, symptoms or hospitalizations.  She reports compliance with her medications with no perceived side effects.  She has had no bleeding complications related to anticoagulation therapy with Xarelto.  She has had no signs or symptoms to suggest stroke, TIA or other cardioembolic events.  The patient has a normally functioning [employer] dual-chamber rate responsive pacemaker model, [employer] DR RF 2210; DDR75.  Lower rate limit behavior for atrial pacing is set at 55 bpm.  Underlying rhythm is sinus rhythm at 70 bpm.  She is paced 0% of the time the right atrium and 1.4% of the time the right ventricle.  She has an estimated 3-6 months of generator longevity.  She is not actively transmitting and is not home monitored.  She has had 292 mode switches for an atrial fibrillation burden of 1%.  Longest episode was 19 minutes.  She has had 25 high heart rate events all of which appear to be A. fib driven.  Even though the patient is only paced less than 2% of the time the right ventricle right ventricular lead malfunction is resulting in considerable generator charge drainage.  An audible BRE alert has been programmed into her device.  She remains a lifelong non-smoker.  She reports compliance with her medications with no perceived side effects.  The following portions of the patient's history were reviewed and updated as appropriate: allergies, current medications, past family history, past medical history, past social history, past surgical history and problem  Review of Systems   Constitutional: Negative for chills, diaphoresis, fever, malaise/fatigue, weight gain  and weight loss.   HENT: Negative for ear discharge, hearing loss, hoarse voice and nosebleeds.    Eyes: Negative for discharge, double vision, pain and photophobia.   Cardiovascular: Negative for chest pain, claudication, cyanosis, dyspnea on exertion, irregular heartbeat, leg swelling, near-syncope, orthopnea, palpitations, paroxysmal nocturnal dyspnea and syncope.   Respiratory: Negative for cough, hemoptysis, shortness of breath, sputum production and wheezing.    Endocrine: Negative for cold intolerance, heat intolerance, polydipsia, polyphagia and polyuria.   Hematologic/Lymphatic: Negative for adenopathy and bleeding problem. Does not bruise/bleed easily.   Skin: Negative for color change, flushing, itching and rash.   Musculoskeletal: Negative for muscle cramps, muscle weakness, myalgias and stiffness.   Gastrointestinal: Negative for abdominal pain, diarrhea, hematemesis, hematochezia, nausea and vomiting.   Genitourinary: Negative for dysuria, frequency and nocturia.   Neurological: Negative for focal weakness, loss of balance, numbness, paresthesias and seizures.   Psychiatric/Behavioral: Negative for altered mental status, hallucinations and suicidal ideas.   Allergic/Immunologic: Negative for HIV exposure, hives and persistent infections.           Current Outpatient Medications:   •  Cholecalciferol (VITAMIN D3) 125 MCG (5000 UT) capsule capsule, Take 5,000 Units by mouth Daily., Disp: , Rfl:   •  Cyanocobalamin (B-12) 1000 MCG capsule, take 1 capsule by mouth daily, Disp: 60 capsule, Rfl: 6  •  dexlansoprazole (Dexilant) 60 MG capsule, Take 1 capsule by mouth Daily. Indications: Gastroesophageal Reflux Disease, Disp: 90 capsule, Rfl: 3  •  irbesartan (AVAPRO) 75 MG tablet, Take 1 tablet by mouth Daily., Disp: 30 tablet, Rfl: 11  •  metoprolol succinate XL (TOPROL-XL) 50 MG 24 hr tablet, Take 1 tablet by mouth Daily., Disp: 30 tablet, Rfl: 11  •  rivaroxaban (Xarelto) 20 MG tablet, Take 1 tablet by  "mouth Daily With Dinner., Disp: 30 tablet, Rfl: 11    Objective:   Vitals and nursing note reviewed.   Constitutional:       Appearance: Healthy appearance. Not in distress.   Neck:      Vascular: No JVR. JVD normal.   Pulmonary:      Effort: Pulmonary effort is normal.      Breath sounds: Normal breath sounds. No wheezing. No rhonchi. No rales.   Chest:      Chest wall: Not tender to palpatation.   Cardiovascular:      PMI at left midclavicular line. Normal rate. Regular rhythm. Normal S1. Normal S2.      Murmurs: There is no murmur.      No gallop. No click. No rub.   Pulses:     Intact distal pulses.   Edema:     Peripheral edema absent.   Abdominal:      General: Bowel sounds are normal.      Palpations: Abdomen is soft.      Tenderness: There is no abdominal tenderness.   Musculoskeletal: Normal range of motion.         General: No tenderness. Skin:     General: Skin is warm and dry.   Neurological:      General: No focal deficit present.      Mental Status: Alert and oriented to person, place and time.       Blood pressure 130/88, pulse 85, height 160 cm (63\"), weight 64 kg (141 lb), SpO2 98 %.   Lab Review:     Assessment:       1. Paroxysmal atrial fibrillation (HCC)  Rate control and anticoagulation strategy.  Asymptomatic.  Less than 1% atrial fibrillation burden.    2. Primary hypertension  Acceptable blood pressure control.    3. Tachycardia-bradycardia (HCC)  Pacemaker interrogation demonstrates excessive generator charge drain from a malfunctioning RV lead.  Generator is prematurely approaching end-of-life.    4. Essential hypertension    - irbesartan (AVAPRO) 75 MG tablet; Take 1 tablet by mouth Daily.  Dispense: 30 tablet; Refill: 11    Procedures    Plan:     I have recommended adding Toprol-XL 50 mg orally once per day for combined improved blood pressure control and better heart rate control.    She will be followed up every 3 months for the time being until we can ascertain the degree of " excessive generator charge drain.  At the time of generator replacement the patient may require right ventricular lead revision.  Fortunately, she has minimal pacing requirements.

## 2023-01-11 ENCOUNTER — OFFICE VISIT (OUTPATIENT)
Dept: CARDIOLOGY | Facility: CLINIC | Age: 53
End: 2023-01-11
Payer: MEDICARE

## 2023-01-11 VITALS
HEIGHT: 63 IN | WEIGHT: 146 LBS | TEMPERATURE: 96.8 F | BODY MASS INDEX: 25.87 KG/M2 | RESPIRATION RATE: 12 BRPM | OXYGEN SATURATION: 96 % | HEART RATE: 62 BPM

## 2023-01-11 DIAGNOSIS — Z95.0 PACEMAKER: Primary | ICD-10-CM

## 2023-01-11 DIAGNOSIS — I49.5 TACHYCARDIA-BRADYCARDIA: ICD-10-CM

## 2023-01-11 DIAGNOSIS — Z95.0 CARDIAC PACEMAKER IN SITU: ICD-10-CM

## 2023-01-11 DIAGNOSIS — I48.0 PAROXYSMAL ATRIAL FIBRILLATION: Primary | ICD-10-CM

## 2023-01-11 DIAGNOSIS — I48.0 PAROXYSMAL ATRIAL FIBRILLATION: ICD-10-CM

## 2023-01-11 DIAGNOSIS — I10 PRIMARY HYPERTENSION: ICD-10-CM

## 2023-01-11 PROCEDURE — 99214 OFFICE O/P EST MOD 30 MIN: CPT | Performed by: INTERNAL MEDICINE

## 2023-01-11 PROCEDURE — 93280 PM DEVICE PROGR EVAL DUAL: CPT | Performed by: INTERNAL MEDICINE

## 2023-01-11 RX ORDER — IRBESARTAN 150 MG/1
150 TABLET ORAL NIGHTLY
Qty: 90 TABLET | Refills: 4 | Status: SHIPPED | OUTPATIENT
Start: 2023-01-11

## 2023-01-11 RX ORDER — CHLORTHALIDONE 25 MG/1
12.5 TABLET ORAL DAILY
Qty: 15 TABLET | Refills: 11 | Status: SHIPPED | OUTPATIENT
Start: 2023-01-11

## 2023-01-11 NOTE — PROGRESS NOTES
"    Subjective:     Encounter Date:01/11/2023      Patient ID: Nely Castrejon is a 52 y.o. female.    Chief Complaint: Palpitation  HPI  This is a 52-year-old female patient who presents to cardiology clinic for routine follow-up and pacemaker interrogation.  She has a normally functioning [employer] dual-chamber rate responsive pacemaker model number PM 2210.  The mode is DDDr-75.  She is paced 56% of time the right atrium and less than 1% of the time the right ventricle.  Right atrial and right ventricular lead interrogation shows normal function based on sensing, pacing threshold and lead impedance.  She has an estimated 2.5-9 months of generator longevity.  Her basic underlying rhythm is sinus at 70 bpm.  She has had numerous mode switches which are not due to atrial fibrillation but actually due to sensor malfunction (RNRVAS).  Since her \"threshold\" has been decreased.  The patient has also had 5 high heart rate events identified as PSVT but not recurrent atrial fibrillation.  The patient has a prior history of PVC ablation by Dr. Hughes.  The patient continues to have intermittent palpitations and fatigue.  She has had no dizziness or syncope.  She reports compliance with her medications with no perceived side effects.  She has had no bleeding complications related to anticoagulation therapy with Xarelto.  She has had no clinically documented recurrent episodes of atrial fibrillation or atrial flutter.  There have been no signs or symptoms to suggest stroke, TIA or other cardioembolic event.  The following portions of the patient's history were reviewed and updated as appropriate: allergies, current medications, past family history, past medical history, past social history, past surgical history and problem  Review of Systems   Constitutional: Positive for malaise/fatigue. Negative for chills, diaphoresis, fever, weight gain and weight loss.   HENT: Negative for ear discharge, hearing loss, hoarse " voice and nosebleeds.    Eyes: Negative for discharge, double vision, pain and photophobia.   Cardiovascular: Positive for palpitations. Negative for chest pain, claudication, cyanosis, dyspnea on exertion, irregular heartbeat, leg swelling, near-syncope, orthopnea, paroxysmal nocturnal dyspnea and syncope.   Respiratory: Negative for cough, hemoptysis, shortness of breath, sputum production and wheezing.    Endocrine: Negative for cold intolerance, heat intolerance, polydipsia, polyphagia and polyuria.   Hematologic/Lymphatic: Negative for adenopathy and bleeding problem. Does not bruise/bleed easily.   Skin: Negative for color change, flushing, itching and rash.   Musculoskeletal: Negative for muscle cramps, muscle weakness, myalgias and stiffness.   Gastrointestinal: Negative for abdominal pain, diarrhea, hematemesis, hematochezia, nausea and vomiting.   Genitourinary: Negative for dysuria, frequency and nocturia.   Neurological: Negative for focal weakness, loss of balance, numbness, paresthesias and seizures.   Psychiatric/Behavioral: Negative for altered mental status, hallucinations and suicidal ideas.   Allergic/Immunologic: Negative for HIV exposure, hives and persistent infections.           Current Outpatient Medications:   •  Cholecalciferol (VITAMIN D3) 125 MCG (5000 UT) capsule capsule, Take 5,000 Units by mouth Daily., Disp: , Rfl:   •  Cyanocobalamin (B-12) 1000 MCG capsule, take 1 capsule by mouth daily, Disp: 60 capsule, Rfl: 6  •  dexlansoprazole (Dexilant) 60 MG capsule, Take 1 capsule by mouth Daily. Indications: Gastroesophageal Reflux Disease, Disp: 90 capsule, Rfl: 3  •  metoprolol succinate XL (TOPROL-XL) 50 MG 24 hr tablet, Take 1 tablet by mouth Daily., Disp: 30 tablet, Rfl: 11  •  rivaroxaban (Xarelto) 20 MG tablet, Take 1 tablet by mouth Daily With Dinner., Disp: 30 tablet, Rfl: 11  •  chlorthalidone (HYGROTON) 25 MG tablet, Take 0.5 tablets by mouth Daily., Disp: 15 tablet, Rfl: 11  •   "irbesartan (Avapro) 150 MG tablet, Take 1 tablet by mouth Every Night., Disp: 90 tablet, Rfl: 4    Objective:   Vitals and nursing note reviewed.   Constitutional:       Appearance: Healthy appearance. Not in distress.   Neck:      Vascular: No JVR. JVD normal.   Pulmonary:      Effort: Pulmonary effort is normal.      Breath sounds: Normal breath sounds. No wheezing. No rhonchi. No rales.   Chest:      Chest wall: Not tender to palpatation.   Cardiovascular:      PMI at left midclavicular line. Normal rate. Regular rhythm. Normal S1. Normal S2.      Murmurs: There is no murmur.      No gallop. No click. No rub.   Pulses:     Intact distal pulses.   Edema:     Peripheral edema absent.   Abdominal:      General: Bowel sounds are normal.      Palpations: Abdomen is soft.      Tenderness: There is no abdominal tenderness.   Musculoskeletal: Normal range of motion.         General: No tenderness. Skin:     General: Skin is warm and dry.   Neurological:      General: No focal deficit present.      Mental Status: Alert and oriented to person, place and time.       Pulse 62,BP: 138/84,  temperature 96.8 °F (36 °C), resp. rate 12, height 160 cm (63\"), weight 66.2 kg (146 lb), SpO2 96 %.   Lab Review:     Assessment:       1. Paroxysmal atrial fibrillation (HCC)  No recurrent atrial fibrillation over the last 6 months.  However the patient does have evidence of recurrent symptomatic episodes of PSVT.    2. Primary hypertension  Less than ideal blood pressure control.    3. Tachycardia-bradycardia (HCC)  Pacemaker sensor threshold reset which will hopefully eliminate mode switches.  The patient is approaching need for generator change out.    Procedures    Plan:     I have recommended referral to electrophysiology in [facility] to evaluate ongoing management of paroxysmal atrial fibrillation, newly recognized PSVT and pacemaker generator approaching end-of-life.    I have recommended increasing Avapro to 150 mg orally " once per day.    I have recommended starting chlorthalidone 12.5 mg orally once per day.    No cardiovascular testing is indicated at this time.

## 2023-02-21 ENCOUNTER — OFFICE VISIT (OUTPATIENT)
Dept: CARDIOLOGY | Facility: CLINIC | Age: 53
End: 2023-02-21
Payer: MEDICARE

## 2023-02-21 VITALS
HEART RATE: 79 BPM | BODY MASS INDEX: 24.84 KG/M2 | DIASTOLIC BLOOD PRESSURE: 82 MMHG | OXYGEN SATURATION: 99 % | WEIGHT: 140.2 LBS | SYSTOLIC BLOOD PRESSURE: 128 MMHG | HEIGHT: 63 IN

## 2023-02-21 DIAGNOSIS — I48.0 PAROXYSMAL ATRIAL FIBRILLATION: Primary | ICD-10-CM

## 2023-02-21 DIAGNOSIS — Z95.0 PACEMAKER: ICD-10-CM

## 2023-02-21 PROCEDURE — 93280 PM DEVICE PROGR EVAL DUAL: CPT | Performed by: STUDENT IN AN ORGANIZED HEALTH CARE EDUCATION/TRAINING PROGRAM

## 2023-02-21 PROCEDURE — 93000 ELECTROCARDIOGRAM COMPLETE: CPT | Performed by: STUDENT IN AN ORGANIZED HEALTH CARE EDUCATION/TRAINING PROGRAM

## 2023-02-21 PROCEDURE — 99204 OFFICE O/P NEW MOD 45 MIN: CPT | Performed by: STUDENT IN AN ORGANIZED HEALTH CARE EDUCATION/TRAINING PROGRAM

## 2023-02-21 RX ORDER — FLECAINIDE ACETATE 50 MG/1
50 TABLET ORAL 2 TIMES DAILY
Qty: 60 TABLET | Refills: 11 | Status: SHIPPED | OUTPATIENT
Start: 2023-02-21

## 2023-02-21 NOTE — PROGRESS NOTES
[facility]  INITIAL OFFICE CONSULT      Nely Castrejon  1970  PCP: Jie Mayo MD    SUBJECTIVE:   Nely Castrejon is a 52 y.o. female seen for a consultation visit regarding the following:     Chief Complaint: No chief complaint on file.         Consultation is requested by Jm Mills MD for evaluation of No chief complaint on file.        History:  Patient is a very pleasant 52-year-old female with a history of sinus node dysfunction status post dual-chamber pacemaker, PVCs status post prior ablation, paroxysmal atrial fibrillation and paroxysmal atrial tachycardia.  She most recently has been seen by Dr. Mills.  Her pacemaker is nearing the elective replacement interval.  She also has been having more episodes of atrial tachycardia and she was referred here for further evaluation of this.    She says that she remembers feeling her heart skip as far back as she can remember.  She will mostly notice sensation of her heart fluttering as well as racing from time to time.  Will be associated with fatigue.  Denies any chest pain, dyspnea, orthopnea, PND.  She does get occasional lower extremity swelling.  No problems with her pacemaker site.      Cardiac PMH: (Old records have been reviewed and summarized below)  1. PAF  a. Tachybradycardia  b. [employer] PPM  c. Negative MPS 5/2020  d. Chadsavasc=2.   2. PSVT  3. PVC's  a. Remote EP RFA Dr. Hughes  4. HTN; Controlled, on Toprol, Chlorthalidone, Avapro  5. GERD, Aciphex  6. Depression, Anxiety  7.       Past Medical History, Past Surgical History, Family history, Social History, and Medications were all reviewed with the patient today and updated as necessary.     Current Outpatient Medications   Medication Sig Dispense Refill   • chlorthalidone (HYGROTON) 25 MG tablet Take 0.5 tablets by mouth Daily. 15 tablet 11   • Cholecalciferol (VITAMIN D3) 125 MCG (5000 UT) capsule capsule Take 5,000 Units by mouth  Daily.     • Cyanocobalamin (B-12) 1000 MCG capsule take 1 capsule by mouth daily 60 capsule 6   • dexlansoprazole (Dexilant) 60 MG capsule Take 1 capsule by mouth Daily. Indications: Gastroesophageal Reflux Disease 90 capsule 3   • irbesartan (Avapro) 150 MG tablet Take 1 tablet by mouth Every Night. 90 tablet 4   • metoprolol succinate XL (TOPROL-XL) 50 MG 24 hr tablet Take 1 tablet by mouth Daily. 30 tablet 11   • rivaroxaban (Xarelto) 20 MG tablet Take 1 tablet by mouth Daily With Dinner. 30 tablet 11     No current facility-administered medications for this visit.     No Known Allergies      Past Medical History:   Diagnosis Date   • Arthritis    • Fibromyalgia, primary    • GERD (gastroesophageal reflux disease)    • History of osteoarthritis 8/1/2016   • Hypertension    • Primary stabbing headache    • Sick sinus syndrome (HCC)    • Vitamin B12 deficiency      Past Surgical History:   Procedure Laterality Date   • CHOLECYSTECTOMY     • OOPHORECTOMY     • PACEMAKER IMPLANTATION     • SHOULDER SURGERY      left   • SUBTOTAL HYSTERECTOMY      Partial      Family History   Problem Relation Age of Onset   • Arthritis Maternal Grandmother    • Hypertension Maternal Grandmother    • Kidney disease Maternal Grandmother    • Breast cancer Neg Hx      Social History     Tobacco Use   • Smoking status: Never   • Smokeless tobacco: Never   Substance Use Topics   • Alcohol use: Never            PHYSICAL EXAM:   There were no vitals taken for this visit.     Wt Readings from Last 5 Encounters:   01/11/23 66.2 kg (146 lb)   10/05/22 64 kg (141 lb)   04/06/22 62.3 kg (137 lb 6.4 oz)   12/07/21 68.5 kg (151 lb)   05/24/21 68.5 kg (151 lb)     BP Readings from Last 5 Encounters:   10/05/22 130/88   04/06/22 120/78   12/07/21 124/80   10/05/21 136/84   05/24/21 120/80       General-Well Nourished, Well developed  Eyes - PERRLA  Neck- supple, No mass  CV- regular rate and rhythm, no MRG  Lung- clear bilaterally  Abd- soft,  +BS  Musc/skel - Norm strength and range of motion  Skin- warm and dry  Neuro - Alert & Oriented x 3, appropriate mood.    Patient's external notes were reviewed.  Independent interpretation of test performed by another physician in facility were reviewed.  Outside laboratory data was also reviewed.    Medical problems and test results were reviewed with the patient today.     EKG:  (EKG/Tracing has been independently visualized by me and summarized below)      ECG 12 Lead    Date/Time: 2/21/2023 2:24 PM  Performed by: Andrei Raphael MD  Authorized by: Andrei Raphael MD   Comparison: compared with previous ECG from 5/17/2015  Similar to previous ECG  Comments: Sinus rhythm without abnormality            ASSESSMENT   1. SVT  2. Afib: PAF. Chadsvassc=2  3. PVC's, Remote RFA Dr. Hughes,   4. Tachybrady syndrome: [employer] PPM.       PLAN    Her pacemaker battery is nearing the elective replacement interval.  Her device was interrogated and she has approximately 2 to 7 months remaining.  She is pacing 5% of the time in the atrium and less than 1% of the time in the ventricle.  She does have several high ventricular rate episodes are difficult to tell whether these are an atrial tachycardia versus sinus tachycardia.  There was 1 episode that clearly is atrial tachycardia that lasted for about 5 to second seconds with a heart rate of 170 bpm.  We are going to set her up with a home monitor so that we can closely monitor for when she reaches BRE.      Of note, her threshold on her ventricular lead is chronically elevated.  Today it is 1.25 V at 1.5 ms.  Impedance is also low at 180 ohms.  This would suggest a possible insulation breach.  She is not pacing all the ventricle, and at the current time I do not believe there is any need to place a new lead.    She is reported to have a history of atrial fibrillation although there has been none on her device recently.  She does have some episodes of atrial tachycardia.  She  is at least moderately symptomatic with this.  We discussed options going forward.  We could definitely trial some medication to see this would help her symptoms.  She is interested in this.  We will start her on 50 mg of flecainide twice daily and see how she does with this.  We will give this a few weeks and if her symptoms are not well controlled we can consider uptitrating her dose.      She will continue on Xarelto for anticoagulation.  Blood pressure is currently well controlled on chlorthalidone and irbesartan.    She does note a history of mitral valve prolapse.  She does not think she has had an echocardiogram in some time.  We can go ahead and get an echocardiogram at the time of her generator change unless Dr. Mills would like to get one sooner.

## 2023-02-28 ENCOUNTER — TELEPHONE (OUTPATIENT)
Dept: CARDIOLOGY | Facility: CLINIC | Age: 53
End: 2023-02-28
Payer: MEDICARE

## 2023-02-28 NOTE — TELEPHONE ENCOUNTER
Spoke with patient letting her know that we got her bedside home monitor readings switched over to our office. Asked her to try to set up her monitor on her own or give a call to [employer] for assistance.

## 2023-03-17 PROCEDURE — 93296 REM INTERROG EVL PM/IDS: CPT | Performed by: STUDENT IN AN ORGANIZED HEALTH CARE EDUCATION/TRAINING PROGRAM

## 2023-03-17 PROCEDURE — 93294 REM INTERROG EVL PM/LDLS PM: CPT | Performed by: STUDENT IN AN ORGANIZED HEALTH CARE EDUCATION/TRAINING PROGRAM

## 2023-08-11 ENCOUNTER — OFFICE VISIT (OUTPATIENT)
Dept: INTERNAL MEDICINE | Facility: CLINIC | Age: 53
End: 2023-08-11
Payer: MEDICARE

## 2023-08-11 VITALS
WEIGHT: 142.4 LBS | TEMPERATURE: 98.4 F | HEART RATE: 87 BPM | HEIGHT: 63 IN | DIASTOLIC BLOOD PRESSURE: 80 MMHG | SYSTOLIC BLOOD PRESSURE: 122 MMHG | BODY MASS INDEX: 25.23 KG/M2 | RESPIRATION RATE: 16 BRPM | OXYGEN SATURATION: 98 %

## 2023-08-11 DIAGNOSIS — R73.9 HYPERGLYCEMIA: ICD-10-CM

## 2023-08-11 DIAGNOSIS — Z00.00 MEDICARE ANNUAL WELLNESS VISIT, SUBSEQUENT: Primary | ICD-10-CM

## 2023-08-11 DIAGNOSIS — R13.19 ESOPHAGEAL DYSPHAGIA: ICD-10-CM

## 2023-08-11 DIAGNOSIS — R05.3 CHRONIC COUGH: ICD-10-CM

## 2023-08-11 DIAGNOSIS — R79.9 ABNORMAL BLOOD CHEMISTRY: ICD-10-CM

## 2023-08-11 DIAGNOSIS — N64.4 BREAST PAIN, LEFT: ICD-10-CM

## 2023-08-11 DIAGNOSIS — Z13.220 SCREENING, LIPID: ICD-10-CM

## 2023-08-11 DIAGNOSIS — K21.9 GASTROESOPHAGEAL REFLUX DISEASE, UNSPECIFIED WHETHER ESOPHAGITIS PRESENT: ICD-10-CM

## 2023-08-11 DIAGNOSIS — E53.8 B12 DEFICIENCY: ICD-10-CM

## 2023-08-11 DIAGNOSIS — R22.2 SUPRACLAVICULAR FOSSA FULLNESS: ICD-10-CM

## 2023-08-11 DIAGNOSIS — Z51.81 MEDICATION MONITORING ENCOUNTER: ICD-10-CM

## 2023-08-11 DIAGNOSIS — E55.9 VITAMIN D DEFICIENCY: ICD-10-CM

## 2023-08-11 DIAGNOSIS — R53.82 CHRONIC FATIGUE: ICD-10-CM

## 2023-08-11 NOTE — PROGRESS NOTES
The ABCs of the Annual Wellness Visit  Subsequent Medicare Wellness Visit    Subjective    Nely Castrejon is a 53 y.o. female who presents for a Subsequent Medicare Wellness Visit.    The following portions of the patient's history were reviewed and   updated as appropriate: allergies, current medications, past family history, past medical history, past social history, past surgical history, and problem list.    Compared to one year ago, the patient feels her physical   health is worse.    Compared to one year ago, the patient feels her mental   health is the same.    Recent Hospitalizations:  She was not admitted to the hospital during the last year.       Current Medical Providers:  Patient Care Team:  Jie Mayo MD as PCP - General (Family Medicine)  Corby Owens MD as Consulting Physician (Rheumatology)  Jm Mills MD as Consulting Physician (Cardiology)  Andrei Raphael MD as Consulting Physician (Cardiac Electrophysiology)    Outpatient Medications Prior to Visit   Medication Sig Dispense Refill    chlorthalidone (HYGROTON) 25 MG tablet Take 0.5 tablets by mouth Daily. 15 tablet 11    Cholecalciferol (VITAMIN D3) 125 MCG (5000 UT) capsule capsule Take 1 capsule by mouth Daily.      Cyanocobalamin (B-12) 1000 MCG capsule take 1 capsule by mouth daily 60 capsule 6    dexlansoprazole (Dexilant) 60 MG capsule Take 1 capsule by mouth Daily. Indications: Gastroesophageal Reflux Disease 90 capsule 3    flecainide (TAMBOCOR) 50 MG tablet Take 1 tablet by mouth 2 (Two) Times a Day. 60 tablet 11    irbesartan (Avapro) 150 MG tablet Take 1 tablet by mouth Every Night. 90 tablet 4    metoprolol succinate XL (TOPROL-XL) 50 MG 24 hr tablet Take 1 tablet by mouth Daily. 30 tablet 11    rivaroxaban (Xarelto) 20 MG tablet Take 1 tablet by mouth Daily With Dinner. 30 tablet 11     No facility-administered medications prior to visit.       No opioid medication identified on active medication list. I have  "reviewed chart for other potential  high risk medication/s and harmful drug interactions in the elderly.        Aspirin is not on active medication list.  Aspirin use is contraindicated for this patient due to: current use of Xarelto.  .    Patient Active Problem List   Diagnosis    Cervical spondylosis    Lumbar radiculopathy    Lumbar spondylosis    Headache, migraine    Myofascial pain syndrome    Pain syndrome, chronic    Paroxysmal hemicrania    History of sick sinus syndrome    Chronic insomnia    Chronic pain syndrome    B12 deficiency    Primary hypertension    GERD (gastroesophageal reflux disease)    Pacemaker    Paroxysmal atrial fibrillation    Subacute pansinusitis    Tachycardia-bradycardia     Advance Care Planning   Advance Care Planning     Advance Directive is not on file.  ACP discussion was declined by the patient. Patient does not have an advance directive, declines further assistance.     Objective    Vitals:    08/11/23 1600   BP: 122/80   BP Location: Left arm   Patient Position: Sitting   Cuff Size: Adult   Pulse: 87   Resp: 16   Temp: 98.4 øF (36.9 øC)   TempSrc: Temporal   SpO2: 98%   Weight: 64.6 kg (142 lb 6.4 oz)   Height: 160 cm (63\")   PainSc:   7   PainLoc: Back     Estimated body mass index is 25.23 kg/mý as calculated from the following:    Height as of this encounter: 160 cm (63\").    Weight as of this encounter: 64.6 kg (142 lb 6.4 oz).    BMI is >= 25 and <30. (Overweight) The following options were offered after discussion;: weight loss educational material (shared in after visit summary)      Does the patient have evidence of cognitive impairment? No          HEALTH RISK ASSESSMENT    Smoking Status:  Social History     Tobacco Use   Smoking Status Never   Smokeless Tobacco Never     Alcohol Consumption:  Social History     Substance and Sexual Activity   Alcohol Use Never     Fall Risk Screen:    STEADI Fall Risk Assessment was completed, and patient is at LOW risk for " falls.Assessment completed on:2023    Depression Screenin/11/2023     3:56 PM   PHQ-2/PHQ-9 Depression Screening   Little Interest or Pleasure in Doing Things 0-->not at all   Feeling Down, Depressed or Hopeless 0-->not at all   PHQ-9: Brief Depression Severity Measure Score 0       Health Habits and Functional and Cognitive Screenin/11/2023     3:54 PM   Functional & Cognitive Status   Do you have difficulty preparing food and eating? No   Do you have difficulty bathing yourself, getting dressed or grooming yourself? No   Do you have difficulty using the toilet? No   Do you have difficulty moving around from place to place? No   Do you have trouble with steps or getting out of a bed or a chair? Yes   Current Diet Well Balanced Diet   Dental Exam Up to date   Eye Exam Up to date   Exercise (times per week) 0 times per week   Current Exercises Include No Regular Exercise   Do you need help using the phone?  No   Are you deaf or do you have serious difficulty hearing?  No   Do you need help to go to places out of walking distance? No   Do you need help shopping? No   Do you need help preparing meals?  No   Do you need help with housework?  No   Do you need help with laundry? No   Do you need help taking your medications? Yes   Do you need help managing money? Yes   Do you ever drive or ride in a car without wearing a seat belt? No   Have you felt unusual stress, anger or loneliness in the last month? No   Who do you live with? Spouse   If you need help, do you have trouble finding someone available to you? No   Have you been bothered in the last four weeks by sexual problems? No   Do you have difficulty concentrating, remembering or making decisions? Yes       Age-appropriate Screening Schedule:  Refer to the list below for future screening recommendations based on patient's age, sex and/or medical conditions. Orders for these recommended tests are listed in the plan section. The patient has  been provided with a written plan.    Health Maintenance   Topic Date Due    MAMMOGRAM  05/07/2022    TDAP/TD VACCINES (1 - Tdap) 08/11/2023 (Originally 3/16/1989)    ZOSTER VACCINE (1 of 2) 01/01/2024 (Originally 3/16/2019)    INFLUENZA VACCINE  10/01/2023    ANNUAL WELLNESS VISIT  08/11/2024    COLORECTAL CANCER SCREENING  01/09/2030    HEPATITIS C SCREENING  Completed    COVID-19 Vaccine  Completed    Pneumococcal Vaccine 0-64  Aged Out                  CMS Preventative Services Quick Reference  Risk Factors Identified During Encounter  None Identified  The above risks/problems have been discussed with the patient.  Pertinent information has been shared with the patient in the After Visit Summary.  An After Visit Summary and PPPS were made available to the patient.    Follow Up:   Next Medicare Wellness visit to be scheduled in 1 year.       Additional E&M Note during same encounter follows:  Patient has multiple medical problems which are significant and separately identifiable that require additional work above and beyond the Medicare Wellness Visit.      Chief Complaint  Medicare Wellness-subsequent (AWV and preventive care ), Rib Pain (Pt states she has had pain under her left breast/ribs into the armpit for the last few months), and Mass (Pt states she has had a knot on the left side of her neck for the last few years but has recently started having pain from it shoot up her neck)    Subjective        Last seen approx 2021. The area of fullness on left upper chest is worsening. She has pain in the left breast area and axilla. Chronic cough. Trouble with swallowing at times. Cough sometimes worse after eating. Very tired. Has to see EP cardiology soon as pacemaker needs replaced she states.    Nely Castrejon is also being seen today for acute issues per chief complaint.         Objective   Vital Signs:  /80 (BP Location: Left arm, Patient Position: Sitting, Cuff Size: Adult)   Pulse 87   Temp 98.4 øF  "(36.9 øC) (Temporal)   Resp 16   Ht 160 cm (63\")   Wt 64.6 kg (142 lb 6.4 oz)   SpO2 98%   BMI 25.23 kg/mý     Physical Exam  Vitals and nursing note reviewed.   Constitutional:       General: She is not in acute distress.     Appearance: Normal appearance. She is well-developed and well-groomed. She is not ill-appearing, toxic-appearing or diaphoretic.   HENT:      Head: Normocephalic and atraumatic.      Right Ear: Hearing normal.      Left Ear: Hearing normal.      Nose: Nose normal.   Eyes:      General: Lids are normal. No scleral icterus.     Extraocular Movements: Extraocular movements intact.   Neck:      Trachea: Phonation normal.   Cardiovascular:      Rate and Rhythm: Normal rate and regular rhythm.   Pulmonary:      Effort: Pulmonary effort is normal.      Breath sounds: Normal breath sounds.   Chest:      Comments: Fullness to the left supraclavicular fossa but no distinct mass appreciated. Pacemaker left anterior chest.  Musculoskeletal:      Cervical back: Neck supple.   Skin:     Coloration: Skin is not jaundiced or pale.   Neurological:      General: No focal deficit present.      Mental Status: She is alert and oriented to person, place, and time.      Motor: Motor function is intact.   Psychiatric:         Attention and Perception: Attention and perception normal.         Mood and Affect: Mood and affect normal.         Speech: Speech normal.         Behavior: Behavior normal. Behavior is cooperative.         Thought Content: Thought content normal.         Cognition and Memory: Cognition and memory normal.         Judgment: Judgment normal.                       Assessment and Plan   Diagnoses and all orders for this visit:    1. Medicare annual wellness visit, subsequent (Primary)    2. Supraclavicular fossa fullness  -     CT Chest With Contrast; Future    3. Breast pain, left  -     Mammo Diagnostic Digital Tomosynthesis Bilateral With CAD; Future    4. Chronic cough  -     CBC (No " Diff)  -     Ambulatory referral for Screening EGD  -     CT Chest With Contrast; Future    5. Esophageal dysphagia  -     Ambulatory referral for Screening EGD    6. Gastroesophageal reflux disease, unspecified whether esophagitis present  -     Vitamin B12 Deficiency Cascade  -     Comprehensive Metabolic Panel  -     Ambulatory referral for Screening EGD    7. Chronic fatigue  -     TSH+Free T4  -     Vitamin D,25-Hydroxy  -     Vitamin B12 Deficiency Cascade  -     CBC (No Diff)  -     Comprehensive Metabolic Panel  -     Folate  -     Vitamin B6    8. Hyperglycemia  -     Hemoglobin A1c  -     Comprehensive Metabolic Panel    9. B12 deficiency  -     Vitamin B12 Deficiency Cascade  -     CBC (No Diff)    10. Vitamin D deficiency  -     Vitamin D,25-Hydroxy    11. Screening, lipid  -     Lipid Panel    12. Medication monitoring encounter  -     Hemoglobin A1c  -     Lipid Panel  -     TSH+Free T4  -     Vitamin D,25-Hydroxy  -     Vitamin B12 Deficiency Cascade  -     CBC (No Diff)  -     Comprehensive Metabolic Panel  -     Folate  -     Vitamin B6    13. Abnormal blood chemistry  -     Hemoglobin A1c  -     Lipid Panel  -     TSH+Free T4  -     Vitamin D,25-Hydroxy  -     Vitamin B12 Deficiency Cascade  -     CBC (No Diff)  -     Comprehensive Metabolic Panel  -     Folate  -     Vitamin B6             Follow Up   Return in about 2 months (around 10/16/2023) for follow up.  Patient was given instructions and counseling regarding her condition or for health maintenance advice. Please see specific information pulled into the AVS if appropriate.

## 2023-08-11 NOTE — PATIENT INSTRUCTIONS
"  Medicare Wellness  Personal Prevention Plan of Service     Date of Office Visit:    Encounter Provider:  Jie Mayo MD  Place of Service:  [facility]  Patient Name: Nely Castrejon  :  1970    As part of the Medicare Wellness portion of your visit today, we are providing you with this personalized preventive plan of services (PPPS). This plan is based upon recommendations of the [place] Preventive Services Task Force (USPSTF) and the Advisory Committee on Immunization Practices (ACIP).    This lists the preventive care services that should be considered, and provides dates of when you are due. Items listed as completed are up-to-date and do not require any further intervention.    Health Maintenance   Topic Date Due    ANNUAL WELLNESS VISIT  10/23/2021    MAMMOGRAM  2022    TDAP/TD VACCINES (1 - Tdap) 2023 (Originally 3/16/1988)    ZOSTER VACCINE (1 of 2) 2024 (Originally 3/16/2019)    INFLUENZA VACCINE  10/01/2023    COLORECTAL CANCER SCREENING  2030    HEPATITIS C SCREENING  Completed    COVID-19 Vaccine  Completed    Pneumococcal Vaccine 0-64  Aged Out       Orders Placed This Encounter   Procedures    Mammo Diagnostic Digital Tomosynthesis Bilateral With CAD     Standing Status:   Future     Standing Expiration Date:   2024     Order Specific Question:   Is an Ultrasound Breast required?  If 'Yes\", Please enter an order for the US Breast as well.     Answer:   Prn     Order Specific Question:   Reason for Exam:     Answer:   left breast pain     Order Specific Question:   Does this patient have a diabetic monitoring/medication delivering device on?     Answer:   No     Order Specific Question:   Release to patient     Answer:   Routine Release [[id number]]    Hemoglobin A1c     Order Specific Question:   Release to patient     Answer:   Routine Release [[id number]]    Lipid Panel     Order Specific Question:   [facility] Has the " patient fasted?     Answer:   Yes     Order Specific Question:   Release to patient     Answer:   Routine Release [[id number]]    TSH+Free T4     Order Specific Question:   Release to patient     Answer:   Routine Release [[id number]]    Vitamin D,25-Hydroxy     Order Specific Question:   Release to patient     Answer:   Routine Release [[id number]]    Vitamin B12 Deficiency Cascade     Order Specific Question:   Release to patient     Answer:   Routine Release [[id number]]    CBC (No Diff)     Order Specific Question:   Release to patient     Answer:   Routine Release [[id number]]    Comprehensive Metabolic Panel     Order Specific Question:   Release to patient     Answer:   Routine Release [[id number]]    Folate     Order Specific Question:   Release to patient     Answer:   Routine Release [[id number]]    Vitamin B6     Order Specific Question:   Release to patient     Answer:   Routine Release [[id number]]    Ambulatory referral for Screening EGD     Referral Priority:   Routine     Referral Type:   Diagnostic Medical     Referral Reason:   Specialty Services Required     Number of Visits Requested:   1       Return in about 2 months (around 10/16/2023) for follow up.        Health Maintenance for Postmenopausal Women  Menopause is a normal process in which your reproductive ability comes to an end. This process happens gradually over a span of months to years, usually between the ages of 48 and 55. Menopause is complete when you have missed 12 consecutive menstrual periods.  It is important to talk with your health care provider about some of the most common conditions that affect postmenopausal women, such as heart disease, cancer, and bone loss (osteoporosis). Adopting a healthy lifestyle and getting preventive care can help to promote your health and wellness. Those actions can also lower your chances of developing some of these common conditions.  What should I know about menopause?  During menopause, you  may experience a number of symptoms, such as:  Moderate-to-severe hot flashes.  Night sweats.  Decrease in sex drive.  Mood swings.  Headaches.  Tiredness.  Irritability.  Memory problems.  Insomnia.     Choosing to treat or not to treat menopausal changes is an individual decision that you make with your health care provider.  What should I know about hormone replacement therapy and supplements?  Hormone therapy products are effective for treating symptoms that are associated with menopause, such as hot flashes and night sweats. Hormone replacement carries certain risks, especially as you become older. If you are thinking about using estrogen or estrogen with progestin treatments, discuss the benefits and risks with your health care provider.  What should I know about heart disease and stroke?  Heart disease, heart attack, and stroke become more likely as you age. This may be due, in part, to the hormonal changes that your body experiences during menopause. These can affect how your body processes dietary fats, triglycerides, and cholesterol. Heart attack and stroke are both medical emergencies.    There are many things that you can do to help prevent heart disease and stroke:  Have your blood pressure checked at least every 1-2 years. High blood pressure causes heart disease and increases the risk of stroke.  If you are 55-79 years old, ask your health care provider if you should take aspirin to prevent a heart attack or a stroke.  Do not use any tobacco products, including cigarettes, chewing tobacco, or electronic cigarettes. If you need help quitting, ask your health care provider.  It is important to eat a healthy diet and maintain a healthy weight.  Be sure to include plenty of vegetables, fruits, low-fat dairy products, and lean protein.  Avoid eating foods that are high in solid fats, added sugars, or salt (sodium).  Get regular exercise. This is one of the most important things that you can do for your  health.  Try to exercise for at least 150 minutes each week. The type of exercise that you do should increase your heart rate and make you sweat. This is known as moderate-intensity exercise.  Try to do strengthening exercises at least twice each week. Do these in addition to the moderate-intensity exercise.  Know your numbers. Ask your health care provider to check your cholesterol and your blood glucose. Continue to have your blood tested as directed by your health care provider.     What should I know about cancer screening?  There are several types of cancer. Take the following steps to reduce your risk and to catch any cancer development as early as possible.  Breast Cancer  Practice breast self-awareness.  This means understanding how your breasts normally appear and feel.  It also means doing regular breast self-exams. Let your health care provider know about any changes, no matter how small.  If you are 40 or older, have a clinician do a breast exam (clinical breast exam or CBE) every year. Depending on your age, family history, and medical history, it may be recommended that you also have a yearly breast X-ray (mammogram).  If you have a family history of breast cancer, talk with your health care provider about genetic screening.  If you are at high risk for breast cancer, talk with your health care provider about having an MRI and a mammogram every year.  Breast cancer (BRCA) gene test is recommended for women who have family members with BRCA-related cancers. Results of the assessment will determine the need for genetic counseling and BRCA1 and for BRCA2 testing. BRCA-related cancers include these types:  Breast. This occurs in males or females.  Ovarian.  Tubal. This may also be called fallopian tube cancer.  Cancer of the abdominal or pelvic lining (peritoneal cancer).  Prostate.  Pancreatic.     Cervical, Uterine, and Ovarian Cancer  Your health care provider may recommend that you be screened  regularly for cancer of the pelvic organs. These include your ovaries, uterus, and vagina. This screening involves a pelvic exam, which includes checking for microscopic changes to the surface of your cervix (Pap test).  For women ages 21-65, health care providers may recommend a pelvic exam and a Pap test every three years. For women ages 30-65, they may recommend the Pap test and pelvic exam, combined with testing for human papilloma virus (HPV), every five years. Some types of HPV increase your risk of cervical cancer. Testing for HPV may also be done on women of any age who have unclear Pap test results.  Other health care providers may not recommend any screening for nonpregnant women who are considered low risk for pelvic cancer and have no symptoms. Ask your health care provider if a screening pelvic exam is right for you.  If you have had past treatment for cervical cancer or a condition that could lead to cancer, you need Pap tests and screening for cancer for at least 20 years after your treatment. If Pap tests have been discontinued for you, your risk factors (such as having a new sexual partner) need to be reassessed to determine if you should start having screenings again. Some women have medical problems that increase the chance of getting cervical cancer. In these cases, your health care provider may recommend that you have screening and Pap tests more often.  If you have a family history of uterine cancer or ovarian cancer, talk with your health care provider about genetic screening.  If you have vaginal bleeding after reaching menopause, tell your health care provider.  There are currently no reliable tests available to screen for ovarian cancer.     Lung Cancer  Lung cancer screening is recommended for adults 55-80 years old who are at high risk for lung cancer because of a history of smoking. A yearly low-dose CT scan of the lungs is recommended if you:  Currently smoke.  Have a history of at  least 30 pack-years of smoking and you currently smoke or have quit within the past 15 years. A pack-year is smoking an average of one pack of cigarettes per day for one year.     Yearly screening should:  Continue until it has been 15 years since you quit.  Stop if you develop a health problem that would prevent you from having lung cancer treatment.     Colorectal Cancer  This type of cancer can be detected and can often be prevented.  Routine colorectal cancer screening usually begins at age 50 and continues through age 75.  If you have risk factors for colon cancer, your health care provider may recommend that you be screened at an earlier age.  If you have a family history of colorectal cancer, talk with your health care provider about genetic screening.  Your health care provider may also recommend using home test kits to check for hidden blood in your stool.  A small camera at the end of a tube can be used to examine your colon directly (sigmoidoscopy or colonoscopy). This is done to check for the earliest forms of colorectal cancer.  Direct examination of the colon should be repeated every 5-10 years until age 75. However, if early forms of precancerous polyps or small growths are found or if you have a family history or genetic risk for colorectal cancer, you may need to be screened more often.     Skin Cancer  Check your skin from head to toe regularly.  Monitor any moles. Be sure to tell your health care provider:  About any new moles or changes in moles, especially if there is a change in a mole's shape or color.  If you have a mole that is larger than the size of a pencil eraser.  If any of your family members has a history of skin cancer, especially at a young age, talk with your health care provider about genetic screening.  Always use sunscreen. Apply sunscreen liberally and repeatedly throughout the day.  Whenever you are outside, protect yourself by wearing long sleeves, pants, a wide-brimmed hat,  and sunglasses.     What should I know about osteoporosis?  Osteoporosis is a condition in which bone destruction happens more quickly than new bone creation. After menopause, you may be at an increased risk for osteoporosis. To help prevent osteoporosis or the bone fractures that can happen because of osteoporosis, the following is recommended:  If you are 19-50 years old, get at least 1,000 mg of calcium and at least 600 mg of vitamin D per day.  If you are older than age 50 but younger than age 70, get at least 1,200 mg of calcium and at least 600 mg of vitamin D per day.  If you are older than age 70, get at least 1,200 mg of calcium and at least 800 mg of vitamin D per day.     Smoking and excessive alcohol intake increase the risk of osteoporosis. Eat foods that are rich in calcium and vitamin D, and do weight-bearing exercises several times each week as directed by your health care provider.  What should I know about how menopause affects my mental health?  Depression may occur at any age, but it is more common as you become older. Common symptoms of depression include:  Low or sad mood.  Changes in sleep patterns.  Changes in appetite or eating patterns.  Feeling an overall lack of motivation or enjoyment of activities that you previously enjoyed.  Frequent crying spells.     Talk with your health care provider if you think that you are experiencing depression.  What should I know about immunizations?  It is important that you get and maintain your immunizations. These include:  Tetanus, diphtheria, and pertussis (Tdap) booster vaccine.  Influenza every year before the flu season begins.  Pneumonia vaccine.  Shingles vaccine.     Your health care provider may also recommend other immunizations.  This information is not intended to replace advice given to you by your health care provider. Make sure you discuss any questions you have with your health care provider.  Document Released: 02/09/2007 Document  Revised: 07/07/2017 Document Reviewed: 09/20/2016  [employer] Patient Education c 2018 [employer]

## 2023-08-15 LAB
25(OH)D3+25(OH)D2 SERPL-MCNC: 103 NG/ML (ref 30–100)
ALBUMIN SERPL-MCNC: 4.7 G/DL (ref 3.8–4.9)
ALBUMIN/GLOB SERPL: 2.1 {RATIO} (ref 1.2–2.2)
ALP SERPL-CCNC: 100 IU/L (ref 44–121)
ALT SERPL-CCNC: 12 IU/L (ref 0–32)
AST SERPL-CCNC: 16 IU/L (ref 0–40)
BILIRUB SERPL-MCNC: 0.3 MG/DL (ref 0–1.2)
BUN SERPL-MCNC: 13 MG/DL (ref 6–24)
BUN/CREAT SERPL: 16 (ref 9–23)
CALCIUM SERPL-MCNC: 9.5 MG/DL (ref 8.7–10.2)
CHLORIDE SERPL-SCNC: 99 MMOL/L (ref 96–106)
CHOLEST SERPL-MCNC: 219 MG/DL (ref 100–199)
CO2 SERPL-SCNC: 22 MMOL/L (ref 20–29)
CREAT SERPL-MCNC: 0.81 MG/DL (ref 0.57–1)
EGFRCR SERPLBLD CKD-EPI 2021: 86 ML/MIN/1.73
ERYTHROCYTE [DISTWIDTH] IN BLOOD BY AUTOMATED COUNT: 12.5 % (ref 11.7–15.4)
FOLATE SERPL-MCNC: 9.1 NG/ML
GLOBULIN SER CALC-MCNC: 2.2 G/DL (ref 1.5–4.5)
GLUCOSE SERPL-MCNC: 91 MG/DL (ref 70–99)
HBA1C MFR BLD: 5.7 % (ref 4.8–5.6)
HCT VFR BLD AUTO: 41 % (ref 34–46.6)
HDLC SERPL-MCNC: 79 MG/DL
HGB BLD-MCNC: 14 G/DL (ref 11.1–15.9)
INTERPRETATION: NORMAL
LDLC SERPL CALC-MCNC: 123 MG/DL (ref 0–99)
MCH RBC QN AUTO: 29.4 PG (ref 26.6–33)
MCHC RBC AUTO-ENTMCNC: 34.1 G/DL (ref 31.5–35.7)
MCV RBC AUTO: 86 FL (ref 79–97)
PLATELET # BLD AUTO: 210 X10E3/UL (ref 150–450)
POTASSIUM SERPL-SCNC: 4.3 MMOL/L (ref 3.5–5.2)
PROT SERPL-MCNC: 6.9 G/DL (ref 6–8.5)
PYRIDOXAL PHOS SERPL-MCNC: 4.7 UG/L (ref 3.4–65.2)
RBC # BLD AUTO: 4.76 X10E6/UL (ref 3.77–5.28)
SODIUM SERPL-SCNC: 138 MMOL/L (ref 134–144)
T4 FREE SERPL-MCNC: 1.04 NG/DL (ref 0.82–1.77)
TRIGL SERPL-MCNC: 99 MG/DL (ref 0–149)
TSH SERPL DL<=0.005 MIU/L-ACNC: 2.28 UIU/ML (ref 0.45–4.5)
VIT B12 SERPL-MCNC: 845 PG/ML (ref 232–1245)
VLDLC SERPL CALC-MCNC: 17 MG/DL (ref 5–40)
WBC # BLD AUTO: 7.4 X10E3/UL (ref 3.4–10.8)

## 2023-08-16 ENCOUNTER — TELEPHONE (OUTPATIENT)
Dept: INTERNAL MEDICINE | Facility: CLINIC | Age: 53
End: 2023-08-16
Payer: MEDICARE

## 2023-08-16 DIAGNOSIS — E67.3 VITAMIN D INTOXICATION: Primary | ICD-10-CM

## 2023-08-16 DIAGNOSIS — K21.9 GASTROESOPHAGEAL REFLUX DISEASE, UNSPECIFIED WHETHER ESOPHAGITIS PRESENT: ICD-10-CM

## 2023-08-16 RX ORDER — DEXLANSOPRAZOLE 60 MG/1
60 CAPSULE, DELAYED RELEASE ORAL DAILY
Qty: 90 CAPSULE | Refills: 3 | Status: SHIPPED | OUTPATIENT
Start: 2023-08-16

## 2023-08-16 NOTE — TELEPHONE ENCOUNTER
Called Nely and she verbally confirmed she would come back in  about 3-4 weeks to recheck. Also requested refill. Done.

## 2023-08-16 NOTE — TELEPHONE ENCOUNTER
Please ask her to swing by lab in 3-4 weeks to recheck vitamin D to ensure it's coming down. Order in and fasting not required.

## 2023-08-16 NOTE — TELEPHONE ENCOUNTER
----- Message from Starr Andrews CMA sent at 8/16/2023  4:23 PM EDT -----  Notified patient of results. FYI-She hasn't had any vitamin d in a month or so.

## 2023-08-17 ENCOUNTER — OFFICE VISIT (OUTPATIENT)
Dept: SURGERY | Facility: CLINIC | Age: 53
End: 2023-08-17
Payer: MEDICARE

## 2023-08-17 VITALS
RESPIRATION RATE: 18 BRPM | OXYGEN SATURATION: 98 % | TEMPERATURE: 98.6 F | HEIGHT: 63 IN | SYSTOLIC BLOOD PRESSURE: 122 MMHG | WEIGHT: 140 LBS | HEART RATE: 68 BPM | DIASTOLIC BLOOD PRESSURE: 68 MMHG | BODY MASS INDEX: 24.8 KG/M2

## 2023-08-17 DIAGNOSIS — K21.9 GASTROESOPHAGEAL REFLUX DISEASE WITHOUT ESOPHAGITIS: Primary | ICD-10-CM

## 2023-08-17 DIAGNOSIS — R05.3 CHRONIC COUGH: ICD-10-CM

## 2023-08-17 DIAGNOSIS — R13.10 DYSPHAGIA, UNSPECIFIED TYPE: ICD-10-CM

## 2023-08-17 PROCEDURE — 3078F DIAST BP <80 MM HG: CPT | Performed by: SURGERY

## 2023-08-17 PROCEDURE — 3074F SYST BP LT 130 MM HG: CPT | Performed by: SURGERY

## 2023-08-17 PROCEDURE — 1160F RVW MEDS BY RX/DR IN RCRD: CPT | Performed by: SURGERY

## 2023-08-17 PROCEDURE — 99203 OFFICE O/P NEW LOW 30 MIN: CPT | Performed by: SURGERY

## 2023-08-17 PROCEDURE — 1159F MED LIST DOCD IN RCRD: CPT | Performed by: SURGERY

## 2023-08-17 NOTE — PROGRESS NOTES
Subjective   Nely Castrejon is a 53 y.o. female.   Chief Complaint   Patient presents with    Difficulty Swallowing      History of Present Illness   Ms. Castrejon comes to the office today for evaluation of difficulty swallowing.  She reports that this has been an issue for the last one year and is worsening.  She complains of a chronic globus sensation.  She complains of frequent throat clearing and a chronic cough.  She has frequent choking spells.  Symptoms occur with both solids and liquids.    The following portions of the patient's history were reviewed and updated as appropriate: allergies, current medications, past family history, past medical history, past social history, past surgical history, and problem list.      Patient Active Problem List   Diagnosis    Cervical spondylosis    Lumbar radiculopathy    Lumbar spondylosis    Headache, migraine    Myofascial pain syndrome    Pain syndrome, chronic    Paroxysmal hemicrania    History of sick sinus syndrome    Chronic insomnia    Chronic pain syndrome    B12 deficiency    Primary hypertension    GERD (gastroesophageal reflux disease)    Pacemaker    Paroxysmal atrial fibrillation    Subacute pansinusitis    Tachycardia-bradycardia    Dysphagia       Past Medical History:   Diagnosis Date    Arthritis     Fibromyalgia, primary     GERD (gastroesophageal reflux disease)     History of osteoarthritis 08/01/2016    Hypertension     Primary stabbing headache     Sick sinus syndrome     Vitamin B12 deficiency        Past Surgical History:   Procedure Laterality Date    CHOLECYSTECTOMY      COLONOSCOPY  01/09/2020    [facility] - Dr. Katie Melgar - colon tortuous but otherwise normal. 10 year follow up screening recommended.    ENDOSCOPY      OOPHORECTOMY      PACEMAKER IMPLANTATION      SHOULDER SURGERY      left    SUBTOTAL HYSTERECTOMY      Partial        Medications:   No current facility-administered medications on file prior to visit.  "    Current Outpatient Medications on File Prior to Visit   Medication Sig    chlorthalidone (HYGROTON) 25 MG tablet Take 0.5 tablets by mouth Daily.    Cyanocobalamin (B-12) 1000 MCG capsule take 1 capsule by mouth daily (Patient taking differently: Take 1 capsule by mouth Daily.)    dexlansoprazole (Dexilant) 60 MG capsule Take 1 capsule by mouth Daily. Indications: Gastroesophageal Reflux Disease    flecainide (TAMBOCOR) 50 MG tablet Take 1 tablet by mouth 2 (Two) Times a Day.    irbesartan (Avapro) 150 MG tablet Take 1 tablet by mouth Every Night.    metoprolol succinate XL (TOPROL-XL) 50 MG 24 hr tablet Take 1 tablet by mouth Daily. (Patient not taking: Reported on 8/24/2023)    rivaroxaban (Xarelto) 20 MG tablet Take 1 tablet by mouth Daily With Dinner.       Allergies:   No Known Allergies      Family History   Problem Relation Age of Onset    Arthritis Maternal Grandmother     Hypertension Maternal Grandmother     Kidney disease Maternal Grandmother     Breast cancer Neg Hx        Social History     Socioeconomic History    Marital status:    Tobacco Use    Smoking status: Never    Smokeless tobacco: Never   Vaping Use    Vaping Use: Never used   Substance and Sexual Activity    Alcohol use: Never    Drug use: Never    Sexual activity: Defer       Review of Systems   Constitutional: Negative.    HENT: Negative.     Eyes: Negative.    Respiratory:  Positive for cough.    Cardiovascular: Negative.    Gastrointestinal: Negative.         See HPI   Endocrine: Negative.    Genitourinary: Negative.    Musculoskeletal: Negative.    Skin: Negative.    Allergic/Immunologic: Negative.    Neurological: Negative.    Hematological: Negative.    Psychiatric/Behavioral: Negative.       Objective   /68   Pulse 68   Temp 98.6 øF (37 øC) (Temporal)   Resp 18   Ht 160 cm (63\")   Wt 63.5 kg (140 lb)   SpO2 98%   BMI 24.80 kg/mý     Physical Exam  Constitutional:       Appearance: She is well-developed. "   HENT:      Head: Normocephalic and atraumatic.   Eyes:      General: No scleral icterus.  Neck:      Thyroid: No thyromegaly.   Cardiovascular:      Rate and Rhythm: Regular rhythm.   Pulmonary:      Effort: Pulmonary effort is normal.   Abdominal:      General: There is no distension.      Palpations: Abdomen is soft.      Tenderness: There is no abdominal tenderness.   Musculoskeletal:      Cervical back: Neck supple. No tenderness.   Lymphadenopathy:      Cervical: No cervical adenopathy.   Skin:     General: Skin is warm and dry.   Neurological:      Mental Status: She is alert and oriented to person, place, and time.   Psychiatric:         Behavior: Behavior normal.         Assessment & Plan   Diagnoses and all orders for this visit:    1. Gastroesophageal reflux disease without esophagitis (Primary)    2. Dysphagia, unspecified type    3. Chronic cough        We discussed EGD for diagnostic purposes to investigate dysphagia.  We discussed the possibility of esophageal dilation.  We discussed the indications for upper endoscopy as well as the risks, benefits and alternatives to this procedure.   The patient was given an opportunity to ask questions.  The patient verbalized understanding of these recommendations and the plan of care. The patient is willing to proceed with endoscopy and has signed informed consent in the office today.  My office will arrange scheduling for the EGD procedure and pre-admission testing.

## 2023-08-17 NOTE — H&P (VIEW-ONLY)
Subjective   Nely Castrejon is a 53 y.o. female.   Chief Complaint   Patient presents with    Difficulty Swallowing      History of Present Illness   Ms. Castrejon comes to the office today for evaluation of difficulty swallowing.  She reports that this has been an issue for the last one year and is worsening.  She complains of a chronic globus sensation.  She complains of frequent throat clearing and a chronic cough.  She has frequent choking spells.  Symptoms occur with both solids and liquids.    The following portions of the patient's history were reviewed and updated as appropriate: allergies, current medications, past family history, past medical history, past social history, past surgical history, and problem list.      Patient Active Problem List   Diagnosis    Cervical spondylosis    Lumbar radiculopathy    Lumbar spondylosis    Headache, migraine    Myofascial pain syndrome    Pain syndrome, chronic    Paroxysmal hemicrania    History of sick sinus syndrome    Chronic insomnia    Chronic pain syndrome    B12 deficiency    Primary hypertension    GERD (gastroesophageal reflux disease)    Pacemaker    Paroxysmal atrial fibrillation    Subacute pansinusitis    Tachycardia-bradycardia    Dysphagia       Past Medical History:   Diagnosis Date    Arthritis     Fibromyalgia, primary     GERD (gastroesophageal reflux disease)     History of osteoarthritis 08/01/2016    Hypertension     Primary stabbing headache     Sick sinus syndrome     Vitamin B12 deficiency        Past Surgical History:   Procedure Laterality Date    CHOLECYSTECTOMY      COLONOSCOPY  01/09/2020    [facility] - Dr. Katie Melgar - colon tortuous but otherwise normal. 10 year follow up screening recommended.    ENDOSCOPY      OOPHORECTOMY      PACEMAKER IMPLANTATION      SHOULDER SURGERY      left    SUBTOTAL HYSTERECTOMY      Partial        Medications:   No current facility-administered medications on file prior to visit.  "    Current Outpatient Medications on File Prior to Visit   Medication Sig    chlorthalidone (HYGROTON) 25 MG tablet Take 0.5 tablets by mouth Daily.    Cyanocobalamin (B-12) 1000 MCG capsule take 1 capsule by mouth daily (Patient taking differently: Take 1 capsule by mouth Daily.)    dexlansoprazole (Dexilant) 60 MG capsule Take 1 capsule by mouth Daily. Indications: Gastroesophageal Reflux Disease    flecainide (TAMBOCOR) 50 MG tablet Take 1 tablet by mouth 2 (Two) Times a Day.    irbesartan (Avapro) 150 MG tablet Take 1 tablet by mouth Every Night.    metoprolol succinate XL (TOPROL-XL) 50 MG 24 hr tablet Take 1 tablet by mouth Daily. (Patient not taking: Reported on 8/24/2023)    rivaroxaban (Xarelto) 20 MG tablet Take 1 tablet by mouth Daily With Dinner.       Allergies:   No Known Allergies      Family History   Problem Relation Age of Onset    Arthritis Maternal Grandmother     Hypertension Maternal Grandmother     Kidney disease Maternal Grandmother     Breast cancer Neg Hx        Social History     Socioeconomic History    Marital status:    Tobacco Use    Smoking status: Never    Smokeless tobacco: Never   Vaping Use    Vaping Use: Never used   Substance and Sexual Activity    Alcohol use: Never    Drug use: Never    Sexual activity: Defer       Review of Systems   Constitutional: Negative.    HENT: Negative.     Eyes: Negative.    Respiratory:  Positive for cough.    Cardiovascular: Negative.    Gastrointestinal: Negative.         See HPI   Endocrine: Negative.    Genitourinary: Negative.    Musculoskeletal: Negative.    Skin: Negative.    Allergic/Immunologic: Negative.    Neurological: Negative.    Hematological: Negative.    Psychiatric/Behavioral: Negative.       Objective   /68   Pulse 68   Temp 98.6 °F (37 °C) (Temporal)   Resp 18   Ht 160 cm (63\")   Wt 63.5 kg (140 lb)   SpO2 98%   BMI 24.80 kg/m²     Physical Exam  Constitutional:       Appearance: She is well-developed. "   HENT:      Head: Normocephalic and atraumatic.   Eyes:      General: No scleral icterus.  Neck:      Thyroid: No thyromegaly.   Cardiovascular:      Rate and Rhythm: Regular rhythm.   Pulmonary:      Effort: Pulmonary effort is normal.   Abdominal:      General: There is no distension.      Palpations: Abdomen is soft.      Tenderness: There is no abdominal tenderness.   Musculoskeletal:      Cervical back: Neck supple. No tenderness.   Lymphadenopathy:      Cervical: No cervical adenopathy.   Skin:     General: Skin is warm and dry.   Neurological:      Mental Status: She is alert and oriented to person, place, and time.   Psychiatric:         Behavior: Behavior normal.         Assessment & Plan   Diagnoses and all orders for this visit:    1. Gastroesophageal reflux disease without esophagitis (Primary)    2. Dysphagia, unspecified type    3. Chronic cough        We discussed EGD for diagnostic purposes to investigate dysphagia.  We discussed the possibility of esophageal dilation.  We discussed the indications for upper endoscopy as well as the risks, benefits and alternatives to this procedure.   The patient was given an opportunity to ask questions.  The patient verbalized understanding of these recommendations and the plan of care. The patient is willing to proceed with endoscopy and has signed informed consent in the office today.  My office will arrange scheduling for the EGD procedure and pre-admission testing.

## 2023-08-23 ENCOUNTER — PREP FOR SURGERY (OUTPATIENT)
Dept: OTHER | Facility: HOSPITAL | Age: 53
End: 2023-08-23
Payer: MEDICARE

## 2023-08-23 ENCOUNTER — TELEPHONE (OUTPATIENT)
Dept: SURGERY | Facility: CLINIC | Age: 53
End: 2023-08-23
Payer: MEDICARE

## 2023-08-23 DIAGNOSIS — R13.10 DYSPHAGIA, UNSPECIFIED TYPE: ICD-10-CM

## 2023-08-23 DIAGNOSIS — K21.9 GASTROESOPHAGEAL REFLUX DISEASE WITHOUT ESOPHAGITIS: Primary | ICD-10-CM

## 2023-08-23 RX ORDER — SODIUM CHLORIDE 0.9 % (FLUSH) 0.9 %
3 SYRINGE (ML) INJECTION EVERY 12 HOURS SCHEDULED
Status: CANCELLED | OUTPATIENT
Start: 2023-08-23

## 2023-08-23 RX ORDER — SODIUM CHLORIDE 0.9 % (FLUSH) 0.9 %
10 SYRINGE (ML) INJECTION AS NEEDED
Status: CANCELLED | OUTPATIENT
Start: 2023-08-23

## 2023-08-23 RX ORDER — SODIUM CHLORIDE 9 MG/ML
40 INJECTION, SOLUTION INTRAVENOUS AS NEEDED
Status: CANCELLED | OUTPATIENT
Start: 2023-08-23

## 2023-08-23 RX ORDER — SODIUM CHLORIDE, SODIUM LACTATE, POTASSIUM CHLORIDE, CALCIUM CHLORIDE 600; 310; 30; 20 MG/100ML; MG/100ML; MG/100ML; MG/100ML
50 INJECTION, SOLUTION INTRAVENOUS CONTINUOUS
Status: CANCELLED | OUTPATIENT
Start: 2023-08-23

## 2023-08-24 NOTE — PRE-PROCEDURE INSTRUCTIONS
PAT phone history completed with pt for upcoming procedure on 8/25/23, with Dr Rowe.     PAT PASS GIVEN/REVIEWED WITH PT.  VERBALIZED UNDERSTANDING OF THE FOLLOWING:  DO NOT EAT, DRINK, SMOKE, USE SMOKELESS TOBACCO OR CHEW GUM AFTER MIDNIGHT THE NIGHT BEFORE SURGERY.  THIS ALSO INCLUDES HARD CANDIES AND MINTS.    DO NOT SHAVE THE AREA TO BE OPERATED ON AT LEAST 48 HOURS PRIOR TO THE PROCEDURE.  DO NOT WEAR MAKE UP OR NAIL POLISH.  DO NOT LEAVE IN ANY PIERCING OR WEAR JEWELRY THE DAY OF SURGERY.      DO NOT USE ADHESIVES IF YOU WEAR DENTURES.    DO NOT WEAR EYE CONTACTS; BRING IN YOUR GLASSES.    ONLY TAKE MEDICATION THE MORNING OF YOUR PROCEDURE IF INSTRUCTED BY YOUR SURGEON WITH ENOUGH WATER TO SWALLOW THE MEDICATION.  IF YOUR SURGEON DID NOT SPECIFY WHICH MEDICATIONS TO TAKE, YOU WILL NEED TO CALL THEIR OFFICE FOR FURTHER INSTRUCTIONS AND DO AS THEY INSTRUCT.    LEAVE ANYTHING YOU CONSIDER VALUABLE AT HOME.    YOU WILL NEED TO ARRANGE FOR SOMEONE TO DRIVE YOU HOME AFTER SURGERY.  IT IS RECOMMENDED THAT YOU DO NOT DRIVE, WORK, DRINK ALCOHOL OR MAKE MAJOR DECISIONS FOR AT LEAST 24 HOURS AFTER YOUR PROCEDURE IS COMPLETE.      THE DAY OF YOUR PROCEDURE, BRING IN THE FOLLOWING IF APPLICABLE:   PICTURE ID AND INSURANCE/MEDICARE OR MEDICAID CARDS/ANY CO-PAY THAT MAY BE DUE   COPY OF ADVANCED DIRECTIVE/LIVING WILL/POWER OR    CPAP/BIPAP/INHALERS   SKIN PREP SHEET   YOUR PREADMISSION TESTING PASS (IF NOT A PHONE HISTORY)    Medication instructions given to pt by RN per anesthesia protocol.  Pt referred back to surgeon for further instructions if he/she is on any blood thinners.           
No

## 2023-08-28 ENCOUNTER — ANESTHESIA (OUTPATIENT)
Dept: GASTROENTEROLOGY | Facility: HOSPITAL | Age: 53
End: 2023-08-28
Payer: MEDICARE

## 2023-08-28 ENCOUNTER — HOSPITAL ENCOUNTER (OUTPATIENT)
Facility: HOSPITAL | Age: 53
Setting detail: HOSPITAL OUTPATIENT SURGERY
Discharge: HOME | End: 2023-08-28
Attending: SURGERY | Admitting: SURGERY
Payer: MEDICARE

## 2023-08-28 ENCOUNTER — ANESTHESIA EVENT (OUTPATIENT)
Dept: GASTROENTEROLOGY | Facility: HOSPITAL | Age: 53
End: 2023-08-28
Payer: MEDICARE

## 2023-08-28 VITALS
RESPIRATION RATE: 16 BRPM | WEIGHT: 140 LBS | HEIGHT: 63 IN | HEART RATE: 76 BPM | OXYGEN SATURATION: 98 % | BODY MASS INDEX: 24.8 KG/M2 | SYSTOLIC BLOOD PRESSURE: 118 MMHG | DIASTOLIC BLOOD PRESSURE: 76 MMHG | TEMPERATURE: 97.3 F

## 2023-08-28 DIAGNOSIS — K21.9 GASTROESOPHAGEAL REFLUX DISEASE WITHOUT ESOPHAGITIS: ICD-10-CM

## 2023-08-28 DIAGNOSIS — R13.10 DYSPHAGIA, UNSPECIFIED TYPE: ICD-10-CM

## 2023-08-28 PROCEDURE — 88305 TISSUE EXAM BY PATHOLOGIST: CPT

## 2023-08-28 PROCEDURE — C1726 CATH, BAL DIL, NON-VASCULAR: HCPCS | Performed by: SURGERY

## 2023-08-28 PROCEDURE — 25010000002 PROPOFOL 200 MG/20ML EMULSION: Performed by: NURSE ANESTHETIST, CERTIFIED REGISTERED

## 2023-08-28 PROCEDURE — 25010000002 ONDANSETRON PER 1 MG: Performed by: NURSE ANESTHETIST, CERTIFIED REGISTERED

## 2023-08-28 RX ORDER — LIDOCAINE HYDROCHLORIDE 20 MG/ML
INJECTION, SOLUTION INTRAVENOUS AS NEEDED
Status: DISCONTINUED | OUTPATIENT
Start: 2023-08-28 | End: 2023-08-28 | Stop reason: SURG

## 2023-08-28 RX ORDER — SODIUM CHLORIDE 0.9 % (FLUSH) 0.9 %
3 SYRINGE (ML) INJECTION EVERY 12 HOURS SCHEDULED
Status: DISCONTINUED | OUTPATIENT
Start: 2023-08-28 | End: 2023-08-28 | Stop reason: HOSPADM

## 2023-08-28 RX ORDER — SODIUM CHLORIDE 0.9 % (FLUSH) 0.9 %
10 SYRINGE (ML) INJECTION AS NEEDED
Status: DISCONTINUED | OUTPATIENT
Start: 2023-08-28 | End: 2023-08-28 | Stop reason: HOSPADM

## 2023-08-28 RX ORDER — PROPOFOL 10 MG/ML
INJECTION, EMULSION INTRAVENOUS AS NEEDED
Status: DISCONTINUED | OUTPATIENT
Start: 2023-08-28 | End: 2023-08-28 | Stop reason: SURG

## 2023-08-28 RX ORDER — ONDANSETRON 2 MG/ML
INJECTION INTRAMUSCULAR; INTRAVENOUS AS NEEDED
Status: DISCONTINUED | OUTPATIENT
Start: 2023-08-28 | End: 2023-08-28 | Stop reason: SURG

## 2023-08-28 RX ORDER — SODIUM CHLORIDE 9 MG/ML
40 INJECTION, SOLUTION INTRAVENOUS AS NEEDED
Status: DISCONTINUED | OUTPATIENT
Start: 2023-08-28 | End: 2023-08-28 | Stop reason: HOSPADM

## 2023-08-28 RX ORDER — SODIUM CHLORIDE, SODIUM LACTATE, POTASSIUM CHLORIDE, CALCIUM CHLORIDE 600; 310; 30; 20 MG/100ML; MG/100ML; MG/100ML; MG/100ML
50 INJECTION, SOLUTION INTRAVENOUS CONTINUOUS
Status: DISCONTINUED | OUTPATIENT
Start: 2023-08-28 | End: 2023-08-28 | Stop reason: HOSPADM

## 2023-08-28 RX ADMIN — SODIUM CHLORIDE, POTASSIUM CHLORIDE, SODIUM LACTATE AND CALCIUM CHLORIDE 50 ML/HR: 600; 310; 30; 20 INJECTION, SOLUTION INTRAVENOUS at 10:28

## 2023-08-28 RX ADMIN — PROPOFOL 50 MG: 10 INJECTION, EMULSION INTRAVENOUS at 11:00

## 2023-08-28 RX ADMIN — PROPOFOL 100 MG: 10 INJECTION, EMULSION INTRAVENOUS at 10:58

## 2023-08-28 RX ADMIN — PROPOFOL 50 MG: 10 INJECTION, EMULSION INTRAVENOUS at 11:03

## 2023-08-28 RX ADMIN — LIDOCAINE HYDROCHLORIDE 50 MG: 20 INJECTION, SOLUTION INTRAVENOUS at 10:58

## 2023-08-28 RX ADMIN — ONDANSETRON 4 MG: 2 INJECTION INTRAMUSCULAR; INTRAVENOUS at 11:12

## 2023-08-28 NOTE — ANESTHESIA PREPROCEDURE EVALUATION
Anesthesia Evaluation     Patient summary reviewed and Nursing notes reviewed   no history of anesthetic complications:   NPO Solid Status: > 8 hours  NPO Liquid Status: > 8 hours           Airway   Mallampati: II  TM distance: >3 FB  Neck ROM: full  no difficulty expected and Possible difficult intubation  Dental - normal exam     Pulmonary - negative pulmonary ROS and normal exam   Cardiovascular - normal exam    Rhythm: regular  Rate: normal    (+) pacemaker pacemaker, hypertension, dysrhythmias Atrial Fib, Tachycardia, Bradycardia      Neuro/Psych  (+) headaches  GI/Hepatic/Renal/Endo    (+) GERD    Musculoskeletal     (+) myalgias  Abdominal    Substance History - negative use     OB/GYN negative ob/gyn ROS         Other   arthritis,                     Anesthesia Plan    ASA 3     MAC     (Pt told that intravenous sedation will be used as the primary anesthetic along with local anesthesia if necessary. Every effort will be made to make sure the patient is comfortable.     The patient was told they may or may not have recall for the procedure. It was further explained that if the MAC was not adequate that a general anesthetic with either an LMA or endotracheal tube would be required.     Will proceed with the plan of care.)  intravenous induction     Anesthetic plan, risks, benefits, and alternatives have been provided, discussed and informed consent has been obtained with: patient.      CODE STATUS:

## 2023-08-28 NOTE — ANESTHESIA POSTPROCEDURE EVALUATION
Patient: Nely Castrejon    Procedure Summary       Date: 08/28/23 Room / Location: [facility] ENDOSCOPY 2 [facility]    Anesthesia Start: 1047 Anesthesia Stop: 1122    Procedure: ESOPHAGOGASTRODUODENOSCOPY WITH BIOPSY and DILATION (Esophagus) Diagnosis:       Gastroesophageal reflux disease without esophagitis      Dysphagia, unspecified type      (Gastroesophageal reflux disease without esophagitis [K21.9])      (Dysphagia, unspecified type [R13.10])    Surgeons: Shala Rowe MD Provider: Leeann Nicholas CRNA    Anesthesia Type: MAC ASA Status: 3            Anesthesia Type: MAC    Vitals  Vitals Value Taken Time   /76 08/28/23 1150   Temp 97.3 øF (36.3 øC) 08/28/23 1150   Pulse 76 08/28/23 1150   Resp 16 08/28/23 1150   SpO2 98 % 08/28/23 1150           Post Anesthesia Care and Evaluation    Patient location during evaluation: PHASE II  Patient participation: complete - patient participated  Level of consciousness: awake and alert  Pain score: 0  Pain management: satisfactory to patient    Airway patency: patent  Anesthetic complications: No anesthetic complications  PONV Status: none  Cardiovascular status: acceptable and stable  Respiratory status: acceptable  Hydration status: acceptable    Comments: Vitals signs as noted in nursing documentation as per protocol.

## 2023-08-28 NOTE — DISCHARGE INSTRUCTIONS
WAIT 2 DAYS BEFORE RESTARTING XARELTO    No pushing, pulling, tugging,  heavy lifting, or strenuous activity.  No major decision making, driving, or drinking alcoholic beverages for 24 hours. ( due to the medications you have  received)  Always use good hand hygiene/washing techniques.  NO driving while taking pain medications.    * if you have an incision:  Check your incision area every day for signs of infection.   Check for:  * more redness, swelling, or pain  *more fluid or blood  *warmth  *pus or bad smellTo assist you in voiding:  Drink plenty of fluids  Listen to running water while attempting to void.    If you are unable to urinate and you have an uncomfortable urge to void or it has been   6 hours since you were discharged, return to the Emergency Room

## 2023-08-29 LAB — REF LAB TEST METHOD: NORMAL

## 2023-08-30 ENCOUNTER — HOSPITAL ENCOUNTER (OUTPATIENT)
Dept: CT IMAGING | Facility: HOSPITAL | Age: 53
Discharge: HOME | End: 2023-08-30
Admitting: FAMILY MEDICINE
Payer: MEDICARE

## 2023-08-30 ENCOUNTER — EXTERNAL PBMM DATA (OUTPATIENT)
Dept: PHARMACY | Facility: OTHER | Age: 53
End: 2023-08-30
Payer: MEDICARE

## 2023-08-30 DIAGNOSIS — R22.2 SUPRACLAVICULAR FOSSA FULLNESS: ICD-10-CM

## 2023-08-30 DIAGNOSIS — R05.3 CHRONIC COUGH: ICD-10-CM

## 2023-08-30 PROCEDURE — 25510000001 IOPAMIDOL 61 % SOLUTION: Performed by: FAMILY MEDICINE

## 2023-08-30 PROCEDURE — 71260 CT THORAX DX C+: CPT

## 2023-08-30 RX ADMIN — IOPAMIDOL 100 ML: 612 INJECTION, SOLUTION INTRAVENOUS at 09:15

## 2023-09-08 ENCOUNTER — TELEPHONE (OUTPATIENT)
Dept: CARDIOLOGY | Facility: CLINIC | Age: 53
End: 2023-09-08
Payer: MEDICARE

## 2023-09-08 NOTE — TELEPHONE ENCOUNTER
Spoke with Mrs Castrejon.  She reports hearing like an alarm clock going off.  Explained to her that her device will only vibrate that it doesn't make an alarm noise.  Reading was received today and we are monitoring battery closely due to nearing BRE.  Will continue to monitor and she verbalized understanding.

## 2023-09-08 NOTE — TELEPHONE ENCOUNTER
Caller: Nely Castrejon    Relationship: Self    Best call back number: [phone]     What is the best time to reach you: ANY     Who are you requesting to speak with (clinical staff, provider,  specific staff member): CLINICAL     What was the call regarding: PT STATES THAT SHE HEARD HER PACEMAKER MAKING AN ALARM SOUND LAST NIGHT AND WOULD LIKE TO BE FURTHER ADVISED. PT STATES THIS HAPPENED IN THE MIDDLE OF THE NIGHT. PLEASE REACH OUT TO FURTHER ADVISE.     Is it okay if the provider responds through Epic!hart: YES

## 2023-09-08 NOTE — PROGRESS NOTES
Subjective   Nely Castrejon is a 53 y.o. female.   Chief Complaint   Patient presents with    Follow-up     EGD         History of Present Illness   Ms. Castrejon returns to the office today for routine follow-up after recently undergoing EGD for further investigation of an unexplained chronic cough with GERD and dysphagia. Past there is no evidence of an abnormality in the esophagus to explain her complaint of dysphagia.  The Z-line was found in normal position and was somewhat irregular.  This was biopsied.  The stomach and duodenum were otherwise unremarkable.  Biopsies of the distal esophagus demonstrated squamocolumnar junctional mucosa with intestinal metaplasia and reactive/reflux related changes without eosinophilia or dysplasia.  Biopsies of the antrum revealed reactive gastropathy recent without evidence of H. pylori.  Duodenal biopsies were unremarkable.  The esophagus was dilated to maximal diameter of 20 mm with a through-the-scope balloon.  There was no significant change or mucosal disruption evident.  The patient continues to complain of a chronic globus sensation/tickle in the throat with a chronic cough.  She continues to complain of some difficulty with swallowing that she describes as being in the upper back portion of the throat.  She does not have any sensation of difficulty swallowing the distal to the level of the mandible.    The following portions of the patient's history were reviewed and updated as appropriate: allergies, current medications, past family history, past medical history, past social history, past surgical history, and problem list.    Review of Systems   Constitutional:  Negative for diaphoresis, unexpected weight gain and unexpected weight loss.   HENT:  Negative for hearing loss, trouble swallowing and voice change.    Eyes:  Negative for visual disturbance.   Respiratory:  Positive for cough. Negative for apnea, chest tightness, shortness of breath and wheezing.   "  Cardiovascular:  Negative for chest pain, palpitations and leg swelling.   Gastrointestinal:  Negative for abdominal distention, abdominal pain, anal bleeding, blood in stool, constipation, diarrhea, nausea, rectal pain, vomiting, GERD and indigestion.   Endocrine: Negative for cold intolerance and heat intolerance.   Genitourinary:  Negative for difficulty urinating, dysuria and flank pain.   Musculoskeletal:  Negative for back pain and gait problem.   Skin:  Negative for color change, rash, skin lesions and wound.   Neurological:  Negative for dizziness, syncope, speech difficulty, weakness, light-headedness and numbness.   Hematological:  Negative for adenopathy. Does not bruise/bleed easily.   Psychiatric/Behavioral:  The patient is not nervous/anxious.       Objective   /64   Pulse 84   Temp 98 °F (36.7 °C) (Temporal)   Resp 16   Ht 160 cm (63\")   Wt 62.8 kg (138 lb 6.4 oz)   SpO2 98%   BMI 24.52 kg/m²     Physical Exam  Constitutional:       Appearance: She is well-developed.   HENT:      Head: Normocephalic and atraumatic.   Cardiovascular:      Rate and Rhythm: Regular rhythm.   Pulmonary:      Effort: Pulmonary effort is normal.   Skin:     General: Skin is warm and dry.   Neurological:      Mental Status: She is alert and oriented to person, place, and time.   Psychiatric:         Behavior: Behavior normal.         Assessment & Plan   Diagnoses and all orders for this visit:    1. Unexplained chronic cough (Primary)  -     Ambulatory Referral to ENT (Otolaryngology)    2. 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 Castrejon in the office today.   I did not appreciate any abnormality that would explain her ongoing chronic cough symptoms.  This could be due to a variety of factors including gastroesophageal reflux.  However, she is on longstanding PPI therapy with Dexilant 60 mg/day.  Her esophagus was balloon dilated without any change in her symptoms.  " Although she does endorse some difficulty swallowing, this seems to be an oral pharyngeal problem rather than an esophageal dysphagia.  I recommended that she undergo ENT evaluation given her ongoing symptoms.  We did discuss the finding of Montesinos's esophagus on biopsy.  5-year follow-up EGD could be considered for surveillance.  She was agreeable.  She may otherwise follow-up with me as needed.

## 2023-09-11 PROBLEM — M70.62 TROCHANTERIC BURSITIS OF LEFT HIP: Status: ACTIVE | Noted: 2021-02-19

## 2023-09-11 PROBLEM — M46.1 INFLAMMATION OF SACROILIAC JOINT: Status: ACTIVE | Noted: 2021-02-19

## 2023-09-12 ENCOUNTER — OFFICE VISIT (OUTPATIENT)
Dept: SURGERY | Facility: CLINIC | Age: 53
End: 2023-09-12
Payer: MEDICARE

## 2023-09-12 VITALS
TEMPERATURE: 98 F | RESPIRATION RATE: 16 BRPM | OXYGEN SATURATION: 98 % | HEIGHT: 63 IN | SYSTOLIC BLOOD PRESSURE: 128 MMHG | HEART RATE: 84 BPM | WEIGHT: 138.4 LBS | DIASTOLIC BLOOD PRESSURE: 64 MMHG | BODY MASS INDEX: 24.52 KG/M2

## 2023-09-12 DIAGNOSIS — K22.70 BARRETT'S ESOPHAGUS DETERMINED BY BIOPSY: ICD-10-CM

## 2023-09-12 DIAGNOSIS — R05.3 UNEXPLAINED CHRONIC COUGH: Primary | ICD-10-CM

## 2023-09-12 PROCEDURE — 99213 OFFICE O/P EST LOW 20 MIN: CPT | Performed by: SURGERY

## 2023-09-12 PROCEDURE — 1160F RVW MEDS BY RX/DR IN RCRD: CPT | Performed by: SURGERY

## 2023-09-12 PROCEDURE — 1159F MED LIST DOCD IN RCRD: CPT | Performed by: SURGERY

## 2023-09-12 PROCEDURE — 3074F SYST BP LT 130 MM HG: CPT | Performed by: SURGERY

## 2023-09-12 PROCEDURE — 3078F DIAST BP <80 MM HG: CPT | Performed by: SURGERY

## 2023-09-28 ENCOUNTER — TELEPHONE (OUTPATIENT)
Dept: INTERNAL MEDICINE | Facility: CLINIC | Age: 53
End: 2023-09-28
Payer: MEDICARE

## 2023-09-28 NOTE — TELEPHONE ENCOUNTER
Told patient she can either do a video visit or treat sx otc. Mucinex, Tylenol or Motrin and Flonase.Deep breathe. Check in ER for definite SOA.

## 2023-09-28 NOTE — TELEPHONE ENCOUNTER
PATIENT TESTED POSITIVE FOR COVID.  IS THERE MEDICATION OR WHAT TO DO?  COUGH, CONGESTION, ACHY, LEGS HURT, HEADACHE, A LITTLE SOA.      PLEASE CALL [phone]

## 2023-11-01 ENCOUNTER — TRANSCRIBE ORDERS (OUTPATIENT)
Dept: ADMINISTRATIVE | Facility: HOSPITAL | Age: 53
End: 2023-11-01
Payer: MEDICARE

## 2023-11-01 DIAGNOSIS — R22.1 LOCALIZED SWELLING, MASS AND LUMP, NECK: Primary | ICD-10-CM

## 2023-11-16 ENCOUNTER — HOSPITAL ENCOUNTER (OUTPATIENT)
Dept: MAMMOGRAPHY | Facility: HOSPITAL | Age: 53
Discharge: HOME | End: 2023-11-16
Admitting: FAMILY MEDICINE
Payer: MEDICARE

## 2023-11-16 DIAGNOSIS — N64.4 BREAST PAIN, LEFT: ICD-10-CM

## 2023-11-16 PROCEDURE — 77066 DX MAMMO INCL CAD BI: CPT

## 2023-11-16 PROCEDURE — G0279 TOMOSYNTHESIS, MAMMO: HCPCS

## 2023-12-01 ENCOUNTER — TELEPHONE (OUTPATIENT)
Dept: CARDIOLOGY | Facility: CLINIC | Age: 53
End: 2023-12-01
Payer: MEDICARE

## 2023-12-01 DIAGNOSIS — I34.0 MITRAL VALVE INSUFFICIENCY, UNSPECIFIED ETIOLOGY: ICD-10-CM

## 2023-12-01 DIAGNOSIS — I49.5 SICK SINUS SYNDROME: Primary | ICD-10-CM

## 2023-12-01 NOTE — TELEPHONE ENCOUNTER
Per [employer] remote cardiac device transmission received today, 12/1/2023:     Pacemaker battery @ BRE    Per last office note on 2/21/2023, echocardiogram will be ordered for MVP on day of gen change.     Pt is taking Xarelto, please advise for gen change.

## 2023-12-04 NOTE — TELEPHONE ENCOUNTER
Notified pt her pacemaker battery has reached elective replacement interval. Advised pt her device has a minimum of three months of battery function, if not longer, & that the pacemaker function would remain unchanged. Advised pt to expect a call from the procedure  to arrange a date/time of battery change.     Also informed pt that she would have an echocardiogram the day of her procedure secondary to her MVP. Pt verbalized understanding & appreciation.

## 2023-12-05 PROBLEM — I49.5 SICK SINUS SYNDROME: Status: ACTIVE | Noted: 2023-12-01

## 2023-12-07 ENCOUNTER — PREP FOR SURGERY (OUTPATIENT)
Dept: OTHER | Facility: HOSPITAL | Age: 53
End: 2023-12-07
Payer: MEDICARE

## 2023-12-07 ENCOUNTER — HOSPITAL ENCOUNTER (OUTPATIENT)
Dept: CT IMAGING | Facility: HOSPITAL | Age: 53
Discharge: HOME | End: 2023-12-07
Admitting: OTOLARYNGOLOGY
Payer: MEDICARE

## 2023-12-07 DIAGNOSIS — Z45.010 PACEMAKER BATTERY DEPLETION: Primary | ICD-10-CM

## 2023-12-07 DIAGNOSIS — R22.1 LOCALIZED SWELLING, MASS AND LUMP, NECK: ICD-10-CM

## 2023-12-07 PROCEDURE — 25510000001 IOPAMIDOL 61 % SOLUTION: Performed by: OTOLARYNGOLOGY

## 2023-12-07 PROCEDURE — 70491 CT SOFT TISSUE NECK W/DYE: CPT

## 2023-12-07 RX ORDER — ONDANSETRON 2 MG/ML
4 INJECTION INTRAMUSCULAR; INTRAVENOUS EVERY 6 HOURS PRN
OUTPATIENT
Start: 2023-12-07

## 2023-12-07 RX ORDER — SODIUM CHLORIDE 0.9 % (FLUSH) 0.9 %
10 SYRINGE (ML) INJECTION AS NEEDED
OUTPATIENT
Start: 2023-12-07

## 2023-12-07 RX ORDER — ACETAMINOPHEN 325 MG/1
650 TABLET ORAL EVERY 4 HOURS PRN
OUTPATIENT
Start: 2023-12-07

## 2023-12-07 RX ORDER — CEFAZOLIN SODIUM 2 G/100ML
2 INJECTION, SOLUTION INTRAVENOUS ONCE
OUTPATIENT
Start: 2023-12-07 | End: 2023-12-07

## 2023-12-07 RX ORDER — SODIUM CHLORIDE 0.9 % (FLUSH) 0.9 %
3 SYRINGE (ML) INJECTION EVERY 12 HOURS SCHEDULED
OUTPATIENT
Start: 2023-12-07

## 2023-12-07 RX ORDER — NITROGLYCERIN 0.4 MG/1
0.4 TABLET SUBLINGUAL
OUTPATIENT
Start: 2023-12-07

## 2023-12-07 RX ORDER — SODIUM CHLORIDE 9 MG/ML
40 INJECTION, SOLUTION INTRAVENOUS AS NEEDED
OUTPATIENT
Start: 2023-12-07

## 2023-12-07 RX ADMIN — IOPAMIDOL 100 ML: 612 INJECTION, SOLUTION INTRAVENOUS at 10:13

## 2023-12-27 ENCOUNTER — TELEPHONE (OUTPATIENT)
Dept: CARDIOLOGY | Facility: CLINIC | Age: 53
End: 2023-12-27
Payer: MEDICARE

## 2023-12-27 NOTE — TELEPHONE ENCOUNTER
I tried to call [facility] back but the representative did not answer. I left a message for her to call me back at the office.

## 2023-12-27 NOTE — TELEPHONE ENCOUNTER
Caller: [facility]    Relationship: Network    Best call back number: [phone]    What form or medical record are you requesting: ANY CLINICAL NOTES SUPPORTING THE PROCEDURE    Who is requesting this form or medical record from you: [facility]    How would you like to receive the form or medical records (pick-up, mail, fax): FAX  If fax, what is the fax number: [phone]      Timeframe paperwork needed: ASAP    Additional notes: MUST BE CLINICALLY REVIEWED. ASKED TO SEND AS HIGH PRIORITY.

## 2024-01-02 ENCOUNTER — APPOINTMENT (OUTPATIENT)
Dept: GENERAL RADIOLOGY | Facility: HOSPITAL | Age: 54
End: 2024-01-02
Payer: MEDICARE

## 2024-01-02 ENCOUNTER — HOSPITAL ENCOUNTER (EMERGENCY)
Facility: HOSPITAL | Age: 54
Discharge: HOME | End: 2024-01-02
Attending: STUDENT IN AN ORGANIZED HEALTH CARE EDUCATION/TRAINING PROGRAM | Admitting: STUDENT IN AN ORGANIZED HEALTH CARE EDUCATION/TRAINING PROGRAM
Payer: MEDICARE

## 2024-01-02 VITALS
SYSTOLIC BLOOD PRESSURE: 145 MMHG | HEART RATE: 92 BPM | OXYGEN SATURATION: 97 % | BODY MASS INDEX: 24.8 KG/M2 | DIASTOLIC BLOOD PRESSURE: 96 MMHG | RESPIRATION RATE: 20 BRPM | WEIGHT: 140 LBS | HEIGHT: 63 IN | TEMPERATURE: 97.9 F

## 2024-01-02 DIAGNOSIS — J06.9 VIRAL URI WITH COUGH: Primary | ICD-10-CM

## 2024-01-02 LAB
FLUAV RNA RESP QL NAA+PROBE: NOT DETECTED
FLUBV RNA RESP QL NAA+PROBE: NOT DETECTED
SARS-COV-2 RNA RESP QL NAA+PROBE: NOT DETECTED

## 2024-01-02 PROCEDURE — 87636 SARSCOV2 & INF A&B AMP PRB: CPT | Performed by: STUDENT IN AN ORGANIZED HEALTH CARE EDUCATION/TRAINING PROGRAM

## 2024-01-02 PROCEDURE — 71045 X-RAY EXAM CHEST 1 VIEW: CPT

## 2024-01-02 PROCEDURE — 99283 EMERGENCY DEPT VISIT LOW MDM: CPT

## 2024-01-02 RX ORDER — BENZONATATE 100 MG/1
100 CAPSULE ORAL 3 TIMES DAILY PRN
Qty: 30 CAPSULE | Refills: 0 | Status: SHIPPED | OUTPATIENT
Start: 2024-01-02

## 2024-01-02 NOTE — DISCHARGE INSTRUCTIONS
You were evaluated for cough and congestion.  We swabbed you for COVID and flu which were negative and got a chest x-ray which showed no concern for pneumonia.  You are now stable for discharge.  Would recommend following up with primary care doctor to ensure that you improve appropriately.  If begin to have severe shortness of breath or chest pain please connect emergency department for further evaluation.  You are now stable for discharge.

## 2024-01-02 NOTE — Clinical Note
[facility] EMERGENCY DEPARTMENT  [address] [address]  Phone: [phone]    Nely Castrejon was seen and treated in our emergency department on 1/2/2024.  She may return to work on 01/05/2024.         Thank you for choosing [facility].    Bandar Zapata MD

## 2024-01-02 NOTE — ED PROVIDER NOTES
Subjective:  History of Present Illness:    Patient is a 53-year-old female history of fibromyalgia, hypertension, sick sinus syndrome presents today with increasing cough and congestion.  Reports duration of symptoms of 5 days.  Denies any fevers.  No chest pain or shortness of breath.  Denies abdominal pain nausea vomiting or diarrhea.  No leg swelling or pain.  No personal history of PE/DVT.  Well-appearing on exam.      Nurses Notes reviewed and agree, including vitals, allergies, social history and prior medical history.     REVIEW OF SYSTEMS: All systems reviewed and not pertinent unless noted.  Review of Systems   Constitutional:  Positive for activity change. Negative for appetite change, chills, fatigue and fever.   HENT:  Positive for congestion and rhinorrhea. Negative for sinus pressure and sinus pain.    Eyes:  Negative for discharge and itching.   Respiratory:  Positive for cough. Negative for shortness of breath.    Cardiovascular:  Negative for chest pain and leg swelling.   Gastrointestinal:  Negative for abdominal distention, abdominal pain, nausea and vomiting.   Endocrine: Negative for cold intolerance and heat intolerance.   Genitourinary:  Negative for decreased urine volume, difficulty urinating, flank pain, frequency, urgency, vaginal bleeding, vaginal discharge and vaginal pain.   Musculoskeletal:  Negative for gait problem, neck pain and neck stiffness.   Skin:  Negative for color change.   Allergic/Immunologic: Negative for environmental allergies.   Neurological:  Negative for seizures, syncope, facial asymmetry and speech difficulty.   Psychiatric/Behavioral:  Negative for self-injury and suicidal ideas.        Past Medical History:   Diagnosis Date    Arthritis     Fibromyalgia, primary     GERD (gastroesophageal reflux disease)     History of osteoarthritis 08/01/2016    Hypertension     Primary stabbing headache     Sick sinus syndrome     Vitamin B12 deficiency   "      Allergies:    Patient has no known allergies.      Past Surgical History:   Procedure Laterality Date    CHOLECYSTECTOMY      COLONOSCOPY  01/09/2020    [facility] - Dr. Katie Melgar - colon tortuous but otherwise normal. 10 year follow up screening recommended.    ENDOSCOPY      ENDOSCOPY N/A 8/28/2023    Procedure: ESOPHAGOGASTRODUODENOSCOPY WITH BIOPSY and DILATION;  Surgeon: Shala Rowe MD;  Location: [facility] ENDOSCOPY;  Service: Gastroenterology;  Laterality: N/A;    OOPHORECTOMY      PACEMAKER IMPLANTATION      SHOULDER SURGERY      left    SUBTOTAL HYSTERECTOMY      Partial          Social History     Socioeconomic History    Marital status:    Tobacco Use    Smoking status: Never    Smokeless tobacco: Never   Vaping Use    Vaping Use: Never used   Substance and Sexual Activity    Alcohol use: Never    Drug use: Never    Sexual activity: Defer         Family History   Problem Relation Age of Onset    Arthritis Maternal Grandmother     Hypertension Maternal Grandmother     Kidney disease Maternal Grandmother     Breast cancer Paternal Aunt        Objective  Physical Exam:  /96 (BP Location: Left arm, Patient Position: Sitting)   Pulse 92   Temp 97.9 °F (36.6 °C) (Oral)   Resp 20   Ht 160 cm (63\")   Wt 63.5 kg (140 lb)   SpO2 97%   BMI 24.80 kg/m²      Physical Exam  Constitutional:       General: She is not in acute distress.     Appearance: Normal appearance. She is not ill-appearing.   HENT:      Head: Normocephalic and atraumatic.      Nose: Nose normal. No congestion or rhinorrhea.      Mouth/Throat:      Mouth: Mucous membranes are dry.      Pharynx: Oropharynx is clear. No oropharyngeal exudate or posterior oropharyngeal erythema.   Eyes:      Extraocular Movements: Extraocular movements intact.      Conjunctiva/sclera: Conjunctivae normal.      Pupils: Pupils are equal, round, and reactive to light.   Cardiovascular:      Rate and Rhythm: Normal rate and " regular rhythm.      Pulses: Normal pulses.      Heart sounds: Normal heart sounds.   Pulmonary:      Effort: Pulmonary effort is normal. No respiratory distress.      Breath sounds: Normal breath sounds. No wheezing.   Abdominal:      General: Abdomen is flat. Bowel sounds are normal. There is no distension.      Palpations: Abdomen is soft.      Tenderness: There is no abdominal tenderness.   Musculoskeletal:         General: No swelling or tenderness. Normal range of motion.      Cervical back: Normal range of motion and neck supple.   Skin:     General: Skin is warm and dry.      Capillary Refill: Capillary refill takes less than 2 seconds.   Neurological:      General: No focal deficit present.      Mental Status: She is alert and oriented to person, place, and time. Mental status is at baseline.      Cranial Nerves: No cranial nerve deficit.      Sensory: No sensory deficit.      Motor: No weakness.      Coordination: Coordination normal.   Psychiatric:         Mood and Affect: Mood normal.         Behavior: Behavior normal.         Thought Content: Thought content normal.         Judgment: Judgment normal.         Procedures    ED Course:         Lab Results (last 24 hours)       Procedure Component Value Units Date/Time    COVID-19 and FLU A/B PCR, 1 HR TAT - Swab, Nasopharynx [[id number]]  (Normal) Collected: 01/02/24 0752    Specimen: Swab from Nasopharynx Updated: 01/02/24 0817     COVID19 Not Detected     Influenza A PCR Not Detected     Influenza B PCR Not Detected    Narrative:      Fact sheet for providers: https://www.fda.gov/media/675424/download    Fact sheet for patients: https://www.fda.gov/media/401558/download    Test performed by PCR.             No radiology results from the last 24 hrs       MDM     Amount and/or Complexity of Data Reviewed  Independent visualization of images, tracings, or specimens: yes        Initial impression of presenting illness: Cough, congestion    DDX: includes but  is not limited to: Viral URI, COVID-19, bacterial pneumonia    Patient arrives stable with vitals interpreted by myself.     Pertinent features from physical exam: Clear to auscultation, regular rate and rhythm, no murmur, not hypoxic, not tachycardic, not tachypneic at the time my exam.    Initial diagnostic plan: COVID swab, chest x-ray    Results from initial plan were reviewed and interpreted by me revealing negative chest x-ray, COVID and flu negative    Diagnostic information from other sources: Reviewed past medical records    Interventions / Re-evaluation: Observed in emergency department for over 30 minutes with no change in vital signs    Results/clinical rationale were discussed with patient at bedside    Consultations/Discussion of results with other physicians: Discussed negative viral swab negative chest x-ray in our emergency department I believe the patient likely has viral URI.  Encouraged her to follow-up with primary care doctor to ensure resolution of symptoms and given strict turn precautions for chest pain, shortness of breath.  Prescribed Tessalon Perles for symptomatic relief    Disposition plan: Discharge  -----        Final diagnoses:   Viral URI with cough          Bandar Zapata MD  01/02/24 8617

## 2024-01-03 ENCOUNTER — HOSPITAL ENCOUNTER (OUTPATIENT)
Facility: HOSPITAL | Age: 54
Setting detail: HOSPITAL OUTPATIENT SURGERY
Discharge: HOME | End: 2024-01-03
Attending: STUDENT IN AN ORGANIZED HEALTH CARE EDUCATION/TRAINING PROGRAM | Admitting: STUDENT IN AN ORGANIZED HEALTH CARE EDUCATION/TRAINING PROGRAM
Payer: MEDICARE

## 2024-01-03 ENCOUNTER — APPOINTMENT (OUTPATIENT)
Dept: CARDIOLOGY | Facility: HOSPITAL | Age: 54
End: 2024-01-03
Payer: MEDICARE

## 2024-01-03 VITALS
RESPIRATION RATE: 16 BRPM | DIASTOLIC BLOOD PRESSURE: 89 MMHG | HEART RATE: 80 BPM | WEIGHT: 139.8 LBS | BODY MASS INDEX: 24.77 KG/M2 | SYSTOLIC BLOOD PRESSURE: 132 MMHG | OXYGEN SATURATION: 96 % | HEIGHT: 63 IN | TEMPERATURE: 98.3 F

## 2024-01-03 DIAGNOSIS — I49.5 SICK SINUS SYNDROME: ICD-10-CM

## 2024-01-03 DIAGNOSIS — Z45.010 PACEMAKER BATTERY DEPLETION: ICD-10-CM

## 2024-01-03 LAB
ANION GAP SERPL CALCULATED.3IONS-SCNC: 10 MMOL/L (ref 5–15)
B-HCG UR QL: NEGATIVE
BH CV ECHO MEAS - AO MAX PG: 9.4 MMHG
BH CV ECHO MEAS - AO MEAN PG: 5 MMHG
BH CV ECHO MEAS - AO ROOT DIAM: 3.5 CM
BH CV ECHO MEAS - AO V2 MAX: 153 CM/SEC
BH CV ECHO MEAS - AO V2 VTI: 30.6 CM
BH CV ECHO MEAS - AVA(I,D): 2.22 CM2
BH CV ECHO MEAS - EDV(CUBED): 117.6 ML
BH CV ECHO MEAS - EDV(MOD-SP2): 87.4 ML
BH CV ECHO MEAS - EDV(MOD-SP4): 83.4 ML
BH CV ECHO MEAS - EF(MOD-BP): 62 %
BH CV ECHO MEAS - EF(MOD-SP2): 65.1 %
BH CV ECHO MEAS - EF(MOD-SP4): 58.9 %
BH CV ECHO MEAS - ESV(CUBED): 27 ML
BH CV ECHO MEAS - ESV(MOD-SP2): 30.5 ML
BH CV ECHO MEAS - ESV(MOD-SP4): 34.3 ML
BH CV ECHO MEAS - FS: 38.8 %
BH CV ECHO MEAS - IVS/LVPW: 1 CM
BH CV ECHO MEAS - IVSD: 1 CM
BH CV ECHO MEAS - LA DIMENSION: 3.1 CM
BH CV ECHO MEAS - LAT PEAK E' VEL: 14 CM/SEC
BH CV ECHO MEAS - LV MASS(C)D: 176 GRAMS
BH CV ECHO MEAS - LV MAX PG: 6.4 MMHG
BH CV ECHO MEAS - LV MEAN PG: 3 MMHG
BH CV ECHO MEAS - LV V1 MAX: 126 CM/SEC
BH CV ECHO MEAS - LV V1 VTI: 21.6 CM
BH CV ECHO MEAS - LVIDD: 4.9 CM
BH CV ECHO MEAS - LVIDS: 3 CM
BH CV ECHO MEAS - LVOT AREA: 3.1 CM2
BH CV ECHO MEAS - LVOT DIAM: 2 CM
BH CV ECHO MEAS - LVPWD: 1 CM
BH CV ECHO MEAS - MED PEAK E' VEL: 9 CM/SEC
BH CV ECHO MEAS - MV A MAX VEL: 61.8 CM/SEC
BH CV ECHO MEAS - MV DEC TIME: 0.28 SEC
BH CV ECHO MEAS - MV E MAX VEL: 76 CM/SEC
BH CV ECHO MEAS - MV E/A: 1.23
BH CV ECHO MEAS - PA ACC TIME: 0.19 SEC
BH CV ECHO MEAS - PA V2 MAX: 89.8 CM/SEC
BH CV ECHO MEAS - RAP SYSTOLE: 3 MMHG
BH CV ECHO MEAS - RVSP: 29 MMHG
BH CV ECHO MEAS - SV(LVOT): 67.9 ML
BH CV ECHO MEAS - SV(MOD-SP2): 56.9 ML
BH CV ECHO MEAS - SV(MOD-SP4): 49.1 ML
BH CV ECHO MEAS - TAPSE (>1.6): 2.26 CM
BH CV ECHO MEAS - TR MAX PG: 25.9 MMHG
BH CV ECHO MEAS - TR MAX VEL: 254 CM/SEC
BH CV ECHO MEASUREMENTS AVERAGE E/E' RATIO: 6.61
BH CV VAS BP LEFT ARM: NORMAL MMHG
BH CV XLRA - TDI S': 13.4 CM/SEC
BUN SERPL-MCNC: 17 MG/DL (ref 6–20)
BUN/CREAT SERPL: 19.1 (ref 7–25)
CALCIUM SPEC-SCNC: 9 MG/DL (ref 8.6–10.5)
CHLORIDE SERPL-SCNC: 101 MMOL/L (ref 98–107)
CO2 SERPL-SCNC: 27 MMOL/L (ref 22–29)
CREAT SERPL-MCNC: 0.89 MG/DL (ref 0.57–1)
DEPRECATED RDW RBC AUTO: 40.5 FL (ref 37–54)
EGFRCR SERPLBLD CKD-EPI 2021: 77.2 ML/MIN/1.73
ERYTHROCYTE [DISTWIDTH] IN BLOOD BY AUTOMATED COUNT: 12.8 % (ref 12.3–15.4)
GLUCOSE SERPL-MCNC: 103 MG/DL (ref 65–99)
HCT VFR BLD AUTO: 38.4 % (ref 34–46.6)
HGB BLD-MCNC: 12.6 G/DL (ref 12–15.9)
LEFT ATRIUM VOLUME INDEX: 20.6 ML/M2
MAGNESIUM SERPL-MCNC: 2.2 MG/DL (ref 1.6–2.6)
MCH RBC QN AUTO: 28.8 PG (ref 26.6–33)
MCHC RBC AUTO-ENTMCNC: 32.8 G/DL (ref 31.5–35.7)
MCV RBC AUTO: 87.7 FL (ref 79–97)
PLATELET # BLD AUTO: 175 10*3/MM3 (ref 140–450)
PMV BLD AUTO: 11.4 FL (ref 6–12)
POTASSIUM SERPL-SCNC: 2.9 MMOL/L (ref 3.5–5.2)
RBC # BLD AUTO: 4.38 10*6/MM3 (ref 3.77–5.28)
SODIUM SERPL-SCNC: 138 MMOL/L (ref 136–145)
WBC NRBC COR # BLD AUTO: 6.87 10*3/MM3 (ref 3.4–10.8)

## 2024-01-03 PROCEDURE — 25010000002 MIDAZOLAM PER 1 MG: Performed by: STUDENT IN AN ORGANIZED HEALTH CARE EDUCATION/TRAINING PROGRAM

## 2024-01-03 PROCEDURE — 93306 TTE W/DOPPLER COMPLETE: CPT | Performed by: INTERNAL MEDICINE

## 2024-01-03 PROCEDURE — C1785 PMKR, DUAL, RATE-RESP: HCPCS | Performed by: STUDENT IN AN ORGANIZED HEALTH CARE EDUCATION/TRAINING PROGRAM

## 2024-01-03 PROCEDURE — 33218 REPAIR LEAD PACE-DEFIB ONE: CPT | Performed by: STUDENT IN AN ORGANIZED HEALTH CARE EDUCATION/TRAINING PROGRAM

## 2024-01-03 PROCEDURE — 25010000002 FENTANYL CITRATE (PF) 50 MCG/ML SOLUTION: Performed by: STUDENT IN AN ORGANIZED HEALTH CARE EDUCATION/TRAINING PROGRAM

## 2024-01-03 PROCEDURE — 99152 MOD SED SAME PHYS/QHP 5/>YRS: CPT | Performed by: STUDENT IN AN ORGANIZED HEALTH CARE EDUCATION/TRAINING PROGRAM

## 2024-01-03 PROCEDURE — 33228 REMV&REPLC PM GEN DUAL LEAD: CPT | Performed by: STUDENT IN AN ORGANIZED HEALTH CARE EDUCATION/TRAINING PROGRAM

## 2024-01-03 PROCEDURE — 25810000003 SODIUM CHLORIDE 0.9 % SOLUTION: Performed by: STUDENT IN AN ORGANIZED HEALTH CARE EDUCATION/TRAINING PROGRAM

## 2024-01-03 PROCEDURE — 99153 MOD SED SAME PHYS/QHP EA: CPT | Performed by: STUDENT IN AN ORGANIZED HEALTH CARE EDUCATION/TRAINING PROGRAM

## 2024-01-03 PROCEDURE — 80048 BASIC METABOLIC PNL TOTAL CA: CPT | Performed by: PHYSICIAN ASSISTANT

## 2024-01-03 PROCEDURE — 25010000002 CEFAZOLIN PER 500 MG: Performed by: PHYSICIAN ASSISTANT

## 2024-01-03 PROCEDURE — 81025 URINE PREGNANCY TEST: CPT | Performed by: STUDENT IN AN ORGANIZED HEALTH CARE EDUCATION/TRAINING PROGRAM

## 2024-01-03 PROCEDURE — 85027 COMPLETE CBC AUTOMATED: CPT | Performed by: STUDENT IN AN ORGANIZED HEALTH CARE EDUCATION/TRAINING PROGRAM

## 2024-01-03 PROCEDURE — 93306 TTE W/DOPPLER COMPLETE: CPT

## 2024-01-03 PROCEDURE — 25010000002 ONDANSETRON PER 1 MG: Performed by: STUDENT IN AN ORGANIZED HEALTH CARE EDUCATION/TRAINING PROGRAM

## 2024-01-03 PROCEDURE — 83735 ASSAY OF MAGNESIUM: CPT

## 2024-01-03 DEVICE — GEN PM ASSURITY MRI DR RF PM2272: Type: IMPLANTABLE DEVICE | Site: HEART | Status: FUNCTIONAL

## 2024-01-03 RX ORDER — MIDAZOLAM HYDROCHLORIDE 1 MG/ML
INJECTION INTRAMUSCULAR; INTRAVENOUS
Status: DISCONTINUED | OUTPATIENT
Start: 2024-01-03 | End: 2024-01-03 | Stop reason: HOSPADM

## 2024-01-03 RX ORDER — ACETAMINOPHEN 325 MG/1
650 TABLET ORAL EVERY 4 HOURS PRN
Status: DISCONTINUED | OUTPATIENT
Start: 2024-01-03 | End: 2024-01-03 | Stop reason: HOSPADM

## 2024-01-03 RX ORDER — SODIUM CHLORIDE 0.9 % (FLUSH) 0.9 %
3 SYRINGE (ML) INJECTION EVERY 12 HOURS SCHEDULED
Status: DISCONTINUED | OUTPATIENT
Start: 2024-01-03 | End: 2024-01-03 | Stop reason: HOSPADM

## 2024-01-03 RX ORDER — SODIUM CHLORIDE 0.9 % (FLUSH) 0.9 %
10 SYRINGE (ML) INJECTION AS NEEDED
Status: DISCONTINUED | OUTPATIENT
Start: 2024-01-03 | End: 2024-01-03 | Stop reason: HOSPADM

## 2024-01-03 RX ORDER — NITROGLYCERIN 0.4 MG/1
0.4 TABLET SUBLINGUAL
Status: DISCONTINUED | OUTPATIENT
Start: 2024-01-03 | End: 2024-01-03 | Stop reason: HOSPADM

## 2024-01-03 RX ORDER — FENTANYL CITRATE 50 UG/ML
INJECTION, SOLUTION INTRAMUSCULAR; INTRAVENOUS
Status: DISCONTINUED | OUTPATIENT
Start: 2024-01-03 | End: 2024-01-03 | Stop reason: HOSPADM

## 2024-01-03 RX ORDER — FAMOTIDINE 40 MG/1
40 TABLET, FILM COATED ORAL DAILY
COMMUNITY

## 2024-01-03 RX ORDER — SODIUM CHLORIDE 9 MG/ML
INJECTION, SOLUTION INTRAVENOUS
Status: COMPLETED | OUTPATIENT
Start: 2024-01-03 | End: 2024-01-03

## 2024-01-03 RX ORDER — ACETAMINOPHEN 650 MG/1
650 SUPPOSITORY RECTAL EVERY 4 HOURS PRN
Status: DISCONTINUED | OUTPATIENT
Start: 2024-01-03 | End: 2024-01-03 | Stop reason: HOSPADM

## 2024-01-03 RX ORDER — ONDANSETRON 2 MG/ML
INJECTION INTRAMUSCULAR; INTRAVENOUS
Status: DISCONTINUED | OUTPATIENT
Start: 2024-01-03 | End: 2024-01-03 | Stop reason: HOSPADM

## 2024-01-03 RX ORDER — SODIUM CHLORIDE 9 MG/ML
40 INJECTION, SOLUTION INTRAVENOUS AS NEEDED
Status: DISCONTINUED | OUTPATIENT
Start: 2024-01-03 | End: 2024-01-03 | Stop reason: HOSPADM

## 2024-01-03 RX ORDER — LIDOCAINE HCL/EPINEPHRINE/PF 2%-1:200K
VIAL (ML) INJECTION
Status: DISCONTINUED | OUTPATIENT
Start: 2024-01-03 | End: 2024-01-03 | Stop reason: HOSPADM

## 2024-01-03 RX ORDER — ONDANSETRON 2 MG/ML
4 INJECTION INTRAMUSCULAR; INTRAVENOUS EVERY 6 HOURS PRN
Status: DISCONTINUED | OUTPATIENT
Start: 2024-01-03 | End: 2024-01-03 | Stop reason: HOSPADM

## 2024-01-03 RX ORDER — POTASSIUM CHLORIDE 750 MG/1
40 CAPSULE, EXTENDED RELEASE ORAL ONCE
Status: COMPLETED | OUTPATIENT
Start: 2024-01-03 | End: 2024-01-03

## 2024-01-03 RX ADMIN — POTASSIUM CHLORIDE 40 MEQ: 750 CAPSULE, EXTENDED RELEASE ORAL at 10:25

## 2024-01-03 RX ADMIN — SODIUM CHLORIDE 2 G: 900 INJECTION INTRAVENOUS at 07:58

## 2024-01-03 NOTE — H&P
Pre-cardiac Procedure History and Physical  DC PPM Generator Change  [facility]      Patient:  Nely Castrejon  :  1970  MRN: [id number]    PCP:  Jie Mayo MD  PHONE:  [phone]    DATE: 1/3/2024  ID: Nely Castrejon is a 53 y.o. female from [place], [place]    CC: Device at [facility]    Cardiac PMH: (Old records have been reviewed and summarized below)  PAF  Tachybradycardia  [place] PPM  Negative MPS 2020  Chadsavasc=2.   PSVT  PVC's  Remote EP RFA Dr. Hughes  HTN; Controlled, on Toprol, Chlorthalidone, Avapro  GERD, Aciphex  Depression, Anxiety      BRIEF HPI: Ms. Castrejon is a very pleasant 53-year-old female with a history of sinus node dysfunction status post dual-chamber pacemaker, PVCs status post prior ablation, paroxysmal atrial fibrillation and paroxysmal atrial tachycardia.  We received notification from her device that it reached [facility] on 2023 and she is subsequently set up for this generator change today.  Since we last saw the patient in February, she has had no significant change in her cardiovascular health.  She did report to remote ER with upper respiratory symptoms yesterday including increased cough and congestion x 5 days.  She was negative for COVID and flu A/B and was discharged home with plans for supportive care. Today she reports that she has continued to have cough going on for weeks but denies any dyspnea, chest pain or palpitations.      Allergies:      No Known Allergies    MEDICATIONS:  Current Outpatient Medications   Medication Instructions    benzonatate (TESSALON) 100 mg, Oral, 3 Times Daily PRN    chlorthalidone (HYGROTON) 12.5 mg, Oral, Daily    Cyanocobalamin (B-12) 1000 MCG capsule take 1 capsule by mouth daily    dexlansoprazole (DEXILANT) 60 mg, Oral, Daily    famotidine (PEPCID) 40 mg, Oral, Daily    flecainide (TAMBOCOR) 50 mg, Oral, 2 Times Daily    folic acid-vit B6-vit B12 (FOLBEE) 2.5-25-1 MG tablet tablet  "Oral, Daily    irbesartan (AVAPRO) 150 mg, Oral, Nightly    rivaroxaban (XARELTO) 20 mg, Oral, Daily With Dinner    vitamin D3 5,000 Units, Oral, Daily       Past medical & surgical history, social and family history reviewed in the electronic medical record.    ROS: Pertinent positives listed in the HPI and problem list above. All others reviewed and negative.     Physical Exam:   /91 (BP Location: Left arm, Patient Position: Lying)   Pulse 71   Temp 98.3 °F (36.8 °C) (Temporal)   Resp 15   Ht 160 cm (63\")   Wt 63.4 kg (139 lb 12.8 oz)   SpO2 95%   BMI 24.76 kg/m²     Constitutional:    Well-appearing 53 y.o. y/o adult  in no acute distress         Heart:    Regular rhythm and normal rate, holosystolic murmur noted   Lungs:     Clear to auscultation bilaterally, no wheezes, rhales or rhonchi, nonlabored respirations   Abdomen:     Soft, nontender   Extremities:   No gross deformities, no edema, clubbing, or cyanosis.    Pulses:    Neuro:  Psych:   Radial and dorsalis pedis pulses palpable and equal bilaterally.  No gross focal deficits  Mood and behavior appropriate for situation       Labs and Diagnostic Data:  Lab Results   Component Value Date    GLUCOSE 91 08/11/2023    BUN 13 08/11/2023    CREATININE 0.81 08/11/2023    EGFRRESULT 86 08/11/2023    EGFR >60 08/20/2015    BCR 16 08/11/2023    K 4.3 08/11/2023    CO2 22 08/11/2023    CALCIUM 9.5 08/11/2023    PROTENTOTREF 6.9 08/11/2023    ALBUMIN 4.7 08/11/2023    BILITOT 0.3 08/11/2023    AST 16 08/11/2023    ALT 12 08/11/2023     Lab Results   Component Value Date    WBC 7.4 08/11/2023    HGB 14.0 08/11/2023    HCT 41.0 08/11/2023    MCV 86 08/11/2023     08/11/2023     Lab Results   Component Value Date    CHLPL 219 (H) 08/11/2023    TRIG 99 08/11/2023    HDL 79 08/11/2023     (H) 08/11/2023     Lab Results   Component Value Date    HGBA1C 5.7 (H) 08/11/2023         Tele: atrial paced rhythm    IMPRESSION:  Ms. Castrejon is a " 53-year-old female with a history of sinus node dysfunction status post dual-chamber pacemaker, PVCs status post prior ablation, paroxysmal atrial fibrillation and paroxysmal atrial tachycardia who presents today for dual-chamber pacemaker generator change.    PLAN:  Procedure to perform: Pacemaker Generator Change Risks, benefits and alternatives to the procedure explained to the patient and she understands and wishes to proceed.   Echo to assess MVP    Scribed by Jose Francisco Jean PA-C for Dr. Andrei Raphael MD on 1/3/2024

## 2024-01-03 NOTE — DISCHARGE INSTR - APPOINTMENTS
You will have  a 7-10 day wound check and 3 month follow up with Dr. Raphael. Please call the office in a few days if you do not receive call with appt info.  Office number: [phone]

## 2024-01-10 ENCOUNTER — OFFICE VISIT (OUTPATIENT)
Dept: CARDIOLOGY | Facility: CLINIC | Age: 54
End: 2024-01-10
Payer: MEDICARE

## 2024-01-10 DIAGNOSIS — Z45.010 PACEMAKER BATTERY DEPLETION: Primary | ICD-10-CM

## 2024-01-10 NOTE — PROGRESS NOTES
01/10/2024    Nely Castrejon, : 1970      Fever: No    Temperature if indicated:     Wound Location: Left Infraclavicular    Dressing Removed: Removed by MA/RN      Old Dressing Appearance:  Clean, dry    Wound Appearance: Redness []                  Drainage []                  Culture obtained []        Color: N/A     Consistency:        Amount: none         Gloves used, wound cleansed with sterile 4x4 and peroxide [x]       MD notified [x]     MD orders:     Antibiotic started []      If checked, type     Other: Patient complained of pinching in her sternum and soreness in her left arm and armpit. Told Patience PORRAS and she had Will Kaylin OCONNOR to talk to patient. He released patient to go home      Appointment for follow-up scheduled for 3 months post procedure [x]    Future Appointments   Date Time Provider Department Center   2024 11:15 AM Andrei Raphael MD [facility] [place], 01/10/24      MD Signature:______________________________ Completed By/Date:

## 2024-01-21 ENCOUNTER — HOSPITAL ENCOUNTER (EMERGENCY)
Facility: HOSPITAL | Age: 54
Discharge: HOME | End: 2024-01-21
Attending: EMERGENCY MEDICINE | Admitting: EMERGENCY MEDICINE
Payer: MEDICARE

## 2024-01-21 VITALS
HEIGHT: 63 IN | SYSTOLIC BLOOD PRESSURE: 158 MMHG | WEIGHT: 139 LBS | OXYGEN SATURATION: 98 % | DIASTOLIC BLOOD PRESSURE: 110 MMHG | RESPIRATION RATE: 16 BRPM | HEART RATE: 98 BPM | BODY MASS INDEX: 24.63 KG/M2 | TEMPERATURE: 97.9 F

## 2024-01-21 DIAGNOSIS — K08.89 PAIN, DENTAL: Primary | ICD-10-CM

## 2024-01-21 PROCEDURE — 99283 EMERGENCY DEPT VISIT LOW MDM: CPT

## 2024-01-21 RX ORDER — CHLORHEXIDINE GLUCONATE ORAL RINSE 1.2 MG/ML
15 SOLUTION DENTAL 4 TIMES DAILY
Qty: 473 ML | Refills: 0 | Status: SHIPPED | OUTPATIENT
Start: 2024-01-21 | End: 2024-01-28

## 2024-01-21 RX ORDER — HYDROCODONE BITARTRATE AND ACETAMINOPHEN 5; 325 MG/1; MG/1
1 TABLET ORAL ONCE
Status: COMPLETED | OUTPATIENT
Start: 2024-01-21 | End: 2024-01-21

## 2024-01-21 RX ORDER — AMOXICILLIN 500 MG/1
500 CAPSULE ORAL 2 TIMES DAILY
Qty: 14 CAPSULE | Refills: 0 | Status: SHIPPED | OUTPATIENT
Start: 2024-01-21 | End: 2024-01-28

## 2024-01-21 RX ORDER — HYDROCODONE BITARTRATE AND ACETAMINOPHEN 5; 325 MG/1; MG/1
1 TABLET ORAL EVERY 6 HOURS PRN
Qty: 8 TABLET | Refills: 0 | Status: SHIPPED | OUTPATIENT
Start: 2024-01-21

## 2024-01-21 RX ORDER — LIDOCAINE HYDROCHLORIDE 20 MG/ML
10 SOLUTION OROPHARYNGEAL ONCE
Status: COMPLETED | OUTPATIENT
Start: 2024-01-21 | End: 2024-01-21

## 2024-01-21 RX ORDER — METHOCARBAMOL 500 MG/1
500 TABLET, FILM COATED ORAL 3 TIMES DAILY PRN
Qty: 15 TABLET | Refills: 0 | Status: SHIPPED | OUTPATIENT
Start: 2024-01-21

## 2024-01-21 RX ORDER — AMOXICILLIN 500 MG/1
500 CAPSULE ORAL ONCE
Status: COMPLETED | OUTPATIENT
Start: 2024-01-21 | End: 2024-01-21

## 2024-01-21 RX ADMIN — TOPICAL ANESTHETIC 2 SPRAY: 200 SPRAY DENTAL; PERIODONTAL at 15:05

## 2024-01-21 RX ADMIN — AMOXICILLIN 500 MG: 500 CAPSULE ORAL at 15:03

## 2024-01-21 RX ADMIN — HYDROCODONE BITARTRATE AND ACETAMINOPHEN 1 TABLET: 5; 325 TABLET ORAL at 15:03

## 2024-01-21 RX ADMIN — LIDOCAINE HYDROCHLORIDE 10 ML: 20 SOLUTION ORAL at 15:03

## 2024-01-21 NOTE — ED PROVIDER NOTES
Subjective  History of Present Illness:    This is a 53-year-old female, history of fibromyalgia, hypertension, presenting today for evaluation of dental pain.  Patient reports that she had 3 teeth pulled recently on the lower jaw on 1-17 by [facility] in [place], was not sent home with any pain medicine.  Patient reports it took the most 2 hours to get it out.  She reports worsening pain since the procedure.  Tolerating her own secretions at bedside without difficulty.  Reports that she is having trouble opening her jaw all the way due to the pain on the left lower jaw.  No known discharge.  No facial erythema or swelling.  cannot take ibuprofen secondary to being on a blood thinner.      Nurses Notes reviewed and agree, including vitals, allergies, social history and prior medical history.     REVIEW OF SYSTEMS: All systems reviewed and not pertinent unless noted.  Review of Systems   Constitutional:  Negative for fever.   HENT:  Positive for dental problem.    All other systems reviewed and are negative.      Past Medical History:   Diagnosis Date    Arthritis     Fibromyalgia, primary     GERD (gastroesophageal reflux disease)     History of osteoarthritis 08/01/2016    Hypertension     Primary stabbing headache     Sick sinus syndrome     Vitamin B12 deficiency        Allergies:    Patient has no known allergies.      Past Surgical History:   Procedure Laterality Date    CHOLECYSTECTOMY      COLONOSCOPY  01/09/2020    [facility] - Dr. Katie Melgar - colon tortuous but otherwise normal. 10 year follow up screening recommended.    ENDOSCOPY      ENDOSCOPY N/A 8/28/2023    Procedure: ESOPHAGOGASTRODUODENOSCOPY WITH BIOPSY and DILATION;  Surgeon: Shala Rowe MD;  Location: [facility] ENDOSCOPY;  Service: Gastroenterology;  Laterality: N/A;    OOPHORECTOMY      PACEMAKER IMPLANTATION      PACEMAKER REPLACEMENT N/A 1/3/2024    Procedure: PPM generator change - dual-SJM-hold Xarelto 2 days.;   "Surgeon: Andrei Raphael MD;  Location: [facility] INVASIVE LOCATION;  Service: Cardiology;  Laterality: N/A;  Will need echo day of procedure for hx of MVP. Battery @ [facility] on 12/1/2023. Chronically elevated RV threshold-V paced 1%.    SHOULDER SURGERY      left    SUBTOTAL HYSTERECTOMY      Partial          Social History     Socioeconomic History    Marital status:    Tobacco Use    Smoking status: Never    Smokeless tobacco: Never   Vaping Use    Vaping Use: Never used   Substance and Sexual Activity    Alcohol use: Never    Drug use: Never    Sexual activity: Defer         Family History   Problem Relation Age of Onset    Arthritis Maternal Grandmother     Hypertension Maternal Grandmother     Kidney disease Maternal Grandmother     Breast cancer Paternal Aunt        Objective  Physical Exam:  BP (!) 158/110 (BP Location: Left arm, Patient Position: Sitting)   Pulse 98   Temp 97.9 °F (36.6 °C) (Oral)   Resp 16   Ht 160 cm (63\")   Wt 63 kg (139 lb)   SpO2 98%   BMI 24.62 kg/m²      Physical Exam  Vitals and nursing note reviewed.   Constitutional:       General: She is not in acute distress.     Appearance: Normal appearance. She is normal weight. She is not ill-appearing or toxic-appearing.   HENT:      Head: Normocephalic and atraumatic.      Nose: Nose normal.      Mouth/Throat:      Mouth: Mucous membranes are moist.      Pharynx: Oropharynx is clear.      Comments: Several sutures in place in the left lower jaw, no obvious erythema or discharge or evidence of abscess.  Several teeth have subsequently been removed from the left lower jaw all including likely #17 18 and 19.  Uvula midline nondeviated.  No jaw malocclusion.  Eyes:      Extraocular Movements: Extraocular movements intact.   Cardiovascular:      Pulses: Normal pulses.      Comments: Appears well-perfused  Pulmonary:      Effort: Pulmonary effort is normal.   Abdominal:      General: Abdomen is flat.   Musculoskeletal:         General: " Normal range of motion.      Cervical back: Normal range of motion.   Skin:     General: Skin is warm and dry.      Capillary Refill: Capillary refill takes less than 2 seconds.   Neurological:      General: No focal deficit present.      Mental Status: She is alert and oriented to person, place, and time.   Psychiatric:         Mood and Affect: Mood normal.         Behavior: Behavior normal.         Thought Content: Thought content normal.         Judgment: Judgment normal.             Procedures    ED Course:         Lab Results (last 24 hours)       ** No results found for the last 24 hours. **             No radiology results from the last 24 hrs       MDM      Initial impression of presenting illness: This is a 53-year-old female presenting today for evaluation of dental pain    DDX: includes but is not limited to: Dental pain, dental abscess, postprocedural pain, dental infection, TMJ, lockjaw, others    Patient arrives hemodynamically stable afebrile nontachycardic nonhypoxic nontoxic-appearing with vitals interpreted by myself.     Pertinent features from physical exam: Several sutures in place in the left lower jaw, no obvious erythema or discharge or evidence of abscess.  Several teeth have subsequently been removed from the left lower jaw all including likely #17 18 and 19.  Uvula midline nondeviated.  No jaw malocclusion..  Neck is supple nontender.  No overlying erythema or swelling of the cheek.  Able to tolerate p.o. secretions without difficulty.  I was able to open the patient's jaw however did initiate pain to the lower jaw lower suspicion for lockjaw.    Initial diagnostic plan: No labs or imaging necessary at this time.     Results from initial plan were reviewed and interpreted by me revealing N/A    Diagnostic information from other sources: Record reviewed    Interventions / Re-evaluation: Norco, amoxicillin, dental balls.  Stable for discharge.    Results/clinical rationale were discussed  with patient at bedside.  Recommended close follow-up with her dentist.  Return precautions given.  Will send short course of pain medicine and antibiotics to the patient's pharmacy.    Consultations/Discussion of results with other physicians: N/A    Disposition plan: disharge with dentistry follow-up.  Short course of pain meds and antibiotics and Peridex.  Return precautions given.  Other supportive care discussed.  Patient agreeable plan at bedside.  Will additionally send short course of muscle relaxers  -----    Final diagnoses:   Pain, dental          Shahram Ponce PA-C  01/21/24 1449

## 2024-01-21 NOTE — DISCHARGE INSTRUCTIONS
Follow-up closely with your dentist, I have sent some medications to your pharmacy take these as directed.  Take Tylenol also as needed.  Do not take more than 4000 mg total of Tylenol in a day.  Your pain medicine will contain Tylenol, be cautious about the amount of Tylenol that you take.

## 2024-02-05 RX ORDER — CHLORTHALIDONE 25 MG/1
12.5 TABLET ORAL DAILY
Qty: 15 TABLET | Refills: 11 | OUTPATIENT
Start: 2024-02-05

## 2024-02-06 RX ORDER — FLECAINIDE ACETATE 50 MG/1
50 TABLET ORAL 2 TIMES DAILY
Qty: 60 TABLET | Refills: 11 | Status: SHIPPED | OUTPATIENT
Start: 2024-02-06

## 2024-02-09 RX ORDER — IRBESARTAN 150 MG/1
150 TABLET ORAL NIGHTLY
Qty: 90 TABLET | Refills: 4 | OUTPATIENT
Start: 2024-02-09

## 2024-03-15 ENCOUNTER — TELEPHONE (OUTPATIENT)
Dept: CARDIOLOGY | Facility: CLINIC | Age: 54
End: 2024-03-15
Payer: MEDICARE

## 2024-03-15 NOTE — TELEPHONE ENCOUNTER
Caller: Nely Castrejon    Relationship: Self    Best call back number: [phone] (home)       What is the best time to reach you: ANY    Who are you requesting to speak with (clinical staff, provider,  specific staff member): ANY      What was the call regarding: PATIENT CALLED TO ADVISE SHE RECEIVED A CALL FROM THIS OFFICE ON 3-15-24 AND WAS UNABLE TO ANSWER IT. SHE STATES HER VOICEMAIL IS NOT SET UP. PLEASE CONTACT PATIENT IN REGARDS TO THE PREVIOUS CALL.

## 2024-04-04 NOTE — PROGRESS NOTES
Cardiac Electrophysiology Outpatient Note  [facility] at [facility]    Office Visit     Nely Castrejon  [id number]  04/09/2024    Primary Care Physician: Jie Mayo MD    Referred By: No ref. provider found    Subjective     Chief Complaint   Patient presents with    Atrial Fibrillation    Chest Pain    Edema    Fatigue    Numbness     Cardiac PMH: (Old records have been reviewed and summarized below)  PAF  Tachybradycardia  [place] PPM  Negative MPS 5/2020  Generator change 1/2024  PSVT  PVC's  Remote RFA Dr. Hughes  Reported History of Mitral Valve prolapse, (-) on recent echo however  TTE 1/2024: LVEF 62%, diastolic function normal, RVSP <35, no significant structural or functional VHD  HTN; Controlled, on Toprol, Chlorthalidone, Avapro  GERD, Aciphex  Depression, Anxiety    History of Present Illness:   Nely Castrejon is a 54 y.o. female who presents to my electrophysiology clinic for follow up of sinus node dysfunction status post dual-chamber pacemaker, PVCs status post prior ablation, paroxysmal atrial fibrillation and paroxysmal atrial tachycardia.  She underwent pacemaker generator change in early January.  Since that time, she has continued to have fatigue. This is not new and has been going on for over a year.  She intermittently has a brief pinching chest pain that goes away within 1 to 2 seconds.  She denies any shortness of breath, palpitations, lightheadedness/dizziness, lower extremity edema or syncopal episodes.    Past Medical History:   Diagnosis Date    Arthritis     Fibromyalgia, primary     GERD (gastroesophageal reflux disease)     History of osteoarthritis 08/01/2016    Hypertension     Primary stabbing headache     Sick sinus syndrome     Vitamin B12 deficiency        Past Surgical History:   Procedure Laterality Date    CHOLECYSTECTOMY      COLONOSCOPY  01/09/2020    [facility] - Dr. Katie Melgar - colon tortuous but otherwise  normal. 10 year follow up screening recommended.    ENDOSCOPY      ENDOSCOPY N/A 8/28/2023    Procedure: ESOPHAGOGASTRODUODENOSCOPY WITH BIOPSY and DILATION;  Surgeon: Shala Rowe MD;  Location: [facility] ENDOSCOPY;  Service: Gastroenterology;  Laterality: N/A;    OOPHORECTOMY      PACEMAKER IMPLANTATION      PACEMAKER REPLACEMENT N/A 1/3/2024    Procedure: PPM generator change - dual-SJM-hold Xarelto 2 days.;  Surgeon: Andrei Raphael MD;  Location: [facility] EP INVASIVE LOCATION;  Service: Cardiology;  Laterality: N/A;  Will need echo day of procedure for hx of MVP. Battery @ [facility] on 12/1/2023. Chronically elevated RV threshold-V paced 1%.    SHOULDER SURGERY      left    SUBTOTAL HYSTERECTOMY      Partial        Family History   Problem Relation Age of Onset    Arthritis Maternal Grandmother     Hypertension Maternal Grandmother     Kidney disease Maternal Grandmother     Breast cancer Paternal Aunt        Social History     Socioeconomic History    Marital status:    Tobacco Use    Smoking status: Never    Smokeless tobacco: Never   Vaping Use    Vaping status: Never Used   Substance and Sexual Activity    Alcohol use: Never    Drug use: Never    Sexual activity: Defer         Current Outpatient Medications:     Acetaminophen Extra Strength 500 MG tablet, Take 2 tablets by mouth Every 6 (Six) Hours., Disp: , Rfl:     chlorthalidone (HYGROTON) 25 MG tablet, Take 0.5 tablets by mouth Daily., Disp: 15 tablet, Rfl: 11    Cyanocobalamin (B-12) 1000 MCG capsule, take 1 capsule by mouth daily, Disp: 60 capsule, Rfl: 6    dexlansoprazole (Dexilant) 60 MG capsule, Take 1 capsule by mouth Daily. Indications: Gastroesophageal Reflux Disease, Disp: 90 capsule, Rfl: 3    famotidine (PEPCID) 40 MG tablet, Take 1 tablet by mouth Daily., Disp: , Rfl:     flecainide (TAMBOCOR) 50 MG tablet, Take 1.5 tablets by mouth 2 (Two) Times a Day., Disp: 60 tablet, Rfl: 11    guaiFENesin (Mucinex) 600 MG 12 hr tablet, Take 1 tablet by  "mouth As Needed., Disp: , Rfl:     irbesartan (Avapro) 150 MG tablet, Take 1 tablet by mouth Every Night., Disp: 90 tablet, Rfl: 4    rivaroxaban (Xarelto) 20 MG tablet, Take 1 tablet by mouth Daily With Dinner. Restart on 1/6/24, Disp: 30 tablet, Rfl: 11    vitamin D3 125 MCG (5000 UT) capsule capsule, Take 1 capsule by mouth Daily., Disp: , Rfl:     benzonatate (TESSALON) 100 MG capsule, Take 1 capsule by mouth 3 (Three) Times a Day As Needed for Cough. (Patient not taking: Reported on 4/9/2024), Disp: 30 capsule, Rfl: 0    folic acid-vit B6-vit B12 (FOLBEE) 2.5-25-1 MG tablet tablet, Take  by mouth Daily. (Patient not taking: Reported on 4/9/2024), Disp: , Rfl:     HYDROcodone-acetaminophen (NORCO) 5-325 MG per tablet, Take 1 tablet by mouth Every 6 (Six) Hours As Needed for Moderate Pain. (Patient not taking: Reported on 4/9/2024), Disp: 8 tablet, Rfl: 0    methocarbamol (ROBAXIN) 500 MG tablet, Take 1 tablet by mouth 3 (Three) Times a Day As Needed for Muscle Spasms. (Patient not taking: Reported on 4/9/2024), Disp: 15 tablet, Rfl: 0    Allergies:   No Known Allergies    Objective   Vital Signs: Blood pressure 130/92, pulse 77, height 160 cm (63\"), weight 63 kg (139 lb), SpO2 99%.    PHYSICAL EXAM  General appearance: Awake, alert, cooperative  Head: Normocephalic, without obvious abnormality, atraumatic  Lungs: Clear to ascultation bilaterally  Heart: Regular rate and rhythm, no murmurs, 2+ LE pulses, no lower extremity swelling  Skin: Skin color, turgor normal, no rashes or lesions  Neurologic: Grossly normal     Lab Results   Component Value Date    GLUCOSE 103 (H) 01/03/2024    CALCIUM 9.0 01/03/2024     01/03/2024    K 2.9 (L) 01/03/2024    CO2 27.0 01/03/2024     01/03/2024    BUN 17 01/03/2024    CREATININE 0.89 01/03/2024    EGFRIFAFRI 84 03/31/2021    EGFRIFNONA 69 03/31/2021    BCR 19.1 01/03/2024    ANIONGAP 10.0 01/03/2024     Lab Results   Component Value Date    WBC 6.87 01/03/2024    " HGB 12.6 01/03/2024    HCT 38.4 01/03/2024    MCV 87.7 01/03/2024     01/03/2024     Lab Results   Component Value Date    INR 0.86 (L) 09/28/2021    PROTIME 12.2 09/28/2021     Lab Results   Component Value Date    TSH 2.280 08/11/2023          Results for orders placed during the hospital encounter of 01/03/24    Adult Transthoracic Echo Complete W/ Cont if Necessary Per Protocol    Interpretation Summary    Left ventricular systolic function is normal. Calculated left ventricular EF = 62% Left ventricular ejection fraction appears to be 61 - 65%.    Left ventricular diastolic function was normal.    Estimated right ventricular systolic pressure from tricuspid regurgitation is normal (<35 mmHg).    No significant structural or functional valvular disease.         I personally viewed and interpreted the patient's EKG/Telemetry/lab data      ECG 12 Lead    Date/Time: 4/11/2024 4:17 PM  Performed by: Jose Francisco Jean PA-C    Authorized by: Jose Francisco Jean PA-C  Comparison: compared with previous ECG from 2/21/2023  Similar to previous ECG  Rhythm: sinus rhythm  Rate: normal  BPM: 77  Other findings: T wave abnormality  Other findings comments: Unchanged from previous    Clinical impression: abnormal EKG          Nely Castrejon  reports that she has never smoked. She has never used smokeless tobacco.        Advance Care Planning   Advance Care Planning: ACP discussion was declined by the patient. Patient does not have an advance directive, information provided.     Assessment & Plan    1. Atrial fibrillation with RVR  Device interrogation today showed a <1% burden of mode switching mostly short runs of atrial tachycardia but likely some atrial fibrillation.  We will increase her flecainide to 75 mg twice daily for suppression of A-fib and atrial tach in case these brief runs are contributing to her fatigue.  [place]KG in her hometown for 1124  - ECG 12 Lead; Future    2. Paroxysmal SVT (supraventricular  tachycardia)  Increasing flecainide as above    3. Long term current use of antiarrhythmic medical therapy  EKG today showed QRS 80 ms well within normal ranges.  Increased dose to 75 mg twice daily as above.    4. Chronic anticoagulation  [id number] score = 2.  Continue Xarelto for stroke prophylaxis    5. Tachycardia-bradycardia  S/p DC PPM    6. Pacemaker  Manual device interrogation today reveals a [place] dual-chamber pacemaker with 70% atrial pacing, 3% ventricular pacing and 9 years left on the battery.  Threshold impedance values are acceptable.  <1% AMS longest 24 minutes most under 3 minutes with 30 episodes of high ventricular rates all SVT again brief.  AT/AF alert changed in a Confirmed turned on    7. PVC's (premature ventricular contractions)  S/p remote PVC ablation.  PVC <1% on report         Follow Up:  Return in about 6 months (around 10/9/2024).      Thank you for allowing me to participate in the care of your patient. Please do not hesitate to contact me with additional questions or concerns.      Jose Francisco Jean PA-C  Cardiac Electrophysiology  [facility] / [facility]

## 2024-04-09 ENCOUNTER — OFFICE VISIT (OUTPATIENT)
Dept: CARDIOLOGY | Facility: CLINIC | Age: 54
End: 2024-04-09
Payer: MEDICARE

## 2024-04-09 VITALS
HEART RATE: 77 BPM | HEIGHT: 63 IN | BODY MASS INDEX: 24.63 KG/M2 | WEIGHT: 139 LBS | DIASTOLIC BLOOD PRESSURE: 92 MMHG | SYSTOLIC BLOOD PRESSURE: 130 MMHG | OXYGEN SATURATION: 99 %

## 2024-04-09 DIAGNOSIS — I49.5 TACHYCARDIA-BRADYCARDIA: ICD-10-CM

## 2024-04-09 DIAGNOSIS — Z79.899 LONG TERM CURRENT USE OF ANTIARRHYTHMIC MEDICAL THERAPY: ICD-10-CM

## 2024-04-09 DIAGNOSIS — I49.3 PVC'S (PREMATURE VENTRICULAR CONTRACTIONS): ICD-10-CM

## 2024-04-09 DIAGNOSIS — I48.91 ATRIAL FIBRILLATION WITH RVR: Primary | ICD-10-CM

## 2024-04-09 DIAGNOSIS — Z95.0 PACEMAKER: ICD-10-CM

## 2024-04-09 DIAGNOSIS — I47.10 PAROXYSMAL SVT (SUPRAVENTRICULAR TACHYCARDIA): ICD-10-CM

## 2024-04-09 DIAGNOSIS — Z79.01 CHRONIC ANTICOAGULATION: ICD-10-CM

## 2024-04-09 RX ORDER — GUAIFENESIN 600 MG/1
600 TABLET, EXTENDED RELEASE ORAL AS NEEDED
COMMUNITY
Start: 2023-11-01

## 2024-04-09 RX ORDER — PSEUDOEPHED/ACETAMINOPH/DIPHEN 30MG-500MG
2 TABLET ORAL EVERY 6 HOURS
COMMUNITY
Start: 2023-12-27

## 2024-04-09 RX ORDER — FLECAINIDE ACETATE 50 MG/1
75 TABLET ORAL 2 TIMES DAILY
Qty: 60 TABLET | Refills: 11 | Status: SHIPPED | OUTPATIENT
Start: 2024-04-09

## 2024-04-09 RX ORDER — IRBESARTAN 150 MG/1
150 TABLET ORAL NIGHTLY
Qty: 90 TABLET | Refills: 4 | Status: SHIPPED | OUTPATIENT
Start: 2024-04-09

## 2024-04-11 PROBLEM — I47.10 PAROXYSMAL SVT (SUPRAVENTRICULAR TACHYCARDIA): Status: ACTIVE | Noted: 2024-04-11

## 2024-04-11 PROBLEM — Z79.899 LONG TERM CURRENT USE OF ANTIARRHYTHMIC MEDICAL THERAPY: Status: ACTIVE | Noted: 2024-04-11

## 2024-04-11 PROBLEM — Z79.01 CHRONIC ANTICOAGULATION: Status: ACTIVE | Noted: 2024-04-11

## 2024-04-11 PROBLEM — I49.3 PVC'S (PREMATURE VENTRICULAR CONTRACTIONS): Status: ACTIVE | Noted: 2024-04-11

## 2024-05-09 ENCOUNTER — OFFICE VISIT (OUTPATIENT)
Dept: INTERNAL MEDICINE | Facility: CLINIC | Age: 54
End: 2024-05-09
Payer: MEDICARE

## 2024-05-09 VITALS
DIASTOLIC BLOOD PRESSURE: 90 MMHG | HEIGHT: 63 IN | SYSTOLIC BLOOD PRESSURE: 130 MMHG | HEART RATE: 76 BPM | TEMPERATURE: 97.3 F | WEIGHT: 139 LBS | OXYGEN SATURATION: 98 % | RESPIRATION RATE: 18 BRPM | BODY MASS INDEX: 24.63 KG/M2

## 2024-05-09 DIAGNOSIS — B89 PARASITIC INFESTATION OF ORBIT: ICD-10-CM

## 2024-05-09 DIAGNOSIS — R19.5 LOOSE STOOLS: ICD-10-CM

## 2024-05-09 DIAGNOSIS — B02.9 HERPES ZOSTER WITHOUT COMPLICATION: ICD-10-CM

## 2024-05-09 DIAGNOSIS — N64.4 BREAST PAIN: Primary | ICD-10-CM

## 2024-05-09 DIAGNOSIS — K92.1 BLOOD IN STOOL: ICD-10-CM

## 2024-05-09 DIAGNOSIS — R19.5 ABNORMAL FECES: ICD-10-CM

## 2024-05-09 DIAGNOSIS — B89 PARASITE INFECTION: ICD-10-CM

## 2024-05-09 PROCEDURE — 99213 OFFICE O/P EST LOW 20 MIN: CPT | Performed by: NURSE PRACTITIONER

## 2024-05-09 PROCEDURE — 3080F DIAST BP >= 90 MM HG: CPT | Performed by: NURSE PRACTITIONER

## 2024-05-09 PROCEDURE — 3075F SYST BP GE 130 - 139MM HG: CPT | Performed by: NURSE PRACTITIONER

## 2024-05-09 PROCEDURE — 1126F AMNT PAIN NOTED NONE PRSNT: CPT | Performed by: NURSE PRACTITIONER

## 2024-05-10 PROCEDURE — 87177 OVA AND PARASITES SMEARS: CPT | Performed by: NURSE PRACTITIONER

## 2024-05-10 PROCEDURE — 87209 SMEAR COMPLEX STAIN: CPT | Performed by: NURSE PRACTITIONER

## 2024-05-10 PROCEDURE — 82270 OCCULT BLOOD FECES: CPT | Performed by: STUDENT IN AN ORGANIZED HEALTH CARE EDUCATION/TRAINING PROGRAM

## 2024-05-11 LAB — HEMOCCULT STL QL: NEGATIVE

## 2024-05-15 LAB
O+P SPEC MICRO: NORMAL
O+P STL CONC: NORMAL

## 2024-05-16 DIAGNOSIS — B89 PARASITE INFECTION: Primary | ICD-10-CM

## 2024-06-11 ENCOUNTER — TRANSCRIBE ORDERS (OUTPATIENT)
Dept: ADMINISTRATIVE | Facility: HOSPITAL | Age: 54
End: 2024-06-11
Payer: MEDICARE

## 2024-06-11 ENCOUNTER — TELEPHONE (OUTPATIENT)
Dept: CARDIOLOGY | Facility: CLINIC | Age: 54
End: 2024-06-11
Payer: MEDICARE

## 2024-06-11 DIAGNOSIS — K21.9 CHRONIC GERD: Primary | ICD-10-CM

## 2024-06-11 NOTE — TELEPHONE ENCOUNTER
Dr. Cornell is requesting a cardiac risk assessment for the patient to have a Fundoplication. How long should she hold Xarelto prior to?    P:[phone]  F:[phone]

## 2024-06-19 VITALS — WEIGHT: 137 LBS | HEIGHT: 63 IN | BODY MASS INDEX: 24.27 KG/M2

## 2024-06-19 RX ORDER — LANOLIN ALCOHOL/MO/W.PET/CERES
50 CREAM (GRAM) TOPICAL DAILY
COMMUNITY

## 2024-06-20 ENCOUNTER — HOSPITAL ENCOUNTER (OUTPATIENT)
Facility: HOSPITAL | Age: 54
Setting detail: HOSPITAL OUTPATIENT SURGERY
Discharge: HOME | End: 2024-06-20
Attending: SURGERY | Admitting: SURGERY
Payer: MEDICARE

## 2024-06-20 PROCEDURE — 91010 ESOPHAGUS MOTILITY STUDY: CPT | Performed by: SURGERY

## 2024-06-20 PROCEDURE — 91034 GASTROESOPHAGEAL REFLUX TEST: CPT | Performed by: SURGERY

## 2024-06-20 RX ORDER — LIDOCAINE HYDROCHLORIDE 20 MG/ML
SOLUTION OROPHARYNGEAL AS NEEDED
Status: DISCONTINUED | OUTPATIENT
Start: 2024-06-20 | End: 2024-06-20 | Stop reason: HOSPADM

## 2024-07-12 PROCEDURE — 88305 TISSUE EXAM BY PATHOLOGIST: CPT

## 2024-07-15 ENCOUNTER — LAB REQUISITION (OUTPATIENT)
Dept: LAB | Facility: HOSPITAL | Age: 54
End: 2024-07-15
Payer: MEDICARE

## 2024-07-15 DIAGNOSIS — K21.9 GASTRO-ESOPHAGEAL REFLUX DISEASE WITHOUT ESOPHAGITIS: ICD-10-CM

## 2024-07-16 LAB — REF LAB TEST METHOD: NORMAL

## 2024-08-07 ENCOUNTER — HOSPITAL ENCOUNTER (OUTPATIENT)
Dept: GENERAL RADIOLOGY | Facility: HOSPITAL | Age: 54
Discharge: HOME | End: 2024-08-07
Admitting: SURGERY
Payer: MEDICARE

## 2024-08-07 ENCOUNTER — HOSPITAL ENCOUNTER (OUTPATIENT)
Dept: NUCLEAR MEDICINE | Facility: HOSPITAL | Age: 54
Discharge: HOME | End: 2024-08-07
Payer: MEDICARE

## 2024-08-07 DIAGNOSIS — K21.9 CHRONIC GERD: ICD-10-CM

## 2024-08-07 PROCEDURE — 74220 X-RAY XM ESOPHAGUS 1CNTRST: CPT

## 2024-08-07 PROCEDURE — A9541 TC99M SULFUR COLLOID: HCPCS | Performed by: SURGERY

## 2024-08-07 PROCEDURE — 78264 GASTRIC EMPTYING IMG STUDY: CPT

## 2024-08-07 PROCEDURE — 0 TECHNETIUM SULFUR COLLOID: Performed by: SURGERY

## 2024-08-07 RX ADMIN — BARIUM SULFATE 183 ML: 960 POWDER, FOR SUSPENSION ORAL at 11:48

## 2024-08-07 RX ADMIN — TECHNETIUM TC 99M SULFUR COLLOID 1 DOSE: KIT at 09:22

## 2024-08-12 ENCOUNTER — OFFICE VISIT (OUTPATIENT)
Dept: INTERNAL MEDICINE | Facility: CLINIC | Age: 54
End: 2024-08-12
Payer: MEDICARE

## 2024-08-12 VITALS
RESPIRATION RATE: 16 BRPM | SYSTOLIC BLOOD PRESSURE: 130 MMHG | DIASTOLIC BLOOD PRESSURE: 84 MMHG | WEIGHT: 138.4 LBS | TEMPERATURE: 97.6 F | HEIGHT: 63 IN | OXYGEN SATURATION: 100 % | BODY MASS INDEX: 24.52 KG/M2 | HEART RATE: 67 BPM

## 2024-08-12 DIAGNOSIS — I10 PRIMARY HYPERTENSION: ICD-10-CM

## 2024-08-12 DIAGNOSIS — R79.89 HIGH SERUM VITAMIN D: ICD-10-CM

## 2024-08-12 DIAGNOSIS — R73.9 HYPERGLYCEMIA: ICD-10-CM

## 2024-08-12 DIAGNOSIS — M54.2 NECK PAIN ON LEFT SIDE: ICD-10-CM

## 2024-08-12 DIAGNOSIS — R05.3 CHRONIC COUGH: ICD-10-CM

## 2024-08-12 DIAGNOSIS — L28.2 PRURITIC RASH: ICD-10-CM

## 2024-08-12 DIAGNOSIS — I47.10 PAROXYSMAL SVT (SUPRAVENTRICULAR TACHYCARDIA): ICD-10-CM

## 2024-08-12 DIAGNOSIS — R79.9 ABNORMAL BLOOD CHEMISTRY: ICD-10-CM

## 2024-08-12 DIAGNOSIS — E78.2 MIXED HYPERLIPIDEMIA: ICD-10-CM

## 2024-08-12 DIAGNOSIS — R53.82 CHRONIC FATIGUE: ICD-10-CM

## 2024-08-12 DIAGNOSIS — M89.9 DISORDER OF BONE, UNSPECIFIED: ICD-10-CM

## 2024-08-12 DIAGNOSIS — Z00.00 MEDICARE ANNUAL WELLNESS VISIT, SUBSEQUENT: Primary | ICD-10-CM

## 2024-08-12 DIAGNOSIS — Z51.81 MEDICATION MONITORING ENCOUNTER: ICD-10-CM

## 2024-08-12 DIAGNOSIS — N64.4 BREAST PAIN: ICD-10-CM

## 2024-08-12 DIAGNOSIS — Z23 NEED FOR TDAP VACCINATION: ICD-10-CM

## 2024-08-12 DIAGNOSIS — E53.1 VITAMIN B6 DEFICIENCY: ICD-10-CM

## 2024-08-12 DIAGNOSIS — K21.9 GASTROESOPHAGEAL REFLUX DISEASE, UNSPECIFIED WHETHER ESOPHAGITIS PRESENT: ICD-10-CM

## 2024-08-12 DIAGNOSIS — I48.0 PAROXYSMAL ATRIAL FIBRILLATION: ICD-10-CM

## 2024-08-12 PROBLEM — J01.40 SUBACUTE PANSINUSITIS: Status: RESOLVED | Noted: 2021-10-05 | Resolved: 2024-08-12

## 2024-08-12 PROBLEM — M70.62 TROCHANTERIC BURSITIS OF LEFT HIP: Status: RESOLVED | Noted: 2021-02-19 | Resolved: 2024-08-12

## 2024-08-12 PROCEDURE — G0439 PPPS, SUBSEQ VISIT: HCPCS | Performed by: FAMILY MEDICINE

## 2024-08-12 PROCEDURE — 3079F DIAST BP 80-89 MM HG: CPT | Performed by: FAMILY MEDICINE

## 2024-08-12 PROCEDURE — 99214 OFFICE O/P EST MOD 30 MIN: CPT | Performed by: FAMILY MEDICINE

## 2024-08-12 PROCEDURE — 99396 PREV VISIT EST AGE 40-64: CPT | Performed by: FAMILY MEDICINE

## 2024-08-12 PROCEDURE — 1126F AMNT PAIN NOTED NONE PRSNT: CPT | Performed by: FAMILY MEDICINE

## 2024-08-12 PROCEDURE — 1159F MED LIST DOCD IN RCRD: CPT | Performed by: FAMILY MEDICINE

## 2024-08-12 PROCEDURE — 3075F SYST BP GE 130 - 139MM HG: CPT | Performed by: FAMILY MEDICINE

## 2024-08-12 PROCEDURE — 1170F FXNL STATUS ASSESSED: CPT | Performed by: FAMILY MEDICINE

## 2024-08-12 PROCEDURE — 1160F RVW MEDS BY RX/DR IN RCRD: CPT | Performed by: FAMILY MEDICINE

## 2024-08-12 NOTE — PROGRESS NOTES
Subjective   The ABCs of the Annual Wellness Visit  Medicare Wellness Visit      Nely Castrejon is a 54 y.o. patient who presents for a Medicare Wellness Visit.    The following portions of the patient's history were reviewed and   updated as appropriate: allergies, current medications, past family history, past medical history, past social history, past surgical history, and problem list.    Compared to one year ago, the patient's physical   health is worse.  Compared to one year ago, the patient's mental   health is the same.    Recent Hospitalizations:  She was admitted within the past 365 days at [facility].     Current Medical Providers:  Patient Care Team:  Jie Mayo MD as PCP - General (Family Medicine)  Jm Mills MD as Consulting Physician (Cardiology)  Andrei Raphael MD as Consulting Physician (Cardiac Electrophysiology)  Shala Rowe MD as Surgeon (General Surgery)  Corby Owens MD as Consulting Physician (Rheumatology)  Eduar Cornell MD as Consulting Physician (General Surgery)  Quirino Craig MD as Consulting Physician (Otolaryngology)    Outpatient Medications Prior to Visit   Medication Sig Dispense Refill    chlorthalidone (HYGROTON) 25 MG tablet Take 0.5 tablets by mouth Daily. (Patient taking differently: Take 0.5 tablets by mouth Every Morning.) 15 tablet 11    Cyanocobalamin (B-12) 1000 MCG capsule take 1 capsule by mouth daily (Patient taking differently: Take 1 capsule by mouth Daily.) 60 capsule 6    dexlansoprazole (Dexilant) 60 MG capsule Take 1 capsule by mouth Daily. Indications: Gastroesophageal Reflux Disease 90 capsule 3    famotidine (PEPCID) 40 MG tablet Take 1 tablet by mouth Daily.      flecainide (TAMBOCOR) 50 MG tablet Take 1.5 tablets by mouth 2 (Two) Times a Day. (Patient taking differently: Take 1 tablet by mouth 2 (Two) Times a Day.) 60 tablet 11    irbesartan (Avapro) 150 MG tablet Take 1 tablet by mouth Every  "Night. 90 tablet 4    rivaroxaban (Xarelto) 20 MG tablet Take 1 tablet by mouth Daily With Dinner. Restart on 1/6/24 30 tablet 11    vitamin B-6 (PYRIDOXINE) 50 MG tablet Take 1 tablet by mouth Daily.      vitamin D3 125 MCG (5000 UT) capsule capsule Take 1 capsule by mouth Daily.      rivaroxaban (Xarelto) 20 MG tablet Take 1 tablet by mouth Daily.       No facility-administered medications prior to visit.     No opioid medication identified on active medication list. I have reviewed chart for other potential  high risk medication/s and harmful drug interactions in the elderly.      Aspirin is not on active medication list.  Aspirin use is contraindicated for this patient due to: current use of Xarelto.  .    Patient Active Problem List   Diagnosis    Cervical spondylosis    Lumbar radiculopathy    Lumbar spondylosis    Headache, migraine    Myofascial pain syndrome    Pain syndrome, chronic    Paroxysmal hemicrania    History of sick sinus syndrome    Chronic insomnia    Chronic pain syndrome    Primary hypertension    GERD (gastroesophageal reflux disease)    Pacemaker    Paroxysmal atrial fibrillation    Tachycardia-bradycardia    Dysphagia    Inflammation of sacroiliac joint    Sick sinus syndrome    Long term current use of antiarrhythmic medical therapy    PVC's (premature ventricular contractions)    Paroxysmal SVT (supraventricular tachycardia)    Chronic anticoagulation    Mixed hyperlipidemia     Advance Care Planning Advance Directive is not on file.  ACP discussion was held with the patient during this visit. Patient does not have an advance directive, declines further assistance.            Objective   Vitals:    08/12/24 0903   BP: 130/84   BP Location: Left arm   Patient Position: Sitting   Cuff Size: Adult   Pulse: 67   Resp: 16   Temp: 97.6 °F (36.4 °C)   TempSrc: Temporal   SpO2: 100%   Weight: 62.8 kg (138 lb 6.4 oz)   Height: 160 cm (63\")   PainSc: 0-No pain       Estimated body mass index is " "24.52 kg/m² as calculated from the following:    Height as of this encounter: 160 cm (63\").    Weight as of this encounter: 62.8 kg (138 lb 6.4 oz).    BMI is within normal parameters. No other follow-up for BMI required.       Does the patient have evidence of cognitive impairment? No                                                                                                Health  Risk Assessment    Smoking Status:  Social History     Tobacco Use   Smoking Status Never    Passive exposure: Current   Smokeless Tobacco Never     Alcohol Consumption:  Social History     Substance and Sexual Activity   Alcohol Use Never       Fall Risk Screen  STEADI Fall Risk Assessment was completed, and patient is at LOW risk for falls.Assessment completed on:2024    Depression Screenin/12/2024     9:02 AM   PHQ-2/PHQ-9 Depression Screening   Little Interest or Pleasure in Doing Things 0-->not at all   Feeling Down, Depressed or Hopeless 0-->not at all   PHQ-9: Brief Depression Severity Measure Score 0     Health Habits and Functional and Cognitive Screenin/12/2024     9:00 AM   Functional & Cognitive Status   Do you have difficulty preparing food and eating? No   Do you have difficulty bathing yourself, getting dressed or grooming yourself? No   Do you have difficulty using the toilet? No   Do you have difficulty moving around from place to place? No   Do you have trouble with steps or getting out of a bed or a chair? Yes   Current Diet Well Balanced Diet   Dental Exam Up to date   Eye Exam Up to date   Exercise (times per week) 0 times per week   Current Exercises Include No Regular Exercise   Do you need help using the phone?  No   Are you deaf or do you have serious difficulty hearing?  No   Do you need help to go to places out of walking distance? No   Do you need help shopping? No   Do you need help preparing meals?  No   Do you need help with housework?  No   Do you need help with laundry? No   Do " you need help taking your medications? Yes   Do you need help managing money? Yes   Do you ever drive or ride in a car without wearing a seat belt? No   Have you felt unusual stress, anger or loneliness in the last month? No   Who do you live with? Spouse   If you need help, do you have trouble finding someone available to you? No   Have you been bothered in the last four weeks by sexual problems? No   Do you have difficulty concentrating, remembering or making decisions? Yes           Age-appropriate Screening Schedule:  Refer to the list below for future screening recommendations based on patient's age, sex and/or medical conditions. Orders for these recommended tests are listed in the plan section. The patient has been provided with a written plan.    Health Maintenance List  Health Maintenance   Topic Date Due    LIPID PANEL  08/11/2024    INFLUENZA VACCINE  08/01/2024    COVID-19 Vaccine (5 - 2023-24 season) 08/12/2024 (Originally 9/1/2023)    TDAP/TD VACCINES (1 - Tdap) 08/12/2024 (Originally 3/16/1989)    ZOSTER VACCINE (1 of 2) 08/12/2024 (Originally 3/16/2019)    MAMMOGRAM  11/16/2024    ANNUAL WELLNESS VISIT  08/12/2025    COLORECTAL CANCER SCREENING  01/09/2030    HEPATITIS C SCREENING  Completed    Pneumococcal Vaccine 0-64  Aged Out                                                                                                                                                CMS Preventative Services Quick Reference  Risk Factors Identified During Encounter  Polypharmacy: Medication List reviewed and Medications are appropriate for patient  Immunizations: Tdap sent to pharmacy    The above risks/problems have been discussed with the patient.  Pertinent information has been shared with the patient in the After Visit Summary.  An After Visit Summary and PPPS were made available to the patient.    Follow Up:   Next Medicare Wellness visit to be scheduled in 1 year.         Additional E&M Note during same  "encounter follows:  Patient has additional, significant, and separately identifiable condition(s)/problem(s) that require work above and beyond the Medicare Wellness Visit     Chief Complaint  Medicare Wellness-subsequent (AWV and preventive care ) and Rash (Follow up on rash on right side chest, been there since last seen a few months back, itchy but not painful )    Subjective   Had testing for Dr. Cornell for reflux  Possible cause of chronic cough which continues    Rash on chest wall right side from last visit not better  No pain no burning just itching.    Not currently in with general cardiology  Followed by EP  Previously saw Dr. Frost and would like to return to see him.  Aware he is in [place]  Having some pain up left side of neck    Continues to have breast pain  Patient was expecting ultrasound of breast last time mammogram completed but was not performed  Would prefer to have to be thorough.      Nely is also being seen today for an annual adult preventative physical exam.  and Nely is also being seen today for additional medical problem/s.                Objective   Vital Signs:  /84 (BP Location: Left arm, Patient Position: Sitting, Cuff Size: Adult)   Pulse 67   Temp 97.6 °F (36.4 °C) (Temporal)   Resp 16   Ht 160 cm (63\")   Wt 62.8 kg (138 lb 6.4 oz)   SpO2 100%   BMI 24.52 kg/m²   Physical Exam  Vitals and nursing note reviewed.   Constitutional:       General: She is not in acute distress.     Appearance: Normal appearance. She is well-developed and well-groomed. She is not ill-appearing, toxic-appearing or diaphoretic.   HENT:      Head: Normocephalic and atraumatic.      Right Ear: Hearing, tympanic membrane, ear canal and external ear normal.      Left Ear: Hearing, tympanic membrane, ear canal and external ear normal.   Eyes:      General: Lids are normal. No scleral icterus.     Extraocular Movements: Extraocular movements intact.   Neck:      Trachea: Phonation normal. "   Cardiovascular:      Rate and Rhythm: Normal rate and regular rhythm.   Pulmonary:      Effort: Pulmonary effort is normal.      Breath sounds: Normal breath sounds.      Comments: Occasional cough  Musculoskeletal:      Cervical back: Neck supple.   Skin:     General: Skin is warm.      Coloration: Skin is not jaundiced or pale.      Findings: Rash (patch of erythematous scabbed excoriated papules on chest right side) present.          Neurological:      General: No focal deficit present.      Mental Status: She is alert and oriented to person, place, and time.      Motor: Motor function is intact.   Psychiatric:         Attention and Perception: Attention and perception normal.         Mood and Affect: Mood and affect normal.         Speech: Speech normal.         Behavior: Behavior normal. Behavior is cooperative.         Thought Content: Thought content normal.         Cognition and Memory: Cognition and memory normal.         Judgment: Judgment normal.         The following data was reviewed by: Jie Mayo MD on 08/12/2024:  HISTORY AND PHYSICAL - SCAN by [employer], [place] (06/20/2024 00:00) (Dr. Cornell regarding GERD)    1/3/24 normal magnesium     Labs from 8/11/23 reviewed. Vitamin B12, folate within normal limits. TSH, free T4 within normal limits.  Component      Latest Ref Rng 8/11/2023   Vitamin B6      3.4 - 65.2 ug/L 4.7        Component      Latest Ref Rng 8/11/2023   25 Hydroxy, Vitamin D      30.0 - 100.0 ng/mL 103.0 (H)       Lab Results   Component Value Date    CHLPL 219 (H) 08/11/2023    TRIG 99 08/11/2023    HDL 79 08/11/2023     (H) 08/11/2023      Lab Results   Component Value Date    HGBA1C 5.7 (H) 08/11/2023    HGBA1C 5.60 03/31/2021    HGBA1C 5.70 (H) 07/21/2020            Assessment and Plan       Diagnoses and all orders for this visit:    1. Medicare annual wellness visit, subsequent (Primary)    2. Chronic fatigue  Comments:  get through Dr. Cornell's work up. if does  not improve needs sleep study  Orders:  -     CBC (No Diff)  -     Vitamin D,25-Hydroxy  -     Vitamin B6  -     Vitamin B12 & Folate  -     Iron Profile  -     TSH Rfx On Abnormal To Free T4    3. Chronic cough  Comments:  follow up with Dr. Cornell as scheduled this week, discuss various studies recently completed    4. Gastroesophageal reflux disease, unspecified whether esophagitis present  Comments:  abnormal studies ordered by Dr. Cornell follow up as scheduled, on PPI    5. Breast pain  -     Mammo Diagnostic Digital Tomosynthesis Bilateral With CAD; Future  -     US breast bilateral complete; Future    6. Primary hypertension  -     CBC (No Diff)  -     Comprehensive Metabolic Panel    7. Mixed hyperlipidemia  -     Lipid Panel  -     Comprehensive Metabolic Panel    8. Neck pain on left side  Comments:  discuss with cardiology  Orders:  -     Mammo Diagnostic Digital Tomosynthesis Bilateral With CAD; Future  -     US breast bilateral complete; Future  -     Ambulatory Referral to Cardiology    9. Paroxysmal atrial fibrillation  -     CBC (No Diff)  -     Comprehensive Metabolic Panel  -     Ambulatory Referral to Cardiology    10. Paroxysmal SVT (supraventricular tachycardia)  -     CBC (No Diff)  -     Comprehensive Metabolic Panel    11. Hyperglycemia  Comments:  two out of last three a1c levels in prediabetic range (5.7)  Orders:  -     Hemoglobin A1c  -     Comprehensive Metabolic Panel    12. Vitamin B6 deficiency  -     Vitamin B6    13. High serum vitamin D  -     Vitamin D,25-Hydroxy    14. Abnormal blood chemistry  -     Hemoglobin A1c  -     Lipid Panel  -     CBC (No Diff)  -     Vitamin D,25-Hydroxy  -     Vitamin B6  -     Comprehensive Metabolic Panel  -     Vitamin B12 & Folate  -     Iron Profile  -     TSH Rfx On Abnormal To Free T4    15. Medication monitoring encounter  -     Hemoglobin A1c  -     Lipid Panel  -     CBC (No Diff)  -     Vitamin D,25-Hydroxy  -     Vitamin B6  -      "Comprehensive Metabolic Panel  -     Vitamin B12 & Folate  -     Iron Profile  -     TSH Rfx On Abnormal To Free T4    16. Pruritic rash  -     fluocinonide (LIDEX) 0.05 % cream; Apply 1 Application topically to the appropriate area as directed 2 (Two) Times a Day.  Dispense: 60 g; Refill: 0    17. Disorder of bone, unspecified  -     Vitamin D,25-Hydroxy    18. Need for Tdap vaccination  -     Tdap (ADACEL) 5-2-15.5 LF-MCG/0.5 injection; Inject 0.5 mL into the appropriate muscle as directed by prescriber 1 (One) Time for 1 dose.  Dispense: 0.5 mL; Refill: 0          Orders Placed This Encounter   Procedures    Mammo Diagnostic Digital Tomosynthesis Bilateral With CAD     Standing Status:   Future     Standing Expiration Date:   8/12/2025     Scheduling Instructions:      Needs ultrasound done     Order Specific Question:   Is an Ultrasound Breast required?  If 'Yes\", Please enter an order for the US Breast as well.     Answer:   Yes     Order Specific Question:   Reason for Exam:     Answer:   breast pain bilateral     Order Specific Question:   Release to patient     Answer:   Routine Release [[id number]]    US breast bilateral complete     Standing Status:   Future     Standing Expiration Date:   8/12/2025     Order Specific Question:   Reason for Exam:     Answer:   breast pain bilateral     Order Specific Question:   Release to patient     Answer:   Routine Release [[id number]]    Hemoglobin A1c     Order Specific Question:   Release to patient     Answer:   Routine Release [[id number]]    Lipid Panel     Order Specific Question:   [facility] Has the patient fasted?     Answer:   Yes     Order Specific Question:   Release to patient     Answer:   Routine Release [[id number]]    CBC (No Diff)     Order Specific Question:   Release to patient     Answer:   Routine Release [[id number]]    Vitamin D,25-Hydroxy     Order Specific Question:   Release to patient     Answer:   Routine Release [[id number]]    Vitamin B6    "  Order Specific Question:   Release to patient     Answer:   Routine Release [[id number]]    Comprehensive Metabolic Panel     Order Specific Question:   Release to patient     Answer:   Routine Release [[id number]]    Vitamin B12 & Folate     Order Specific Question:   Release to patient     Answer:   Routine Release [[id number]]    Iron Profile     Order Specific Question:   Release to patient     Answer:   Routine Release [[id number]]    TSH Rfx On Abnormal To Free T4     Order Specific Question:   Release to patient     Answer:   Routine Release [[id number]]    Ambulatory Referral to Cardiology     Referral Priority:   Routine     Referral Type:   Consultation     Referral Reason:   Specialty Services Required     Referred to Provider:   Cullen Frost MD     Requested Specialty:   Cardiology     Number of Visits Requested:   1     New Medications Ordered This Visit   Medications    fluocinonide (LIDEX) 0.05 % cream     Sig: Apply 1 Application topically to the appropriate area as directed 2 (Two) Times a Day.     Dispense:  60 g     Refill:  0    Tdap (ADACEL) 5-2-15.5 LF-MCG/0.5 injection     Sig: Inject 0.5 mL into the appropriate muscle as directed by prescriber 1 (One) Time for 1 dose.     Dispense:  0.5 mL     Refill:  0        I spent 40 minutes caring for Nely on this date of service. This time includes time spent by me in the following activities:preparing for the visit, reviewing tests, performing a medically appropriate examination and/or evaluation , counseling and educating the patient/family/caregiver, ordering medications, tests, or procedures, referring and communicating with other health care professionals , and documenting information in the medical record    I spent 30 minutes on the separately reported service of [id number]. This time is not included in the time used to support the E/M service also reported today.     Follow Up   Return in about 1 year (around 8/13/2025) for Medicare  Wellness, sooner if needed.  Patient was given instructions and counseling regarding her condition or for health maintenance advice. Please see specific information pulled into the AVS if appropriate.

## 2024-08-12 NOTE — PATIENT INSTRUCTIONS
"  Medicare Wellness  Personal Prevention Plan of Service     Date of Office Visit:    Encounter Provider:  Jie Mayo MD  Place of Service:  [facility]  Patient Name: Nely Castrejon  :  1970    As part of the Medicare Wellness portion of your visit today, we are providing you with this personalized preventive plan of services (PPPS). This plan is based upon recommendations of the [place] Preventive Services Task Force (USPSTF) and the Advisory Committee on Immunization Practices (ACIP).    This lists the preventive care services that should be considered, and provides dates of when you are due. Items listed as completed are up-to-date and do not require any further intervention.    Health Maintenance   Topic Date Due    ANNUAL WELLNESS VISIT  2024    LIPID PANEL  2024    INFLUENZA VACCINE  2024    COVID-19 Vaccine ( - - season) 2024 (Originally 2023)    TDAP/TD VACCINES (1 - Tdap) 2024 (Originally 3/16/1988)    ZOSTER VACCINE (1 of 2) 2024 (Originally 3/16/2019)    MAMMOGRAM  2024    COLORECTAL CANCER SCREENING  2030    HEPATITIS C SCREENING  Completed    Pneumococcal Vaccine 0-64  Aged Out       Orders Placed This Encounter   Procedures    Mammo Diagnostic Digital Tomosynthesis Bilateral With CAD     Standing Status:   Future     Standing Expiration Date:   2025     Scheduling Instructions:      Needs ultrasound done     Order Specific Question:   Is an Ultrasound Breast required?  If 'Yes\", Please enter an order for the US Breast as well.     Answer:   Yes     Order Specific Question:   Reason for Exam:     Answer:   breast pain bilateral     Order Specific Question:   Release to patient     Answer:   Routine Release [[id number]]    US breast bilateral complete     Standing Status:   Future     Standing Expiration Date:   2025     Order Specific Question:   Reason for Exam:     Answer:   " breast pain bilateral     Order Specific Question:   Release to patient     Answer:   Routine Release [[id number]]    Hemoglobin A1c     Order Specific Question:   Release to patient     Answer:   Routine Release [[id number]]    Lipid Panel     Order Specific Question:   [facility] Has the patient fasted?     Answer:   Yes     Order Specific Question:   Release to patient     Answer:   Routine Release [1400000002]    CBC (No Diff)     Order Specific Question:   Release to patient     Answer:   Routine Release [1400000002]    Vitamin D,25-Hydroxy     Order Specific Question:   Release to patient     Answer:   Routine Release [1400000002]    Vitamin B6     Order Specific Question:   Release to patient     Answer:   Routine Release [[id number]]    Comprehensive Metabolic Panel     Order Specific Question:   Release to patient     Answer:   Routine Release [1400000002]    Vitamin B12 & Folate     Order Specific Question:   Release to patient     Answer:   Routine Release [1400000002]    Iron Profile     Order Specific Question:   Release to patient     Answer:   Routine Release [1400000002]    TSH Rfx On Abnormal To Free T4     Order Specific Question:   Release to patient     Answer:   Routine Release [[id number]]    Ambulatory Referral to Cardiology     Referral Priority:   Routine     Referral Type:   Consultation     Referral Reason:   Specialty Services Required     Referred to Provider:   Cullen Frost MD     Requested Specialty:   Cardiology     Number of Visits Requested:   1       Return in about 1 year (around 8/13/2025) for Medicare Wellness, sooner if needed.        Health Maintenance for Postmenopausal Women    Menopause is a normal process in which your ability to get pregnant comes to an end. This process happens slowly over many months or years, usually between the ages of 48 and 55. Menopause is complete when you have missed your menstrual period for 12 months.    It is important to talk with  your health care provider about some of the most common conditions that affect women after menopause (postmenopausal women). These include heart disease, cancer, and bone loss (osteoporosis). Adopting a healthy lifestyle and getting preventive care can help to promote your health and wellness. The actions you take can also lower your chances of developing some of these common conditions.    What are the signs and symptoms of menopause?  During menopause, you may have the following symptoms:  Hot flashes. These can be moderate or severe.  Night sweats.  Decrease in sex drive.  Mood swings.  Headaches.  Tiredness (fatigue).  Irritability.  Memory problems.  Problems falling asleep or staying asleep.    Talk with your health care provider about treatment options for your symptoms.    Do I need hormone replacement therapy?  Hormone replacement therapy is effective in treating symptoms that are caused by menopause, such as hot flashes and night sweats.  Hormone replacement carries certain risks, especially as you become older. If you are thinking about using estrogen or estrogen with progestin, discuss the benefits and risks with your health care provider.    How can I reduce my risk for heart disease and stroke?  The risk of heart disease, heart attack, and stroke increases as you age. One of the causes may be a change in the body's hormones during menopause. This can affect how your body uses dietary fats, triglycerides, and cholesterol. Heart attack and stroke are medical emergencies. There are many things that you can do to help prevent heart disease and stroke.  Watch your blood pressure  High blood pressure causes heart disease and increases the risk of stroke. This is more likely to develop in people who have high blood pressure readings or are overweight.  Have your blood pressure checked:  Every 3-5 years if you are 18-39 years of age.  Every year if you are 40 years old or older.    Eat a healthy diet    Eat a  diet that includes plenty of vegetables, fruits, low-fat dairy products, and lean protein.  Do not eat a lot of foods that are high in solid fats, added sugars, or sodium.    Get regular exercise  Get regular exercise. This is one of the most important things you can do for your health. Most adults should:  Try to exercise for at least 150 minutes each week. The exercise should increase your heart rate and make you sweat (moderate-intensity exercise).  Try to do strengthening exercises at least twice each week. Do these in addition to the moderate-intensity exercise.  Spend less time sitting. Even light physical activity can be beneficial.    Other tips  Work with your health care provider to achieve or maintain a healthy weight.  Do not use any products that contain nicotine or tobacco. These products include cigarettes, chewing tobacco, and vaping devices, such as e-cigarettes. If you need help quitting, ask your health care provider.  Know your numbers. Ask your health care provider to check your cholesterol and your blood sugar (glucose). Continue to have your blood tested as directed by your health care provider.    Do I need screening for cancer?  Depending on your health history and family history, you may need to have cancer screenings at different stages of your life. This may include screening for:  Breast cancer.  Cervical cancer.  Lung cancer.  Colorectal cancer.    What is my risk for osteoporosis?  After menopause, you may be at increased risk for osteoporosis. Osteoporosis is a condition in which bone destruction happens more quickly than new bone creation. To help prevent osteoporosis or the bone fractures that can happen because of osteoporosis, you may take the following actions:  If you are 19-50 years old, get at least 1,000 mg of calcium and at least 600 international units (IU) of vitamin D per day.  If you are older than age 50 but younger than age 70, get at least 1,200 mg of calcium and at  least 600 international units (IU) of vitamin D per day.  If you are older than age 70, get at least 1,200 mg of calcium and at least 800 international units (IU) of vitamin D per day.    Smoking and drinking excessive alcohol increase the risk of osteoporosis. Eat foods that are rich in calcium and vitamin D, and do weight-bearing exercises several times each week as directed by your health care provider.    How does menopause affect my mental health?  Depression may occur at any age, but it is more common as you become older. Common symptoms of depression include:  Feeling depressed.  Changes in sleep patterns.  Changes in appetite or eating patterns.  Feeling an overall lack of motivation or enjoyment of activities that you previously enjoyed.  Frequent crying spells.    Talk with your health care provider if you think that you are experiencing any of these symptoms.    General instructions  See your health care provider for regular wellness exams and vaccines. This may include:  Scheduling regular health, dental, and eye exams.  Getting and maintaining your vaccines. These include:  Influenza vaccine. Get this vaccine each year before the flu season begins.  Pneumonia vaccine (Prevnar 20)  Shingles vaccine (Shingrix, ages 50 and older)  Tetanus, diphtheria, and pertussis (Tdap) booster vaccine (every 10 years)  Respiratory Syncytial Virus (RSV) vaccine (ages 60 and older)    Your health care provider may also recommend other immunizations.    Tell your health care provider if you have ever been abused or do not feel safe at home.    Summary  Menopause is a normal process in which your ability to get pregnant comes to an end.  This condition causes hot flashes, night sweats, decreased interest in sex, mood swings, headaches, or lack of sleep.  Treatment for this condition may include hormone replacement therapy.  Take actions to keep yourself healthy, including exercising regularly, eating a healthy diet,  watching your weight, and checking your blood pressure and blood sugar levels.  Get screened for cancer and depression. Make sure that you are up to date with all your vaccines.    This information is not intended to replace advice given to you by your health care provider. Make sure you discuss any questions you have with your health care provider.  Document Revised: 05/09/2022 Document Reviewed: 05/09/2022  [employer] Patient Education © 2023 [employer]  Updated 2/29/24 TC

## 2024-08-13 LAB
25(OH)D3+25(OH)D2 SERPL-MCNC: 73.2 NG/ML (ref 30–100)
ALBUMIN SERPL-MCNC: 4.5 G/DL (ref 3.5–5.2)
ALBUMIN/GLOB SERPL: 1.8 G/DL
ALP SERPL-CCNC: 102 U/L (ref 39–117)
ALT SERPL-CCNC: 16 U/L (ref 1–33)
AST SERPL-CCNC: 19 U/L (ref 1–32)
BILIRUB SERPL-MCNC: 0.2 MG/DL (ref 0–1.2)
BUN SERPL-MCNC: 18 MG/DL (ref 6–20)
BUN/CREAT SERPL: 21.7 (ref 7–25)
CALCIUM SERPL-MCNC: 9.6 MG/DL (ref 8.6–10.5)
CHLORIDE SERPL-SCNC: 102 MMOL/L (ref 98–107)
CHOLEST SERPL-MCNC: 212 MG/DL (ref 0–200)
CO2 SERPL-SCNC: 28.5 MMOL/L (ref 22–29)
CREAT SERPL-MCNC: 0.83 MG/DL (ref 0.57–1)
EGFRCR SERPLBLD CKD-EPI 2021: 83.4 ML/MIN/1.73
ERYTHROCYTE [DISTWIDTH] IN BLOOD BY AUTOMATED COUNT: 13 % (ref 12.3–15.4)
FOLATE SERPL-MCNC: 3.27 NG/ML (ref 4.78–24.2)
GLOBULIN SER CALC-MCNC: 2.5 GM/DL
GLUCOSE SERPL-MCNC: 94 MG/DL (ref 65–99)
HBA1C MFR BLD: 5.8 % (ref 4.8–5.6)
HCT VFR BLD AUTO: 41.3 % (ref 34–46.6)
HDLC SERPL-MCNC: 85 MG/DL (ref 40–60)
HGB BLD-MCNC: 13.5 G/DL (ref 12–15.9)
IRON SATN MFR SERPL: 16 % (ref 20–50)
IRON SERPL-MCNC: 59 MCG/DL (ref 37–145)
LDLC SERPL CALC-MCNC: 112 MG/DL (ref 0–100)
MCH RBC QN AUTO: 28.2 PG (ref 26.6–33)
MCHC RBC AUTO-ENTMCNC: 32.7 G/DL (ref 31.5–35.7)
MCV RBC AUTO: 86.4 FL (ref 79–97)
PLATELET # BLD AUTO: 198 10*3/MM3 (ref 140–450)
POTASSIUM SERPL-SCNC: 4.4 MMOL/L (ref 3.5–5.2)
PROT SERPL-MCNC: 7 G/DL (ref 6–8.5)
PYRIDOXAL PHOS SERPL-MCNC: NORMAL UG/L
RBC # BLD AUTO: 4.78 10*6/MM3 (ref 3.77–5.28)
SODIUM SERPL-SCNC: 140 MMOL/L (ref 136–145)
TIBC SERPL-MCNC: 377 MCG/DL
TRIGL SERPL-MCNC: 85 MG/DL (ref 0–150)
TSH SERPL DL<=0.005 MIU/L-ACNC: 2.39 UIU/ML (ref 0.27–4.2)
UIBC SERPL-MCNC: 318 MCG/DL (ref 112–346)
VIT B12 SERPL-MCNC: 497 PG/ML (ref 211–946)
VLDLC SERPL CALC-MCNC: 15 MG/DL (ref 5–40)
WBC # BLD AUTO: 8.97 10*3/MM3 (ref 3.4–10.8)

## 2024-08-14 ENCOUNTER — TELEPHONE (OUTPATIENT)
Dept: CARDIOLOGY | Facility: CLINIC | Age: 54
End: 2024-08-14
Payer: MEDICARE

## 2024-08-14 NOTE — TELEPHONE ENCOUNTER
Received a cardiac clearance request and a request to have the patient hold xarelto 2 days prior to surgery with Dr. Eduar Cornell on 9/10/2024 for a laparoscopic toupet fundolication with mesh, EGD. Placed on your desk for signature.

## 2024-08-22 LAB
25(OH)D3+25(OH)D2 SERPL-MCNC: 73.2 NG/ML (ref 30–100)
ALBUMIN SERPL-MCNC: 4.5 G/DL (ref 3.5–5.2)
ALBUMIN/GLOB SERPL: 1.8 G/DL
ALP SERPL-CCNC: 102 U/L (ref 39–117)
ALT SERPL-CCNC: 16 U/L (ref 1–33)
AST SERPL-CCNC: 19 U/L (ref 1–32)
BILIRUB SERPL-MCNC: 0.2 MG/DL (ref 0–1.2)
BUN SERPL-MCNC: 18 MG/DL (ref 6–20)
BUN/CREAT SERPL: 21.7 (ref 7–25)
CALCIUM SERPL-MCNC: 9.6 MG/DL (ref 8.6–10.5)
CHLORIDE SERPL-SCNC: 102 MMOL/L (ref 98–107)
CHOLEST SERPL-MCNC: 212 MG/DL (ref 0–200)
CO2 SERPL-SCNC: 28.5 MMOL/L (ref 22–29)
CREAT SERPL-MCNC: 0.83 MG/DL (ref 0.57–1)
EGFRCR SERPLBLD CKD-EPI 2021: 83.4 ML/MIN/1.73
ERYTHROCYTE [DISTWIDTH] IN BLOOD BY AUTOMATED COUNT: 13 % (ref 12.3–15.4)
FOLATE SERPL-MCNC: 3.27 NG/ML (ref 4.78–24.2)
GLOBULIN SER CALC-MCNC: 2.5 GM/DL
GLUCOSE SERPL-MCNC: 94 MG/DL (ref 65–99)
HBA1C MFR BLD: 5.8 % (ref 4.8–5.6)
HCT VFR BLD AUTO: 41.3 % (ref 34–46.6)
HDLC SERPL-MCNC: 85 MG/DL (ref 40–60)
HGB BLD-MCNC: 13.5 G/DL (ref 12–15.9)
IRON SATN MFR SERPL: 16 % (ref 20–50)
IRON SERPL-MCNC: 59 MCG/DL (ref 37–145)
LDLC SERPL CALC-MCNC: 112 MG/DL (ref 0–100)
MCH RBC QN AUTO: 28.2 PG (ref 26.6–33)
MCHC RBC AUTO-ENTMCNC: 32.7 G/DL (ref 31.5–35.7)
MCV RBC AUTO: 86.4 FL (ref 79–97)
PLATELET # BLD AUTO: 198 10*3/MM3 (ref 140–450)
POTASSIUM SERPL-SCNC: 4.4 MMOL/L (ref 3.5–5.2)
PROT SERPL-MCNC: 7 G/DL (ref 6–8.5)
PYRIDOXAL PHOS SERPL-MCNC: 58.9 UG/L (ref 3.4–65.2)
RBC # BLD AUTO: 4.78 10*6/MM3 (ref 3.77–5.28)
SODIUM SERPL-SCNC: 140 MMOL/L (ref 136–145)
TIBC SERPL-MCNC: 377 MCG/DL
TRIGL SERPL-MCNC: 85 MG/DL (ref 0–150)
TSH SERPL DL<=0.005 MIU/L-ACNC: 2.39 UIU/ML (ref 0.27–4.2)
UIBC SERPL-MCNC: 318 MCG/DL (ref 112–346)
VIT B12 SERPL-MCNC: 497 PG/ML (ref 211–946)
VLDLC SERPL CALC-MCNC: 15 MG/DL (ref 5–40)
WBC # BLD AUTO: 8.97 10*3/MM3 (ref 3.4–10.8)

## 2024-08-27 DIAGNOSIS — K21.9 GASTROESOPHAGEAL REFLUX DISEASE, UNSPECIFIED WHETHER ESOPHAGITIS PRESENT: ICD-10-CM

## 2024-08-27 RX ORDER — DEXLANSOPRAZOLE 60 MG/1
60 CAPSULE, DELAYED RELEASE ORAL DAILY
Qty: 90 CAPSULE | Refills: 3 | Status: SHIPPED | OUTPATIENT
Start: 2024-08-27

## 2024-09-04 ENCOUNTER — PRE-ADMISSION TESTING (OUTPATIENT)
Dept: PREADMISSION TESTING | Facility: HOSPITAL | Age: 54
End: 2024-09-04
Payer: MEDICARE

## 2024-09-04 VITALS — WEIGHT: 136.02 LBS | BODY MASS INDEX: 24.1 KG/M2 | HEIGHT: 63 IN

## 2024-09-04 LAB
ANION GAP SERPL CALCULATED.3IONS-SCNC: 8 MMOL/L (ref 5–15)
BUN SERPL-MCNC: 14 MG/DL (ref 6–20)
BUN/CREAT SERPL: 15.1 (ref 7–25)
CALCIUM SPEC-SCNC: 9.6 MG/DL (ref 8.6–10.5)
CHLORIDE SERPL-SCNC: 100 MMOL/L (ref 98–107)
CO2 SERPL-SCNC: 30 MMOL/L (ref 22–29)
CREAT SERPL-MCNC: 0.93 MG/DL (ref 0.57–1)
DEPRECATED RDW RBC AUTO: 38.8 FL (ref 37–54)
EGFRCR SERPLBLD CKD-EPI 2021: 72.7 ML/MIN/1.73
ERYTHROCYTE [DISTWIDTH] IN BLOOD BY AUTOMATED COUNT: 12.5 % (ref 12.3–15.4)
GLUCOSE SERPL-MCNC: 121 MG/DL (ref 65–99)
HCT VFR BLD AUTO: 43.9 % (ref 34–46.6)
HGB BLD-MCNC: 14.3 G/DL (ref 12–15.9)
INR PPP: 0.96 (ref 0.89–1.12)
MCH RBC QN AUTO: 27.7 PG (ref 26.6–33)
MCHC RBC AUTO-ENTMCNC: 32.6 G/DL (ref 31.5–35.7)
MCV RBC AUTO: 85.1 FL (ref 79–97)
PLATELET # BLD AUTO: 230 10*3/MM3 (ref 140–450)
PMV BLD AUTO: 10.9 FL (ref 6–12)
POTASSIUM SERPL-SCNC: 3.9 MMOL/L (ref 3.5–5.2)
PROTHROMBIN TIME: 12.9 SECONDS (ref 12.2–14.5)
RBC # BLD AUTO: 5.16 10*6/MM3 (ref 3.77–5.28)
SODIUM SERPL-SCNC: 138 MMOL/L (ref 136–145)
WBC NRBC COR # BLD AUTO: 5.77 10*3/MM3 (ref 3.4–10.8)

## 2024-09-04 PROCEDURE — 85027 COMPLETE CBC AUTOMATED: CPT

## 2024-09-04 PROCEDURE — 85610 PROTHROMBIN TIME: CPT

## 2024-09-04 PROCEDURE — 80048 BASIC METABOLIC PNL TOTAL CA: CPT

## 2024-09-04 PROCEDURE — 36415 COLL VENOUS BLD VENIPUNCTURE: CPT

## 2024-09-04 NOTE — PAT
An arrival time for procedure was not provided during PAT visit. If patient had any questions or concerns about their arrival time, they were instructed to contact their surgeon/physician.  Additionally, if the patient referred to an arrival time that was acquired from their my chart account, patient was encouraged to verify that time with their surgeon/physician. Arrival times are NOT provided in Pre Admission Testing Department.    Patient viewed general PAT education video as instructed in their preoperative information received from their surgeon.  Patient stated the general PAT education video was viewed in its entirety and survey completed.  Copies of PAT general education handouts (Incentive Spirometry, Meds to Beds Program, Patient Belongings, Pre-op skin preparation instructions, Blood Glucose testing, Visitor policy, Surgery FAQ, Code H) distributed to patient if not printed. Education related to the PAT pass and skin preparation for surgery (if applicable) completed in PAT as a reinforcement to PAT education video. Patient instructed to return PAT pass provided today as well as completed skin preparation sheet (if applicable) on the day of procedure.     Additionally if patient had not viewed video yet but intended to view it at home or in our waiting area, then referred them to the handout with QR code/link provided during PAT visit.  Encouraged patient/family to read PAT general education handouts thoroughly and notify PAT staff with any questions or concerns. Patient verbalized understanding of all information and priority content.    Patient denies any current skin issues.     Patient to apply Chlorhexadine wipes  to surgical area (as instructed) the night before procedure and the AM of procedure. Wipes provided.    Post-Surgery Information Instruction Sheet given to patient during Pre-Admission Testing Visit with verbal instructions to patient to return with PAT PASS on the day of surgery.  Additionally, encouraged patient to review the information provided.    Per Anesthesia Request, patient instructed not to take their ACE/ARB medications on the AM of surgery.    Verified patient previously completed cardiology visit for cardiac risk assessment in preparation for upcoming procedure, completion of 12-lead ECG within six months, and risk assessment letter reviewed. No further interventions required.   Cardiac risk assessment from Dr. Raphael on 6/13/24 on chart.     EKG from 4/9/24 on chart.     Device check from 8/6/24 on chart.     A1c from 8/12/24 in Epic.

## 2024-09-10 ENCOUNTER — ANESTHESIA (OUTPATIENT)
Dept: PERIOP | Facility: HOSPITAL | Age: 54
End: 2024-09-10
Payer: MEDICARE

## 2024-09-10 ENCOUNTER — HOSPITAL ENCOUNTER (OUTPATIENT)
Facility: HOSPITAL | Age: 54
Discharge: HOME | End: 2024-09-12
Attending: SURGERY | Admitting: SURGERY
Payer: MEDICARE

## 2024-09-10 ENCOUNTER — ANESTHESIA EVENT (OUTPATIENT)
Dept: PERIOP | Facility: HOSPITAL | Age: 54
End: 2024-09-10
Payer: MEDICARE

## 2024-09-10 DIAGNOSIS — K44.9 HIATAL HERNIA WITH GERD: Primary | ICD-10-CM

## 2024-09-10 DIAGNOSIS — K21.9 HIATAL HERNIA WITH GERD: Primary | ICD-10-CM

## 2024-09-10 PROCEDURE — 25010000002 ONDANSETRON PER 1 MG: Performed by: NURSE ANESTHETIST, CERTIFIED REGISTERED

## 2024-09-10 PROCEDURE — 25810000003 LACTATED RINGERS PER 1000 ML: Performed by: ANESTHESIOLOGY

## 2024-09-10 PROCEDURE — 25010000002 SUGAMMADEX 200 MG/2ML SOLUTION: Performed by: NURSE ANESTHETIST, CERTIFIED REGISTERED

## 2024-09-10 PROCEDURE — 25010000002 FENTANYL CITRATE (PF) 100 MCG/2ML SOLUTION: Performed by: NURSE ANESTHETIST, CERTIFIED REGISTERED

## 2024-09-10 PROCEDURE — C1781 MESH (IMPLANTABLE): HCPCS | Performed by: SURGERY

## 2024-09-10 PROCEDURE — 25010000002 FENTANYL CITRATE (PF) 50 MCG/ML SOLUTION

## 2024-09-10 PROCEDURE — 25010000002 DEXAMETHASONE PER 1 MG: Performed by: NURSE ANESTHETIST, CERTIFIED REGISTERED

## 2024-09-10 PROCEDURE — 25010000002 PROPOFOL 10 MG/ML EMULSION: Performed by: NURSE ANESTHETIST, CERTIFIED REGISTERED

## 2024-09-10 PROCEDURE — 25010000002 CEFAZOLIN PER 500 MG: Performed by: SURGERY

## 2024-09-10 PROCEDURE — 25010000002 KETOROLAC TROMETHAMINE PER 15 MG: Performed by: NURSE ANESTHETIST, CERTIFIED REGISTERED

## 2024-09-10 PROCEDURE — 25010000002 ONDANSETRON PER 1 MG: Performed by: SURGERY

## 2024-09-10 PROCEDURE — 25010000002 HYDROMORPHONE 1 MG/ML SOLUTION

## 2024-09-10 PROCEDURE — 25810000003 LACTATED RINGERS PER 1000 ML: Performed by: SURGERY

## 2024-09-10 DEVICE — PHASIX ST MESH, RECTANGLE
Type: IMPLANTABLE DEVICE | Site: ABDOMEN | Status: FUNCTIONAL
Brand: PHASIX ST MESH

## 2024-09-10 DEVICE — LIGACLIP 10-M/L, 10MM ENDOSCOPIC ROTATING MULTIPLE CLIP APPLIERS
Type: IMPLANTABLE DEVICE | Site: ABDOMEN | Status: FUNCTIONAL
Brand: LIGACLIP

## 2024-09-10 RX ORDER — PROMETHAZINE HYDROCHLORIDE 6.25 MG/5ML
12.5 SYRUP ORAL EVERY 6 HOURS PRN
Status: DISCONTINUED | OUTPATIENT
Start: 2024-09-10 | End: 2024-09-12 | Stop reason: HOSPADM

## 2024-09-10 RX ORDER — LIDOCAINE HYDROCHLORIDE 10 MG/ML
0.5 INJECTION, SOLUTION EPIDURAL; INFILTRATION; INTRACAUDAL; PERINEURAL ONCE AS NEEDED
Status: COMPLETED | OUTPATIENT
Start: 2024-09-10 | End: 2024-09-10

## 2024-09-10 RX ORDER — FENTANYL CITRATE 50 UG/ML
INJECTION, SOLUTION INTRAMUSCULAR; INTRAVENOUS
Status: COMPLETED
Start: 2024-09-10 | End: 2024-09-10

## 2024-09-10 RX ORDER — BUPIVACAINE HCL/0.9 % NACL/PF 0.125 %
PLASTIC BAG, INJECTION (ML) EPIDURAL AS NEEDED
Status: DISCONTINUED | OUTPATIENT
Start: 2024-09-10 | End: 2024-09-10 | Stop reason: SURG

## 2024-09-10 RX ORDER — FAMOTIDINE 10 MG/ML
20 INJECTION, SOLUTION INTRAVENOUS ONCE
Status: CANCELLED | OUTPATIENT
Start: 2024-09-10 | End: 2024-09-10

## 2024-09-10 RX ORDER — SODIUM CHLORIDE, SODIUM LACTATE, POTASSIUM CHLORIDE, CALCIUM CHLORIDE 600; 310; 30; 20 MG/100ML; MG/100ML; MG/100ML; MG/100ML
50 INJECTION, SOLUTION INTRAVENOUS CONTINUOUS
Status: DISCONTINUED | OUTPATIENT
Start: 2024-09-10 | End: 2024-09-12 | Stop reason: HOSPADM

## 2024-09-10 RX ORDER — PANTOPRAZOLE SODIUM 40 MG/1
40 TABLET, DELAYED RELEASE ORAL
Status: DISCONTINUED | OUTPATIENT
Start: 2024-09-11 | End: 2024-09-12 | Stop reason: HOSPADM

## 2024-09-10 RX ORDER — MIDAZOLAM HYDROCHLORIDE 1 MG/ML
1 INJECTION INTRAMUSCULAR; INTRAVENOUS
Status: DISCONTINUED | OUTPATIENT
Start: 2024-09-10 | End: 2024-09-10 | Stop reason: HOSPADM

## 2024-09-10 RX ORDER — ONDANSETRON 2 MG/ML
INJECTION INTRAMUSCULAR; INTRAVENOUS AS NEEDED
Status: DISCONTINUED | OUTPATIENT
Start: 2024-09-10 | End: 2024-09-10 | Stop reason: SURG

## 2024-09-10 RX ORDER — KETOROLAC TROMETHAMINE 30 MG/ML
INJECTION, SOLUTION INTRAMUSCULAR; INTRAVENOUS AS NEEDED
Status: DISCONTINUED | OUTPATIENT
Start: 2024-09-10 | End: 2024-09-10 | Stop reason: SURG

## 2024-09-10 RX ORDER — FAMOTIDINE 20 MG/1
20 TABLET, FILM COATED ORAL ONCE
Status: COMPLETED | OUTPATIENT
Start: 2024-09-10 | End: 2024-09-10

## 2024-09-10 RX ORDER — DOCUSATE SODIUM 50 MG/5 ML
100 LIQUID (ML) ORAL DAILY
Status: DISCONTINUED | OUTPATIENT
Start: 2024-09-10 | End: 2024-09-12 | Stop reason: HOSPADM

## 2024-09-10 RX ORDER — HEPARIN SODIUM 5000 [USP'U]/ML
5000 INJECTION, SOLUTION INTRAVENOUS; SUBCUTANEOUS EVERY 8 HOURS SCHEDULED
Status: DISCONTINUED | OUTPATIENT
Start: 2024-09-11 | End: 2024-09-12 | Stop reason: HOSPADM

## 2024-09-10 RX ORDER — SODIUM CHLORIDE 0.9 % (FLUSH) 0.9 %
10 SYRINGE (ML) INJECTION EVERY 12 HOURS SCHEDULED
Status: DISCONTINUED | OUTPATIENT
Start: 2024-09-10 | End: 2024-09-10 | Stop reason: HOSPADM

## 2024-09-10 RX ORDER — FENTANYL CITRATE 50 UG/ML
50 INJECTION, SOLUTION INTRAMUSCULAR; INTRAVENOUS
Status: DISCONTINUED | OUTPATIENT
Start: 2024-09-10 | End: 2024-09-10 | Stop reason: HOSPADM

## 2024-09-10 RX ORDER — HYDROMORPHONE HYDROCHLORIDE 1 MG/ML
0.5 INJECTION, SOLUTION INTRAMUSCULAR; INTRAVENOUS; SUBCUTANEOUS
Status: DISCONTINUED | OUTPATIENT
Start: 2024-09-10 | End: 2024-09-10 | Stop reason: HOSPADM

## 2024-09-10 RX ORDER — SODIUM CHLORIDE 0.9 % (FLUSH) 0.9 %
10 SYRINGE (ML) INJECTION AS NEEDED
Status: DISCONTINUED | OUTPATIENT
Start: 2024-09-10 | End: 2024-09-10 | Stop reason: HOSPADM

## 2024-09-10 RX ORDER — DEXAMETHASONE SODIUM PHOSPHATE 4 MG/ML
INJECTION, SOLUTION INTRA-ARTICULAR; INTRALESIONAL; INTRAMUSCULAR; INTRAVENOUS; SOFT TISSUE AS NEEDED
Status: DISCONTINUED | OUTPATIENT
Start: 2024-09-10 | End: 2024-09-10 | Stop reason: SURG

## 2024-09-10 RX ORDER — SIMETHICONE 80 MG
TABLET,CHEWABLE ORAL
Status: COMPLETED
Start: 2024-09-10 | End: 2024-09-10

## 2024-09-10 RX ORDER — CHLORTHALIDONE 25 MG/1
12.5 TABLET ORAL DAILY
Status: DISCONTINUED | OUTPATIENT
Start: 2024-09-11 | End: 2024-09-12 | Stop reason: HOSPADM

## 2024-09-10 RX ORDER — FLECAINIDE ACETATE 50 MG/1
50 TABLET ORAL 2 TIMES DAILY
Status: DISCONTINUED | OUTPATIENT
Start: 2024-09-10 | End: 2024-09-12 | Stop reason: HOSPADM

## 2024-09-10 RX ORDER — LOSARTAN POTASSIUM 50 MG/1
50 TABLET ORAL
Status: DISCONTINUED | OUTPATIENT
Start: 2024-09-10 | End: 2024-09-12 | Stop reason: HOSPADM

## 2024-09-10 RX ORDER — LIDOCAINE HYDROCHLORIDE 10 MG/ML
INJECTION, SOLUTION EPIDURAL; INFILTRATION; INTRACAUDAL; PERINEURAL AS NEEDED
Status: DISCONTINUED | OUTPATIENT
Start: 2024-09-10 | End: 2024-09-10 | Stop reason: SURG

## 2024-09-10 RX ORDER — HYDROMORPHONE HCL/0.9% NACL/PF 10 MG/50ML
PATIENT CONTROLLED ANALGESIA SYRINGE INTRAVENOUS CONTINUOUS
Status: DISCONTINUED | OUTPATIENT
Start: 2024-09-10 | End: 2024-09-11

## 2024-09-10 RX ORDER — ONDANSETRON 2 MG/ML
4 INJECTION INTRAMUSCULAR; INTRAVENOUS EVERY 6 HOURS PRN
Status: DISCONTINUED | OUTPATIENT
Start: 2024-09-10 | End: 2024-09-12 | Stop reason: HOSPADM

## 2024-09-10 RX ORDER — HYDROMORPHONE HCL/0.9% NACL/PF 10 MG/50ML
PATIENT CONTROLLED ANALGESIA SYRINGE INTRAVENOUS
Status: COMPLETED
Start: 2024-09-10 | End: 2024-09-10

## 2024-09-10 RX ORDER — SODIUM CHLORIDE, SODIUM LACTATE, POTASSIUM CHLORIDE, CALCIUM CHLORIDE 600; 310; 30; 20 MG/100ML; MG/100ML; MG/100ML; MG/100ML
9 INJECTION, SOLUTION INTRAVENOUS CONTINUOUS
Status: DISCONTINUED | OUTPATIENT
Start: 2024-09-10 | End: 2024-09-10

## 2024-09-10 RX ORDER — SODIUM CHLORIDE 9 MG/ML
40 INJECTION, SOLUTION INTRAVENOUS AS NEEDED
Status: DISCONTINUED | OUTPATIENT
Start: 2024-09-10 | End: 2024-09-10 | Stop reason: HOSPADM

## 2024-09-10 RX ORDER — FENTANYL CITRATE 50 UG/ML
INJECTION, SOLUTION INTRAMUSCULAR; INTRAVENOUS AS NEEDED
Status: DISCONTINUED | OUTPATIENT
Start: 2024-09-10 | End: 2024-09-10 | Stop reason: SDUPTHER

## 2024-09-10 RX ORDER — PANTOPRAZOLE SODIUM 40 MG/1
40 TABLET, DELAYED RELEASE ORAL
Status: DISCONTINUED | OUTPATIENT
Start: 2024-09-11 | End: 2024-09-10

## 2024-09-10 RX ORDER — SIMETHICONE 80 MG
80 TABLET,CHEWABLE ORAL 4 TIMES DAILY PRN
Status: DISCONTINUED | OUTPATIENT
Start: 2024-09-10 | End: 2024-09-12 | Stop reason: HOSPADM

## 2024-09-10 RX ORDER — DIPHENHYDRAMINE HYDROCHLORIDE 50 MG/ML
25 INJECTION INTRAMUSCULAR; INTRAVENOUS EVERY 6 HOURS PRN
Status: DISCONTINUED | OUTPATIENT
Start: 2024-09-10 | End: 2024-09-12 | Stop reason: HOSPADM

## 2024-09-10 RX ORDER — BUPIVACAINE HYDROCHLORIDE AND EPINEPHRINE 2.5; 5 MG/ML; UG/ML
INJECTION, SOLUTION EPIDURAL; INFILTRATION; INTRACAUDAL; PERINEURAL AS NEEDED
Status: DISCONTINUED | OUTPATIENT
Start: 2024-09-10 | End: 2024-09-10 | Stop reason: HOSPADM

## 2024-09-10 RX ORDER — ONDANSETRON 4 MG/1
4 TABLET, ORALLY DISINTEGRATING ORAL EVERY 6 HOURS PRN
Status: DISCONTINUED | OUTPATIENT
Start: 2024-09-10 | End: 2024-09-12 | Stop reason: HOSPADM

## 2024-09-10 RX ORDER — DROPERIDOL 2.5 MG/ML
0.62 INJECTION, SOLUTION INTRAMUSCULAR; INTRAVENOUS ONCE AS NEEDED
Status: DISCONTINUED | OUTPATIENT
Start: 2024-09-10 | End: 2024-09-10 | Stop reason: HOSPADM

## 2024-09-10 RX ORDER — ROCURONIUM BROMIDE 10 MG/ML
INJECTION, SOLUTION INTRAVENOUS AS NEEDED
Status: DISCONTINUED | OUTPATIENT
Start: 2024-09-10 | End: 2024-09-10 | Stop reason: SURG

## 2024-09-10 RX ORDER — NALOXONE HCL 0.4 MG/ML
0.1 VIAL (ML) INJECTION
Status: DISCONTINUED | OUTPATIENT
Start: 2024-09-10 | End: 2024-09-12 | Stop reason: HOSPADM

## 2024-09-10 RX ORDER — PROPOFOL 10 MG/ML
VIAL (ML) INTRAVENOUS AS NEEDED
Status: DISCONTINUED | OUTPATIENT
Start: 2024-09-10 | End: 2024-09-10 | Stop reason: SURG

## 2024-09-10 RX ADMIN — HYDROMORPHONE HYDROCHLORIDE 0.5 MG: 1 INJECTION, SOLUTION INTRAMUSCULAR; INTRAVENOUS; SUBCUTANEOUS at 14:09

## 2024-09-10 RX ADMIN — PROPOFOL 120 MG: 10 INJECTION, EMULSION INTRAVENOUS at 12:05

## 2024-09-10 RX ADMIN — FENTANYL CITRATE 50 MCG: 50 INJECTION, SOLUTION INTRAMUSCULAR; INTRAVENOUS at 14:30

## 2024-09-10 RX ADMIN — DOCUSATE SODIUM 100 MG: 50 LIQUID ORAL at 21:09

## 2024-09-10 RX ADMIN — Medication 100 MCG: at 12:44

## 2024-09-10 RX ADMIN — FLECAINIDE ACETATE 50 MG: 50 TABLET ORAL at 21:10

## 2024-09-10 RX ADMIN — Medication 100 MCG: at 12:15

## 2024-09-10 RX ADMIN — ONDANSETRON 4 MG: 2 INJECTION INTRAMUSCULAR; INTRAVENOUS at 23:44

## 2024-09-10 RX ADMIN — Medication 100 MCG: at 12:16

## 2024-09-10 RX ADMIN — FENTANYL CITRATE 50 MCG: 50 INJECTION, SOLUTION INTRAMUSCULAR; INTRAVENOUS at 14:00

## 2024-09-10 RX ADMIN — Medication: at 15:00

## 2024-09-10 RX ADMIN — KETOROLAC TROMETHAMINE 15 MG: 30 INJECTION, SOLUTION INTRAMUSCULAR; INTRAVENOUS at 13:27

## 2024-09-10 RX ADMIN — LIDOCAINE HYDROCHLORIDE 0.5 ML: 10 INJECTION, SOLUTION EPIDURAL; INFILTRATION; INTRACAUDAL; PERINEURAL at 10:49

## 2024-09-10 RX ADMIN — SODIUM CHLORIDE, POTASSIUM CHLORIDE, SODIUM LACTATE AND CALCIUM CHLORIDE 9 ML/HR: 600; 310; 30; 20 INJECTION, SOLUTION INTRAVENOUS at 10:49

## 2024-09-10 RX ADMIN — SODIUM CHLORIDE 2000 MG: 900 INJECTION INTRAVENOUS at 12:01

## 2024-09-10 RX ADMIN — LIDOCAINE HYDROCHLORIDE 50 MG: 10 INJECTION, SOLUTION EPIDURAL; INFILTRATION; INTRACAUDAL; PERINEURAL at 12:05

## 2024-09-10 RX ADMIN — SIMETHICONE 80 MG: 80 TABLET, CHEWABLE ORAL at 14:30

## 2024-09-10 RX ADMIN — Medication 50 MCG: at 13:08

## 2024-09-10 RX ADMIN — FENTANYL CITRATE 100 MCG: 50 INJECTION, SOLUTION INTRAMUSCULAR; INTRAVENOUS at 12:05

## 2024-09-10 RX ADMIN — SODIUM CHLORIDE, POTASSIUM CHLORIDE, SODIUM LACTATE AND CALCIUM CHLORIDE 75 ML/HR: 600; 310; 30; 20 INJECTION, SOLUTION INTRAVENOUS at 18:55

## 2024-09-10 RX ADMIN — ONDANSETRON 4 MG: 2 INJECTION INTRAMUSCULAR; INTRAVENOUS at 13:16

## 2024-09-10 RX ADMIN — FAMOTIDINE 20 MG: 20 TABLET ORAL at 10:49

## 2024-09-10 RX ADMIN — ROCURONIUM BROMIDE 50 MG: 10 INJECTION INTRAVENOUS at 12:05

## 2024-09-10 RX ADMIN — Medication 150 MCG: at 12:56

## 2024-09-10 RX ADMIN — SUGAMMADEX 200 MG: 100 INJECTION, SOLUTION INTRAVENOUS at 13:20

## 2024-09-10 RX ADMIN — Medication 80 MG: at 14:30

## 2024-09-10 RX ADMIN — DEXAMETHASONE SODIUM PHOSPHATE 4 MG: 4 INJECTION INTRA-ARTICULAR; INTRALESIONAL; INTRAMUSCULAR; INTRAVENOUS; SOFT TISSUE at 12:10

## 2024-09-10 NOTE — ANESTHESIA PREPROCEDURE EVALUATION
Anesthesia Evaluation     Patient summary reviewed and Nursing notes reviewed                Airway   Mallampati: II  TM distance: >3 FB  Neck ROM: full  No difficulty expected  Dental - normal exam     Pulmonary - negative pulmonary ROS and normal exam   Cardiovascular - normal exam    (+) pacemaker (SSS) pacemaker, hypertension, dysrhythmias Atrial Fib, hyperlipidemia      Neuro/Psych  (+) headaches, numbness  GI/Hepatic/Renal/Endo    (+) GERD    Musculoskeletal     (+) chronic pain, myalgias  Abdominal  - normal exam    Bowel sounds: normal.   Substance History - negative use     OB/GYN negative ob/gyn ROS         Other   arthritis,                   Anesthesia Plan    ASA 3     general     intravenous induction     Anesthetic plan, risks, benefits, and alternatives have been provided, discussed and informed consent has been obtained with: patient.    Plan discussed with CRNA.    CODE STATUS:

## 2024-09-10 NOTE — PROGRESS NOTES
"Patient Name:  Nely Castrejon  YOB: 1970  [id number]    Surgery Post - Operative Note    Date of visit: 9/10/2024    Subjective   Subjective: Complains of shoulder pain.       Objective     Objective:    /86   Pulse 83   Temp 97.6 °F (36.4 °C) (Axillary)   Resp 14   Ht 160 cm (63\")   Wt 61.7 kg (136 lb 0.4 oz)   SpO2 100%   BMI 24.10 kg/m²     CV:  Rate  regular and rhythm  regular  L:  Clear  to auscultation bilaterally   ABD:  Soft, appropriately tender. Dressings  clean, dry and intact   EXT:  No cyanosis, clubbing or edema             Assessment/ Plan: Doing well after Lap Toupet. Continue Pulmonary toilet    Problem List Items Addressed This Visit    None       Active Hospital Problems    Diagnosis  POA    **GERD (gastroesophageal reflux disease) [K21.9]  Yes    Hiatal hernia with GERD [K44.9, K21.9]  Yes    Paroxysmal SVT (supraventricular tachycardia) [I47.10]  Yes    Paroxysmal atrial fibrillation [I48.0]  Yes    Primary hypertension [I10]  Yes    Pain syndrome, chronic [G89.4]  Yes      Resolved Hospital Problems   No resolved problems to display.            Eduar Cornell MD  9/10/2024  16:55 EDT    "

## 2024-09-10 NOTE — INTERVAL H&P NOTE
[facility] Pre-op    Full history and physical note from office is attached.    VS: /95  HR 80  RR 16  T 97.8  Sat 99%RA    Immunizations:  Influenza:  UTD  Pneumococcal:  No  Tetanus:  No    Review of Systems:  Constitutional-- No fever, chills or sweats. No fatigue.  CV-- No chest pain, palpitation or syncope  Resp-- No SOB, cough, hemoptysis  Skin--No rashes or lesions    Physical Exam:  Heart:   Regular rate and rhythm, S1 and S2 normal  Lungs: Clear to auscultation bilaterally, respirations unlabored    LAB Results:  Lab Results   Component Value Date    WBC 5.77 09/04/2024    HGB 14.3 09/04/2024    HCT 43.9 09/04/2024    MCV 85.1 09/04/2024     09/04/2024    NEUTROABS 5.73 03/31/2021    GLUCOSE 121 (H) 09/04/2024    BUN 14 09/04/2024    CREATININE 0.93 09/04/2024    EGFRIFNONA 69 03/31/2021    EGFRIFAFRI 84 03/31/2021     09/04/2024    K 3.9 09/04/2024     09/04/2024    CO2 30.0 (H) 09/04/2024    MG 2.2 01/03/2024    CALCIUM 9.6 09/04/2024    ALBUMIN 4.5 08/12/2024    AST 19 08/12/2024    ALT 16 08/12/2024    BILITOT 0.2 08/12/2024    PTT 29.8 09/28/2021    INR 0.96 09/04/2024       Cancer Staging (if applicable)  Cancer Patient: __ yes __no __unknown__N/A; If yes, clinical stage T:__ N:__M:__, stage group or __N/A      Impression: GERD (gastrointestinal reflux disease)       Plan: LAPAROSCOPIC TOUPET FUNDOPLICATION WITH MESH, EGD       Gela Ramos, MILENA   9/10/2024   10:32 EDT

## 2024-09-10 NOTE — OP NOTE
OPERATIVE NOTE    Patient Name:  Nely Castrejon  YOB: 1970  [id number]    9/10/2024        PREOPERATIVE DIAGNOSIS: GERD with hiatal hernia        POSTOPERATIVE DIAGNOSIS: Same         PROCEDURE PERFORMED:    Laparoscopic Toupet fundoplication with mesh, EGD         SURGEON: Eduar Cornell MD       ASSISTANT:    Assistant: Stan Machuca PA-C      SPECIMENS: None         ANESTHESIA: General.     EBL: Minimal          FINDINGS:   1.  Small sliding hiatal hernia completely reduced and repaired with a posterior hiatal cruroplasty, and reinforced with a piece of Phasix ST  mesh    2. 3 cm Toupet fundoplication performed around 50 [place] bougie             INDICATIONS:    Nely Castrejon is a very pleasant 54 y.o. female with a history of reflux disease and a sliding hiatal hernia. After a complete preoperative workup they were deemed an operative candidate. The risks and benefits were discussed at length with the patient and their family and they agreed to proceed.         DESCRIPTION OF PROCEDURE:      After obtaining informed consent, the patient was taken to the operating room and placed in supine position. After appropriate DVT and antibiotic prophylaxis, general anesthesia was induced. The abdomen was prepped and draped in standard sterile fashion.  After infiltrating the skin with local anesthetic a 12 mm skin incision was made approximately 10 cm from xiphoid process along the left costal margin. An optically guided trocar was then advanced through the abdominal wall into the abdominal cavity without difficulty. The abdomen was insufflated with carbon dioxide gas to a pressure of 15 mmHg. The laparoscope was then advanced through the trocar and the abdominal contents inspected. There was no evidence of bowel bladder or visceral injury with entry of the trocar. At this point after infiltrating the appropriate areas with local anesthetic a standard laparoscopic Nissen fundoplication port placement  schema was followed. A liver retractor was used to retract the liver anteriorly.     Using a combination of electrocautery and ultrasonic dissection the greater curvature of the stomach was mobilized up to the left asiya. The anterior surface of the left asiya was scored with electrocautery, and using blunt dissection, the hernia sac was mobilized from the left pleura. Dissection was carried around the hiatus from the 6 o'clock to the 12 o' clock position. The left pleura was not disturbed .  The pars flaccida was then divided superior and inferior to the hepatic branch of the vagus nerve which was spared throughout the procedure. The anterior surface of the right asiya was then scored and using meticulous blunt dissection a retroesophageal window to the left side was created. A Penrose was placed through this and used to encircle the GE junction. A circumferential mobilization of the GE junction from the hiatus was performed using a combination of electrocautery ultrasonic and blunt dissection. This dissection was carried up into the mediastinum to the level of the aortic arch. The anterior and posterior vagus nerves were identified and spared throughout the procedure. The right and left pleural spaces were identified and spared throughout the procedure. After complete mobilization the hernia sac was taken off the anterior surface of the stomach using ultrasonic dissection and discarded.     A posterior hiatal cruroplasty was then performed using pledgeted 0 silk sutures. This created a snug closure of the hiatus around the esophagus. A piece of Phasix ST  mesh was then placed posteriorly, and tacked to the diaphragm using silk sutures. The anesthetist advanced a 50 Citizen of [place] orogastric bougie under direct visualization into the stomach. Around this bougie at the level of the GE junction a 3cm Toupet fundoplication was then performed using silk sutures. At the completion of the fundoplication, the bougie was removed, as  "was the Penrose drain, which was discarded.     An endoscope was then advanced through the mouth and the esophagus into the stomach under direct visualization. The GE junction was visualized within the fundoplication. Under maximal insufflation a retroflexed view of the GE junction was appreciated. This demonstrated an excellent \"stack of coins\" appearance to the fundoplication with good apposition of the GE junction around the scope. The endoscope was then used to desufflate the stomach and removed from the patient's mouth without difficulty.     The liver retractor was then removed. All trocars were then removed under direct and laparoscopic visualization and the abdomen desufflated. The incisions were then closed using running subcuticular sutures and dressed with dry sterile dressings.      All sponge and needle counts were correct times two at the completion of the procedure. The patient recovered from anesthesia, was extubated in the operating room and was transported to the PACU in stable condition.    COMPLICATIONS: None     Assistant: Stan Machuca PA-C  was responsible for performing the following activities: Retraction, Closing, Placing Dressing, and Held/Positioned Camera and their skilled assistance was necessary for the success of this case.      Eduar Cornell MD  9/10/2024  13:25 EDT    "

## 2024-09-10 NOTE — ANESTHESIA PROCEDURE NOTES
Airway  Urgency: elective    Date/Time: 9/10/2024 12:07 PM  Airway not difficult    General Information and Staff    Patient location during procedure: OR  CRNA/CAA: Ayla Lynch CRNA    Indications and Patient Condition  Indications for airway management: airway protection    Preoxygenated: yes  MILS not maintained throughout  Mask difficulty assessment: 1 - vent by mask    Final Airway Details  Final airway type: endotracheal airway      Successful airway: ETT  Cuffed: yes   Successful intubation technique: direct laryngoscopy  Facilitating devices/methods: intubating stylet  Endotracheal tube insertion site: oral  Blade: Cabrera  Blade size: 2  ETT size (mm): 7.0  Cormack-Lehane Classification: grade I - full view of glottis  Placement verified by: chest auscultation and capnometry   Measured from: lips  ETT/EBT  to lips (cm): 21  Number of attempts at approach: 1  Assessment: lips, teeth, and gum same as pre-op and atraumatic intubation    Additional Comments  Negative epigastric sounds, Breath sound equal bilaterally with symmetric chest rise and fall

## 2024-09-10 NOTE — BRIEF OP NOTE
NISSEN FUNDOPLICATION LAPAROSCOPIC  Progress Note    Nely Castrejon  9/10/2024    Pre-op Diagnosis:   GERD with hiatal hernia       Post-Op Diagnosis Codes:  Same    Procedure/CPT® Codes:        Procedure(s):  LAPAROSCOPIC TOUPET FUNDOPLICATION WITH MESH, EGD              Surgeon(s):  Eduar Cornell MD    Anesthesia: General    Staff:   Circulator: Kaitlin Loco RN; Jacqui Dawson RN  Scrub Person: Katarzyna Lowery RN; Montez Pereyra  Nursing Assistant: June Cadena PCT  Assistant: Stan Machuca PA-C  Assistant: Stan Machuca PA-C      Estimated Blood Loss: minimal    Urine Voided: * No values recorded between 9/10/2024 12:01 PM and 9/10/2024  1:24 PM *    Specimens:                None          Drains: * No LDAs found *    Findings:   Sliding hiatal hernia completely reduced and repaired with a posterior hiatal cruroplasty and reinforced with Phasix ST mesh  Toupet fundoplication performed around [address] [place] [address]        Complications: None    Assistant: Stan Machuca PA-C  was responsible for performing the following activities: Retraction, Closing, Placing Dressing, and Held/Positioned Camera and their skilled assistance was necessary for the success of this case.    Eduar Cornell MD     Date: 9/10/2024  Time: 13:25 EDT

## 2024-09-10 NOTE — ANESTHESIA POSTPROCEDURE EVALUATION
Patient: Nely Castrejon    Procedure Summary       Date: 09/10/24 Room / Location:  [facility]    Anesthesia Start: 1201 Anesthesia Stop: 1352    Procedure: LAPAROSCOPIC TOUPET FUNDOPLICATION WITH MESH, EGD (Abdomen) Diagnosis:     Surgeons: Eduar Cornell MD Provider: Chaim Hernandez MD    Anesthesia Type: general ASA Status: 3            Anesthesia Type: general    Vitals  Vitals Value Taken Time   /89    Temp 97.2    Pulse 87 09/10/24 1351   Resp 16    SpO2 88 % 09/10/24 1351   Vitals shown include unfiled device data.        Post Anesthesia Care and Evaluation    Patient location during evaluation: PACU  Patient participation: complete - patient participated  Level of consciousness: awake and alert  Pain score: 5  Pain management: adequate    Airway patency: patent  Anesthetic complications: No anesthetic complications  PONV Status: none  Cardiovascular status: hemodynamically stable and acceptable  Respiratory status: nonlabored ventilation, acceptable and nasal cannula  Hydration status: acceptable         Problem: Discharge Planning  Goal: Discharge to home or other facility with appropriate resources  Outcome: Progressing     Problem: Skin/Tissue Integrity  Goal: Absence of new skin breakdown  Outcome: Progressing     Problem: Safety - Adult  Goal: Free from fall injury  Outcome: Progressing     Problem: ABCDS Injury Assessment  Goal: Absence of physical injury  Outcome: Progressing     Problem: Pain  Goal: Verbalizes/displays adequate comfort level or baseline comfort level  Outcome: Progressing     Problem: Pain  Goal: Verbalizes/displays adequate comfort level or baseline comfort level  Outcome: Progressing     Problem: Neurosensory - Adult  Goal: Achieves maximal functionality and self care  Outcome: Progressing

## 2024-09-11 ENCOUNTER — APPOINTMENT (OUTPATIENT)
Dept: GENERAL RADIOLOGY | Facility: HOSPITAL | Age: 54
End: 2024-09-11
Payer: MEDICARE

## 2024-09-11 LAB
ANION GAP SERPL CALCULATED.3IONS-SCNC: 11 MMOL/L (ref 5–15)
BASOPHILS # BLD AUTO: 0.02 10*3/MM3 (ref 0–0.2)
BASOPHILS NFR BLD AUTO: 0.2 % (ref 0–1.5)
BUN SERPL-MCNC: 11 MG/DL (ref 6–20)
BUN/CREAT SERPL: 13.3 (ref 7–25)
CALCIUM SPEC-SCNC: 9.3 MG/DL (ref 8.6–10.5)
CHLORIDE SERPL-SCNC: 99 MMOL/L (ref 98–107)
CO2 SERPL-SCNC: 28 MMOL/L (ref 22–29)
CREAT SERPL-MCNC: 0.83 MG/DL (ref 0.57–1)
DEPRECATED RDW RBC AUTO: 39.5 FL (ref 37–54)
EGFRCR SERPLBLD CKD-EPI 2021: 83.4 ML/MIN/1.73
EOSINOPHIL # BLD AUTO: 0 10*3/MM3 (ref 0–0.4)
EOSINOPHIL NFR BLD AUTO: 0 % (ref 0.3–6.2)
ERYTHROCYTE [DISTWIDTH] IN BLOOD BY AUTOMATED COUNT: 12.6 % (ref 12.3–15.4)
GLUCOSE SERPL-MCNC: 106 MG/DL (ref 65–99)
HCT VFR BLD AUTO: 39.7 % (ref 34–46.6)
HGB BLD-MCNC: 12.9 G/DL (ref 12–15.9)
IMM GRANULOCYTES # BLD AUTO: 0.03 10*3/MM3 (ref 0–0.05)
IMM GRANULOCYTES NFR BLD AUTO: 0.2 % (ref 0–0.5)
LYMPHOCYTES # BLD AUTO: 1.65 10*3/MM3 (ref 0.7–3.1)
LYMPHOCYTES NFR BLD AUTO: 13.2 % (ref 19.6–45.3)
MCH RBC QN AUTO: 28.1 PG (ref 26.6–33)
MCHC RBC AUTO-ENTMCNC: 32.5 G/DL (ref 31.5–35.7)
MCV RBC AUTO: 86.5 FL (ref 79–97)
MONOCYTES # BLD AUTO: 0.86 10*3/MM3 (ref 0.1–0.9)
MONOCYTES NFR BLD AUTO: 6.9 % (ref 5–12)
NEUTROPHILS NFR BLD AUTO: 79.5 % (ref 42.7–76)
NEUTROPHILS NFR BLD AUTO: 9.95 10*3/MM3 (ref 1.7–7)
NRBC BLD AUTO-RTO: 0 /100 WBC (ref 0–0.2)
PLATELET # BLD AUTO: 229 10*3/MM3 (ref 140–450)
PMV BLD AUTO: 11.2 FL (ref 6–12)
POTASSIUM SERPL-SCNC: 4.4 MMOL/L (ref 3.5–5.2)
RBC # BLD AUTO: 4.59 10*6/MM3 (ref 3.77–5.28)
SODIUM SERPL-SCNC: 138 MMOL/L (ref 136–145)
WBC NRBC COR # BLD AUTO: 12.51 10*3/MM3 (ref 3.4–10.8)

## 2024-09-11 PROCEDURE — 25010000002 HEPARIN (PORCINE) PER 1000 UNITS: Performed by: SURGERY

## 2024-09-11 PROCEDURE — 97161 PT EVAL LOW COMPLEX 20 MIN: CPT

## 2024-09-11 PROCEDURE — 0 DIATRIZOATE MEGLUMINE & SODIUM PER 1 ML: Performed by: SURGERY

## 2024-09-11 PROCEDURE — 74240 X-RAY XM UPR GI TRC 1CNTRST: CPT

## 2024-09-11 PROCEDURE — 25810000003 LACTATED RINGERS PER 1000 ML: Performed by: SURGERY

## 2024-09-11 PROCEDURE — 85025 COMPLETE CBC W/AUTO DIFF WBC: CPT | Performed by: SURGERY

## 2024-09-11 PROCEDURE — 25010000002 ONDANSETRON PER 1 MG: Performed by: SURGERY

## 2024-09-11 PROCEDURE — 80048 BASIC METABOLIC PNL TOTAL CA: CPT | Performed by: SURGERY

## 2024-09-11 RX ORDER — HYDROMORPHONE HYDROCHLORIDE 1 MG/ML
0.2 INJECTION, SOLUTION INTRAMUSCULAR; INTRAVENOUS; SUBCUTANEOUS
Status: DISCONTINUED | OUTPATIENT
Start: 2024-09-11 | End: 2024-09-12 | Stop reason: HOSPADM

## 2024-09-11 RX ORDER — HYDROCODONE BITARTRATE AND ACETAMINOPHEN 7.5; 325 MG/15ML; MG/15ML
15 SOLUTION ORAL EVERY 4 HOURS PRN
Status: DISCONTINUED | OUTPATIENT
Start: 2024-09-11 | End: 2024-09-12 | Stop reason: HOSPADM

## 2024-09-11 RX ADMIN — ONDANSETRON 4 MG: 2 INJECTION INTRAMUSCULAR; INTRAVENOUS at 08:41

## 2024-09-11 RX ADMIN — HEPARIN SODIUM 5000 UNITS: 5000 INJECTION INTRAVENOUS; SUBCUTANEOUS at 14:21

## 2024-09-11 RX ADMIN — HEPARIN SODIUM 5000 UNITS: 5000 INJECTION INTRAVENOUS; SUBCUTANEOUS at 05:54

## 2024-09-11 RX ADMIN — HYDROCODONE BITARTRATE AND ACETAMINOPHEN 15 ML: 7.5; 325 SOLUTION ORAL at 21:45

## 2024-09-11 RX ADMIN — HYDROCODONE BITARTRATE AND ACETAMINOPHEN 15 ML: 7.5; 325 SOLUTION ORAL at 17:07

## 2024-09-11 RX ADMIN — CHLORTHALIDONE 12.5 MG: 25 TABLET ORAL at 08:29

## 2024-09-11 RX ADMIN — HYDROCODONE BITARTRATE AND ACETAMINOPHEN 15 ML: 7.5; 325 SOLUTION ORAL at 12:09

## 2024-09-11 RX ADMIN — HEPARIN SODIUM 5000 UNITS: 5000 INJECTION INTRAVENOUS; SUBCUTANEOUS at 21:45

## 2024-09-11 RX ADMIN — LOSARTAN POTASSIUM 50 MG: 50 TABLET, FILM COATED ORAL at 08:29

## 2024-09-11 RX ADMIN — SODIUM CHLORIDE, POTASSIUM CHLORIDE, SODIUM LACTATE AND CALCIUM CHLORIDE 75 ML/HR: 600; 310; 30; 20 INJECTION, SOLUTION INTRAVENOUS at 08:31

## 2024-09-11 RX ADMIN — DIATRIZOATE MEGLUMINE AND DIATRIZOATE SODIUM 120 ML: 660; 100 LIQUID ORAL; RECTAL at 10:43

## 2024-09-11 RX ADMIN — FLECAINIDE ACETATE 50 MG: 50 TABLET ORAL at 21:45

## 2024-09-11 RX ADMIN — FLECAINIDE ACETATE 50 MG: 50 TABLET ORAL at 08:28

## 2024-09-11 NOTE — PLAN OF CARE
Problem: Adult Inpatient Plan of Care  Goal: Plan of Care Review  Outcome: Ongoing, Progressing  Goal: Patient-Specific Goal (Individualized)  Outcome: Ongoing, Progressing  Goal: Absence of Hospital-Acquired Illness or Injury  Outcome: Ongoing, Progressing  Intervention: Identify and Manage Fall Risk  Recent Flowsheet Documentation  Taken 9/11/2024 0400 by Keren Souza RN  Safety Promotion/Fall Prevention:   assistive device/personal items within reach   clutter free environment maintained   fall prevention program maintained   nonskid shoes/slippers when out of bed   room organization consistent   safety round/check completed  Taken 9/11/2024 0000 by Keren Souza RN  Safety Promotion/Fall Prevention:   assistive device/personal items within reach   clutter free environment maintained   fall prevention program maintained   nonskid shoes/slippers when out of bed   room organization consistent   safety round/check completed  Taken 9/10/2024 2000 by Keren Souza RN  Safety Promotion/Fall Prevention:   assistive device/personal items within reach   clutter free environment maintained   fall prevention program maintained   nonskid shoes/slippers when out of bed   room organization consistent   safety round/check completed  Intervention: Prevent Skin Injury  Recent Flowsheet Documentation  Taken 9/11/2024 0400 by Keren Souza RN  Body Position: position changed independently  Skin Protection:   tubing/devices free from skin contact   transparent dressing maintained   skin-to-device areas padded  Taken 9/11/2024 0000 by Keren Souza RN  Body Position: position changed independently  Skin Protection:   tubing/devices free from skin contact   transparent dressing maintained   skin-to-device areas padded  Taken 9/10/2024 2000 by Keren Souza RN  Body Position: position changed independently  Skin Protection:   tubing/devices free from skin contact   transparent dressing maintained    skin-to-device areas padded  Intervention: Prevent and Manage VTE (Venous Thromboembolism) Risk  Recent Flowsheet Documentation  Taken 9/11/2024 0400 by Keren Souza RN  Activity Management: activity encouraged  Taken 9/11/2024 0000 by Keren Souza RN  Activity Management: activity encouraged  Taken 9/10/2024 2000 by Keren Souza RN  Activity Management: activity encouraged  Intervention: Prevent Infection  Recent Flowsheet Documentation  Taken 9/11/2024 0400 by Keren Souza RN  Infection Prevention:   environmental surveillance performed   hand hygiene promoted   personal protective equipment utilized   single patient room provided  Taken 9/11/2024 0000 by Keren Souza RN  Infection Prevention:   environmental surveillance performed   hand hygiene promoted   personal protective equipment utilized   single patient room provided  Taken 9/10/2024 2000 by Keren Souza RN  Infection Prevention:   environmental surveillance performed   hand hygiene promoted   personal protective equipment utilized   single patient room provided  Goal: Optimal Comfort and Wellbeing  Outcome: Ongoing, Progressing  Intervention: Provide Person-Centered Care  Recent Flowsheet Documentation  Taken 9/10/2024 2000 by Keren Souza RN  Trust Relationship/Rapport:   care explained   choices provided   questions answered   questions encouraged  Goal: Readiness for Transition of Care  Outcome: Ongoing, Progressing     Problem: Hypertension Comorbidity  Goal: Blood Pressure in Desired Range  Outcome: Ongoing, Progressing  Intervention: Maintain Blood Pressure Management  Recent Flowsheet Documentation  Taken 9/11/2024 0000 by Keren Souza RN  Medication Review/Management: medications reviewed  Taken 9/10/2024 2000 by Keren Souza RN  Medication Review/Management: medications reviewed     Problem: Pain Chronic (Persistent) (Comorbidity Management)  Goal: Acceptable Pain Control and Functional  Ability  Outcome: Ongoing, Progressing  Intervention: Manage Persistent Pain  Recent Flowsheet Documentation  Taken 9/11/2024 0000 by Keren Souza RN  Medication Review/Management: medications reviewed  Taken 9/10/2024 2000 by Keren Souza RN  Medication Review/Management: medications reviewed  Intervention: Optimize Psychosocial Wellbeing  Recent Flowsheet Documentation  Taken 9/11/2024 0400 by Keren Souza, RN  Diversional Activities:   television   smartphone  Family/Support System Care: support provided  Taken 9/11/2024 0000 by Keren Souza RN  Diversional Activities:   television   smartphone  Family/Support System Care: support provided  Taken 9/10/2024 2000 by Keren Souza RN  Diversional Activities:   television   smartphone  Family/Support System Care: support provided   Goal Outcome Evaluation:

## 2024-09-11 NOTE — CONSULTS
Clinical Nutrition     Nutrition Education   Reason for Visit:   Physician Consult       Patient Name: Nely Castrejon  YOB: 1970  MRN: [id number]  Date of Encounter: 09/11/24 16:01 EDT  Admission date: 9/10/2024    Comments:     Applicable Diagnoses       Diet/Nutrition Related History:     Consult received for post fundop diet education. Reviewed food items within clear liquid and full liquid diet, indicating time frames for advancing. Encouraged use of supplements on full liquid diet such as Boost Plus. Discussed food items to avoid due to increased gas and reflux; also reviewed new diet patterns of ways to prevent stretching of stomach. Pt accepted education and was able to verbalize understanding. All questions asked and and answered, kept diet education materials.     Of note, pt w/beverages and straws at bedside. Advised pt to avoid use of straw 2/2 gas.     Labs reviewed   Yes    Nutrition Diagnosis     Problem Food and nutrition knowledge deficit   Related To Post fundop   Signs/Symptoms No prior need for     Goal:     Increase knowledge on diet/nutrition effects on condition/status     Nutrition Intervention     Education provided regarding nutrition therapy for: Diet rationale, Key food habit change, and Post Nissen Fundoplication      Education provided regarding food habits/behavior related to:Food choices, Eating pattern, and Meal planning     RD provided printed material via Diet After Nissen Fundoplication Surgery; Material developed by [facility] and Dr. Eduar Cornell     Monitoring/Evaluation:     Pt acknowledged understanding of material covered      Gayle Chin, MS,RD,LD  Time Spent: 20

## 2024-09-11 NOTE — PLAN OF CARE
Goal Outcome Evaluation:  Plan of Care Reviewed With: patient        Progress: no change  Outcome Evaluation: Pt presents with decreased functional mobility and decreased activity tolerance compared to baseline. Pt ambulated 325ft with SBA, unsupported. Good effort given despite feeling nauseous. Recommend continued skilled IP PT interventions. Recommend D/C home with assist when medically appropriate.      Anticipated Discharge Disposition (PT): home with assist

## 2024-09-11 NOTE — CASE MANAGEMENT/SOCIAL WORK
Discharge Planning Assessment  [facility]     Patient Name: Nely Castrejon  MRN: [id number]  Today's Date: 9/11/2024    Admit Date: 9/10/2024    Plan: home   Discharge Needs Assessment       Row Name 09/11/24 1425       Living Environment    People in Home grandchild(brenda);spouse    Current Living Arrangements home    Potentially Unsafe Housing Conditions none    In the past 12 months has the electric, gas, oil, or water company threatened to shut off services in your home? No    Primary Care Provided by self    Provides Primary Care For grandchild(brenda)    Family Caregiver if Needed spouse    Quality of Family Relationships helpful;involved;supportive    Able to Return to Prior Arrangements yes       Resource/Environmental Concerns    Resource/Environmental Concerns none       Transportation Needs    In the past 12 months, has lack of transportation kept you from medical appointments or from getting medications? no    In the past 12 months, has lack of transportation kept you from meetings, work, or from getting things needed for daily living? No       Food Insecurity    Within the past 12 months, you worried that your food would run out before you got the money to buy more. Never true    Within the past 12 months, the food you bought just didn't last and you didn't have money to get more. Never true       Transition Planning    Patient/Family Anticipates Transition to home with family    Patient/Family Anticipated Services at Transition none    Transportation Anticipated family or friend will provide       Discharge Needs Assessment    Readmission Within the Last 30 Days no previous admission in last 30 days    Equipment Currently Used at Home none    Concerns to be Addressed no discharge needs identified;denies needs/concerns at this time                   Discharge Plan       Row Name 09/11/24 1426       Plan    Plan home    Patient/Family in Agreement with Plan yes    Plan Comments Met with patient at the  bedside to initiate discharge planning. Patient lives with his  and 5 grandchildren in a house in [place]. Patient is independent with ADLs and has no DME. Patient denies any home health or home oxygen. Patient has [place] Medicare and [place] Medicaid insurance with prescription benefits and prefers to fill scripts at the [facility] in [place]. Patient's goal is to return home at discharge.  can transport. CM will continue to follow.    Final Discharge Disposition Code 01 - home or self-care                  Continued Care and Services - Admitted Since 9/10/2024    No active coordination exists for this encounter.          Demographic Summary       Row Name 09/11/24 1424       General Information    Admission Type observation    Arrived From home    Referral Source physician    Reason for Consult discharge planning    Preferred Language English    General Information Comments PCP Jie Mayo       Contact Information    Permission Granted to Share Info With family/designee    Contact Information Comments Nasim Castrejon (Spouse)  [phone] (Mobile)                   Functional Status       Row Name 09/11/24 1424       Functional Status    Usual Activity Tolerance excellent    Current Activity Tolerance good       Functional Status, IADL    Medications independent    Meal Preparation independent    Housekeeping independent    Laundry independent    Shopping independent       Mental Status    General Appearance WDL WDL       Mental Status Summary    Recent Changes in Mental Status/Cognitive Functioning no changes       Employment/    Employment Status disabled                   Psychosocial    No documentation.                  Abuse/Neglect    No documentation.                  Legal    No documentation.                  Substance Abuse    No documentation.                  Patient Forms    No documentation.                     Dyan Saucedo RN

## 2024-09-11 NOTE — THERAPY EVALUATION
"Patient Name: Nely Castrejon  : 1970    MRN: [id number]                              Today's Date: 2024       Admit Date: 9/10/2024    Visit Dx: No diagnosis found.  Patient Active Problem List   Diagnosis    Cervical spondylosis    Lumbar radiculopathy    Lumbar spondylosis    Headache, migraine    Myofascial pain syndrome    Pain syndrome, chronic    Paroxysmal hemicrania    History of sick sinus syndrome    Chronic insomnia    Chronic pain syndrome    Primary hypertension    GERD (gastroesophageal reflux disease)    Pacemaker    Paroxysmal atrial fibrillation    Tachycardia-bradycardia    Dysphagia    Inflammation of sacroiliac joint    Sick sinus syndrome    Long term current use of antiarrhythmic medical therapy    PVC's (premature ventricular contractions)    Paroxysmal SVT (supraventricular tachycardia)    Chronic anticoagulation    Mixed hyperlipidemia    Hiatal hernia with GERD     Past Medical History:   Diagnosis Date    Arthritis     left side    Atrial fibrillation     xarelto    B12 deficiency 2016    Chronic cough     x2 years    Fibromyalgia, primary     GERD (gastroesophageal reflux disease)     History of osteoarthritis 2016    Hypertension     Pacemaker 2013    bradycardia    Sick sinus syndrome     Trochanteric bursitis of left hip 2021    Vitamin B12 deficiency      Past Surgical History:   Procedure Laterality Date    COLON RESECTION      \"twisted colon\"    COLONOSCOPY  2020    [facility] - Dr. Katie Melgar - colon tortuous but otherwise normal. 10 year follow up screening recommended.    ENDOSCOPY      ENDOSCOPY N/A 2023    Procedure: ESOPHAGOGASTRODUODENOSCOPY WITH BIOPSY and DILATION;  Surgeon: Shala Rowe MD;  Location: [facility] ENDOSCOPY;  Service: Gastroenterology;  Laterality: N/A;    ESOPHAGEAL MOTILITY STUDY N/A 2024    Procedure: ESOPHAGEAL MOTILITY STUDY;  Surgeon: Eduar Cornell MD;  Location: [facility] ENDOSCOPY; "  Service: General;  Laterality: N/A;  PROBE WAS INTRODUCED INTO RIGHT NARE AND ADVANCED TO 48CM.  LES NOTED AT 39.7CM PT TOLERATED PROCEDURE WELL,    GASTRIC PH MONITORING 24HR N/A 06/20/2024    Procedure: GASTRIC PH MONITORING 24HR;  Surgeon: Eduar Cornell MD;  Location:  [facility] ENDOSCOPY;  Service: General;  Laterality: N/A;  PH PROBE PLACED @34.5CM    LAPAROSCOPIC CHOLECYSTECTOMY      NISSEN FUNDOPLICATION N/A 9/10/2024    Procedure: LAPAROSCOPIC TOUPET FUNDOPLICATION WITH MESH, EGD;  Surgeon: Eduar Cornell MD;  Location:  [facility] OR;  Service: General;  Laterality: N/A;    PACEMAKER IMPLANTATION Left 2013    PACEMAKER REPLACEMENT N/A 01/03/2024    Procedure: PPM generator change - dual-SJM-hold Xarelto 2 days.;  Surgeon: Andrei Raphael MD;  Location:  [facility] INVASIVE LOCATION;  Service: Cardiology;  Laterality: N/A;  Will need echo day of procedure for hx of MVP. Battery @ [facility] on 12/1/2023. Chronically elevated RV threshold-V paced 1%.    SHOULDER SURGERY Left     bursitis    SUBTOTAL HYSTERECTOMY      Partial       General Information       Row Name 09/11/24 1546          Physical Therapy Time and Intention    Document Type evaluation  -AE     Mode of Treatment physical therapy  -AE       Row Name 09/11/24 2846          General Information    Patient Profile Reviewed yes  -AE     Prior Level of Function independent:;all household mobility;community mobility;gait;transfer;bed mobility;ADL's;dressing;bathing  -AE     Existing Precautions/Restrictions fall;other (see comments)  abdominal incision  -AE     Barriers to Rehab none identified  -AE       Row Name 09/11/24 154          Living Environment    People in Home spouse;grandchild(brenda)  -AE       Row Name 09/11/24 1546          Home Main Entrance    Number of Stairs, Main Entrance one  -AE     Stair Railings, Main Entrance none  -AE       Row Name 09/11/24 1543          Stairs Within Home, Primary    Stairs, Within Home, Primary Flight to downstairs,  doesn't have to use.  -AE     Number of Stairs, Within Home, Primary other (see comments)  -AE       Row Name 09/11/24 1543          Cognition    Orientation Status (Cognition) oriented x 3  -AE       Row Name 09/11/24 1543          Safety Issues, Functional Mobility    Safety Issues Affecting Function (Mobility) awareness of need for assistance;insight into deficits/self-awareness;safety precaution awareness;safety precautions follow-through/compliance;sequencing abilities  -AE     Impairments Affecting Function (Mobility) balance;endurance/activity tolerance;postural/trunk control;strength;pain  -AE               User Key  (r) = Recorded By, (t) = Taken By, (c) = Cosigned By      Initials Name Provider Type    AE Dion Cunningham, PT Physical Therapist                   Mobility       Row Name 09/11/24 1551          Bed Mobility    Bed Mobility supine-sit;sit-supine  -AE     Supine-Sit [place] (Bed Mobility) standby assist  -AE     Sit-Supine [place] (Bed Mobility) standby assist  -AE     Assistive Device (Bed Mobility) head of bed elevated  -AE     Comment, (Bed Mobility) VCs for hand placement and sequencing. Good use of bed rails as needed.  -AE       Row Name 09/11/24 1551          Transfers    Comment, (Transfers) VCs for hand placement and sequencing. No overt LOB noted.  -AE       Row Name 09/11/24 1551          Sit-Stand Transfer    Sit-Stand [place] (Transfers) contact guard;verbal cues  -AE       Row Name 09/11/24 1551          Gait/Stairs (Locomotion)    [place] Level (Gait) standby assist  -AE     Distance in Feet (Gait) 325  -AE     Deviations/Abnormal Patterns (Gait) bilateral deviations;naya decreased;gait speed decreased;stride length decreased  -AE     Bilateral Gait Deviations forward flexed posture  -AE     Comment, (Gait/Stairs) Pt demo step through gait pattern with slowed naya, decreased step length, and forward flexed posture. Pt gait speed became slower with  increased ambulation distance however no LOB noted throughout. Further distance limited by fatigue.  -AE               User Key  (r) = Recorded By, (t) = Taken By, (c) = Cosigned By      Initials Name Provider Type    AE Dion Cunningham PT Physical Therapist                   Obj/Interventions       Row Name 09/11/24 1558          Range of Motion Comprehensive    General Range of Motion bilateral lower extremity ROM WFL  -AE       Row Name 09/11/24 1558          Strength Comprehensive (MMT)    General Manual Muscle Testing (MMT) Assessment lower extremity strength deficits identified  -AE     Comment, General Manual Muscle Testing (MMT) Assessment BLE grossly 4-/5  -AE       Row Name 09/11/24 1558          Balance    Balance Assessment sitting static balance;sitting dynamic balance;sit to stand dynamic balance;standing static balance;standing dynamic balance  -AE     Static Sitting Balance standby assist  -AE     Dynamic Sitting Balance contact guard  -AE     Position, Sitting Balance unsupported;sitting edge of bed  -AE     Sit to Stand Dynamic Balance standby assist  -AE     Static Standing Balance standby assist  -AE     Dynamic Standing Balance standby assist  -AE     Position/Device Used, Standing Balance unsupported  -AE       Row Name 09/11/24 1558          Sensory Assessment (Somatosensory)    Sensory Assessment (Somatosensory) LE sensation intact  -AE               User Key  (r) = Recorded By, (t) = Taken By, (c) = Cosigned By      Initials Name Provider Type    AE Dion Cunningham PT Physical Therapist                   Goals/Plan       Row Name 09/11/24 1604          Bed Mobility Goal 1 (PT)    Activity/Assistive Device (Bed Mobility Goal 1, PT) sit to supine/supine to sit  -AE     [place] Level/Cues Needed (Bed Mobility Goal 1, PT) independent  -AE     Time Frame (Bed Mobility Goal 1, PT) short term goal (STG);5 days  -AE     Progress/Outcomes (Bed Mobility Goal 1, PT) new goal  -AE       Row  Name 09/11/24 1604          Transfer Goal 1 (PT)    Activity/Assistive Device (Transfer Goal 1, PT) sit-to-stand/stand-to-sit;bed-to-chair/chair-to-bed  -AE     [place] Level/Cues Needed (Transfer Goal 1, PT) independent  -AE     Time Frame (Transfer Goal 1, PT) long term goal (LTG);10 days  -AE     Progress/Outcome (Transfer Goal 1, PT) new goal  -AE       Row Name 09/11/24 1604          Gait Training Goal 1 (PT)    Activity/Assistive Device (Gait Training Goal 1, PT) gait (walking locomotion);assistive device use  -AE     [place] Level (Gait Training Goal 1, PT) contact guard required  -AE     Distance (Gait Training Goal 1, PT) 600ft  -AE     Time Frame (Gait Training Goal 1, PT) long term goal (LTG);10 days  -AE     Progress/Outcome (Gait Training Goal 1, PT) new goal  -AE       Row Name 09/11/24 1604          Stairs Goal 1 (PT)    Activity/Assistive Device (Stairs Goal 1, PT) ascending stairs;descending stairs;step-over step;step-to-step  -AE     [place] Level/Cues Needed (Stairs Goal 1, PT) standby assist  -AE     Number of Stairs (Stairs Goal 1, PT) 1  -AE     Time Frame (Stairs Goal 1, PT) long term goal (LTG);10 days  -AE     Progress/Outcome (Stairs Goal 1, PT) new goal  -AE       Row Name 09/11/24 1604          Therapy Assessment/Plan (PT)    Planned Therapy Interventions (PT) balance training;bed mobility training;gait training;home exercise program;patient/family education;postural re-education;ROM (range of motion);stair training;strengthening;transfer training  -AE               User Key  (r) = Recorded By, (t) = Taken By, (c) = Cosigned By      Initials Name Provider Type    AE Dion Cunningham, PT Physical Therapist                   Clinical Impression       Row Name 09/11/24 3026          Pain    Pain Intervention(s) Repositioned;Ambulation/increased activity  -AE     Additional Documentation Pain Scale: FACES Pre/Post-Treatment (Group)  -AE       Row Name 09/11/24 6415           Pain Scale: FACES Pre/Post-Treatment    Pain: FACES Scale, Pretreatment 2-->hurts little bit  -AE     Posttreatment Pain Rating 2-->hurts little bit  -AE     Pain Location incisional  -AE     Pain Location - abdomen  -AE     Pre/Posttreatment Pain Comment tolerated  -AE       Row Name 09/11/24 1559          Plan of Care Review    Plan of Care Reviewed With patient  -AE     Progress no change  -AE     Outcome Evaluation Pt presents with decreased functional mobility and decreased activity tolerance compared to baseline. Pt ambulated 325ft with SBA, unsupported. Good effort given despite feeling nauseous. Recommend continued skilled IP PT interventions. Recommend D/C home with assist when medically appropriate.  -AE       Row Name 09/11/24 1558          Therapy Assessment/Plan (PT)    Patient/Family Therapy Goals Statement (PT) Return home  -AE     Rehab Potential (PT) good, to achieve stated therapy goals  -AE     Criteria for Skilled Interventions Met (PT) yes  -AE     Therapy Frequency (PT) daily  -AE     Predicted Duration of Therapy Intervention (PT) 1 week  -AE       Row Name 09/11/24 155          Vital Signs    Pre Systolic BP Rehab 131  -AE     Pre Treatment Diastolic BP 90  -AE     Pre SpO2 (%) 99  -AE     O2 Delivery Pre Treatment room air  -AE     O2 Delivery Intra Treatment room air  -AE     Post SpO2 (%) 96  -AE     O2 Delivery Post Treatment room air  -AE     Pre Patient Position Supine  -AE     Intra Patient Position Standing  -AE     Post Patient Position Supine  -AE       Row Name 09/11/24 1556          Positioning and Restraints    Pre-Treatment Position in bed  -AE     Post Treatment Position bed  -AE     In Bed notified nsg;supine;call light within reach;encouraged to call for assist;side rails up x2;legs elevated  -AE               User Key  (r) = Recorded By, (t) = Taken By, (c) = Cosigned By      Initials Name Provider Type    AE Dion Cunningham, PT Physical Therapist                   Outcome  Measures       Row Name 09/11/24 1605 09/11/24 0826       How much help from another person do you currently need...    Turning from your back to your side while in flat bed without using bedrails? 4  -AE 4  -AB    Moving from lying on back to sitting on the side of a flat bed without bedrails? 3  -AE 4  -AB    Moving to and from a bed to a chair (including a wheelchair)? 3  -AE 4  -AB    Standing up from a chair using your arms (e.g., wheelchair, bedside chair)? 3  -AE 4  -AB    Climbing 3-5 steps with a railing? 3  -AE 3  -AB    To walk in hospital room? 3  -AE 3  -AB    AM-PAC 6 Clicks Score (PT) 19  -AE 22  -AB    Highest Level of Mobility Goal 6 --> Walk 10 steps or more  -AE 7 --> Walk 25 feet or more  -AB      Row Name 09/11/24 1605          Functional Assessment    Outcome Measure Options AM-PAC 6 Clicks Basic Mobility (PT)  -AE               User Key  (r) = Recorded By, (t) = Taken By, (c) = Cosigned By      Initials Name Provider Type    AE Dion Cunningham, PT Physical Therapist    Digna Junior, RN Registered Nurse                                 Physical Therapy Education       Title: PT OT SLP Therapies (In Progress)       Topic: Physical Therapy (In Progress)       Point: Mobility training (Done)       Learning Progress Summary             Patient Acceptance, E, VU by AE at 9/11/2024 1150                         Point: Home exercise program (Not Started)       Learner Progress:  Not documented in this visit.              Point: Body mechanics (Done)       Learning Progress Summary             Patient Acceptance, E, VU by AE at 9/11/2024 1150                         Point: Precautions (Done)       Learning Progress Summary             Patient Acceptance, E, VU by AE at 9/11/2024 1150                                         User Key       Initials Effective Dates Name Provider Type Discipline    AE 09/21/21 -  Dion Cunningham, [place] Physical Therapist PT                  PT Recommendation and  Plan  Planned Therapy Interventions (PT): balance training, bed mobility training, gait training, home exercise program, patient/family education, postural re-education, ROM (range of motion), stair training, strengthening, transfer training  Plan of Care Reviewed With: patient  Progress: no change  Outcome Evaluation: Pt presents with decreased functional mobility and decreased activity tolerance compared to baseline. Pt ambulated 325ft with SBA, unsupported. Good effort given despite feeling nauseous. Recommend continued skilled IP PT interventions. Recommend D/C home with assist when medically appropriate.     Time Calculation:   PT Evaluation Complexity  History, PT Evaluation Complexity: 1-2 personal factors and/or comorbidities  Examination of Body Systems (PT Eval Complexity): total of 3 or more elements  Clinical Presentation (PT Evaluation Complexity): stable  Clinical Decision Making (PT Evaluation Complexity): low complexity  Overall Complexity (PT Evaluation Complexity): low complexity     PT Charges       Row Name 09/11/24 1606             Time Calculation    Start Time 1150  -AE      PT Received On 09/11/24  -AE      PT Goal Re-Cert Due Date 09/21/24  -AE         Untimed Charges    PT Eval/Re-eval Minutes 36  -AE         Total Minutes    Untimed Charges Total Minutes 36  -AE       Total Minutes 36  -AE                User Key  (r) = Recorded By, (t) = Taken By, (c) = Cosigned By      Initials Name Provider Type    AE Dion Cunningham PT Physical Therapist                  Therapy Charges for Today       Code Description Service Date Service Provider Modifiers Qty    [id number]  PT EVAL LOW COMPLEXITY 3 9/11/2024 Dion Cunningham PT GP 1            PT G-Codes  Outcome Measure Options: AM-PAC 6 Clicks Basic Mobility (PT)  AM-PAC 6 Clicks Score (PT): 19  PT Discharge Summary  Anticipated Discharge Disposition (PT): home with assist    Dion Cunningham PT  9/11/2024

## 2024-09-11 NOTE — PROGRESS NOTES
"Patient Name:  Nely Castrejon  YOB: 1970  [id number]    Surgery Progress Note    Date of visit: 9/11/2024    Subjective   Subjective: Some soreness, tolerating PO with some expected dysphagia.         Objective     Objective:     /68 (BP Location: Right arm, Patient Position: Lying)   Pulse 79   Temp 98.2 °F (36.8 °C) (Oral)   Resp 18   Ht 160 cm (63\")   Wt 61.7 kg (136 lb 0.4 oz)   SpO2 93%   BMI 24.10 kg/m²     Intake/Output Summary (Last 24 hours) at 9/11/2024 0836  Last data filed at 9/10/2024 1329  Gross per 24 hour   Intake 1100 ml   Output 20 ml   Net 1080 ml       CV:  Rhythm  regular and rate regular   L:  Clear  to auscultation bilaterally   Abd:  Bowel sounds positive , soft, dressings c/d/i  Ext:  No cyanosis, clubbing, edema    Recent labs that are back at this time have been reviewed.            Assessment/ Plan:    GERD- Doing well after Lap Toupet. UGI and teaching. Possibly home later today if stable.         Active Hospital Problems    Diagnosis  POA    **GERD (gastroesophageal reflux disease) [K21.9]  Yes    Hiatal hernia with GERD [K44.9, K21.9]  Yes    Paroxysmal SVT (supraventricular tachycardia) [I47.10]  Yes    Paroxysmal atrial fibrillation [I48.0]  Yes    Primary hypertension [I10]  Yes    Pain syndrome, chronic [G89.4]  Yes      Resolved Hospital Problems   No resolved problems to display.              Eduar Cornell MD  9/11/2024  08:36 EDT      "

## 2024-09-12 VITALS
SYSTOLIC BLOOD PRESSURE: 124 MMHG | OXYGEN SATURATION: 96 % | WEIGHT: 136.02 LBS | BODY MASS INDEX: 24.1 KG/M2 | DIASTOLIC BLOOD PRESSURE: 79 MMHG | HEART RATE: 82 BPM | TEMPERATURE: 97.8 F | RESPIRATION RATE: 18 BRPM | HEIGHT: 63 IN

## 2024-09-12 LAB
ANION GAP SERPL CALCULATED.3IONS-SCNC: 10 MMOL/L (ref 5–15)
BUN SERPL-MCNC: 9 MG/DL (ref 6–20)
BUN/CREAT SERPL: 10.1 (ref 7–25)
CALCIUM SPEC-SCNC: 9.6 MG/DL (ref 8.6–10.5)
CHLORIDE SERPL-SCNC: 98 MMOL/L (ref 98–107)
CO2 SERPL-SCNC: 30 MMOL/L (ref 22–29)
CREAT SERPL-MCNC: 0.89 MG/DL (ref 0.57–1)
DEPRECATED RDW RBC AUTO: 38.7 FL (ref 37–54)
EGFRCR SERPLBLD CKD-EPI 2021: 76.7 ML/MIN/1.73
ERYTHROCYTE [DISTWIDTH] IN BLOOD BY AUTOMATED COUNT: 12.6 % (ref 12.3–15.4)
GLUCOSE SERPL-MCNC: 100 MG/DL (ref 65–99)
HCT VFR BLD AUTO: 40 % (ref 34–46.6)
HGB BLD-MCNC: 13.2 G/DL (ref 12–15.9)
MCH RBC QN AUTO: 28 PG (ref 26.6–33)
MCHC RBC AUTO-ENTMCNC: 33 G/DL (ref 31.5–35.7)
MCV RBC AUTO: 84.9 FL (ref 79–97)
PLATELET # BLD AUTO: 195 10*3/MM3 (ref 140–450)
PMV BLD AUTO: 10.8 FL (ref 6–12)
POTASSIUM SERPL-SCNC: 4.4 MMOL/L (ref 3.5–5.2)
RBC # BLD AUTO: 4.71 10*6/MM3 (ref 3.77–5.28)
SODIUM SERPL-SCNC: 138 MMOL/L (ref 136–145)
WBC NRBC COR # BLD AUTO: 8.47 10*3/MM3 (ref 3.4–10.8)

## 2024-09-12 PROCEDURE — 85027 COMPLETE CBC AUTOMATED: CPT | Performed by: SURGERY

## 2024-09-12 PROCEDURE — 25010000002 HEPARIN (PORCINE) PER 1000 UNITS: Performed by: SURGERY

## 2024-09-12 PROCEDURE — 80048 BASIC METABOLIC PNL TOTAL CA: CPT | Performed by: SURGERY

## 2024-09-12 RX ORDER — SIMETHICONE 80 MG
80 TABLET,CHEWABLE ORAL 4 TIMES DAILY PRN
Qty: 60 TABLET | Refills: 1 | Status: SHIPPED | OUTPATIENT
Start: 2024-09-12

## 2024-09-12 RX ORDER — HYDROCODONE BITARTRATE AND ACETAMINOPHEN 7.5; 325 MG/15ML; MG/15ML
15 SOLUTION ORAL EVERY 4 HOURS PRN
Qty: 165 ML | Refills: 0 | Status: SHIPPED | OUTPATIENT
Start: 2024-09-12

## 2024-09-12 RX ORDER — ONDANSETRON 4 MG/1
4 TABLET, ORALLY DISINTEGRATING ORAL EVERY 6 HOURS PRN
Qty: 60 TABLET | Refills: 1 | Status: SHIPPED | OUTPATIENT
Start: 2024-09-12

## 2024-09-12 RX ORDER — DOCUSATE SODIUM 50 MG/5 ML
100 LIQUID (ML) ORAL DAILY
Qty: 200 ML | Refills: 1 | Status: SHIPPED | OUTPATIENT
Start: 2024-09-12

## 2024-09-12 RX ADMIN — FLECAINIDE ACETATE 50 MG: 50 TABLET ORAL at 09:08

## 2024-09-12 RX ADMIN — LOSARTAN POTASSIUM 50 MG: 50 TABLET, FILM COATED ORAL at 09:08

## 2024-09-12 RX ADMIN — CHLORTHALIDONE 12.5 MG: 25 TABLET ORAL at 09:08

## 2024-09-12 RX ADMIN — DOCUSATE SODIUM 100 MG: 50 LIQUID ORAL at 09:08

## 2024-09-12 RX ADMIN — PANTOPRAZOLE SODIUM 40 MG: 40 TABLET, DELAYED RELEASE ORAL at 05:55

## 2024-09-12 RX ADMIN — HEPARIN SODIUM 5000 UNITS: 5000 INJECTION INTRAVENOUS; SUBCUTANEOUS at 05:55

## 2024-09-12 RX ADMIN — HYDROCODONE BITARTRATE AND ACETAMINOPHEN 15 ML: 7.5; 325 SOLUTION ORAL at 05:57

## 2024-09-12 NOTE — CASE MANAGEMENT/SOCIAL WORK
Continued Stay Note  [facility]     Patient Name: Nely Castrejon  MRN: [id number]  Today's Date: 9/12/2024    Admit Date: 9/10/2024    Plan: home   Discharge Plan       Row Name 09/12/24 7137       Plan    Plan Comments Patient's plan is home today 9/12. Family will transport. No discharge needs identified.    Final Discharge Disposition Code 01 - home or self-care                   Discharge Codes    No documentation.                 Expected Discharge Date and Time       Expected Discharge Date Expected Discharge Time    Sep 12, 2024               Dyan Saucedo RN

## 2024-09-12 NOTE — PLAN OF CARE
Problem: Adult Inpatient Plan of Care  Goal: Plan of Care Review  Outcome: Ongoing, Progressing  Goal: Patient-Specific Goal (Individualized)  Outcome: Ongoing, Progressing  Goal: Absence of Hospital-Acquired Illness or Injury  Outcome: Ongoing, Progressing  Intervention: Identify and Manage Fall Risk  Recent Flowsheet Documentation  Taken 9/12/2024 0000 by Keren Souza RN  Safety Promotion/Fall Prevention:   assistive device/personal items within reach   clutter free environment maintained   fall prevention program maintained   nonskid shoes/slippers when out of bed   room organization consistent   safety round/check completed  Taken 9/11/2024 2000 by Keren Souza RN  Safety Promotion/Fall Prevention:   assistive device/personal items within reach   clutter free environment maintained   fall prevention program maintained   nonskid shoes/slippers when out of bed   room organization consistent   safety round/check completed  Intervention: Prevent Skin Injury  Recent Flowsheet Documentation  Taken 9/12/2024 0000 by Keren Souza RN  Body Position: position changed independently  Skin Protection:   tubing/devices free from skin contact   transparent dressing maintained   skin-to-device areas padded  Taken 9/11/2024 2000 by Keren Souza RN  Body Position: position changed independently  Skin Protection:   tubing/devices free from skin contact   transparent dressing maintained   skin-to-device areas padded  Intervention: Prevent and Manage VTE (Venous Thromboembolism) Risk  Recent Flowsheet Documentation  Taken 9/12/2024 0000 by Keren Souza RN  Activity Management: activity encouraged  Taken 9/11/2024 2000 by Keren Souza RN  Activity Management: activity encouraged  Intervention: Prevent Infection  Recent Flowsheet Documentation  Taken 9/12/2024 0000 by Keren Souza RN  Infection Prevention:   environmental surveillance performed   hand hygiene promoted   personal protective  equipment utilized   single patient room provided  Taken 9/11/2024 2000 by Keren Souza RN  Infection Prevention:   environmental surveillance performed   hand hygiene promoted   personal protective equipment utilized   single patient room provided  Goal: Optimal Comfort and Wellbeing  Outcome: Ongoing, Progressing  Intervention: Provide Person-Centered Care  Recent Flowsheet Documentation  Taken 9/11/2024 2000 by Keren Souza RN  Trust Relationship/Rapport:   care explained   choices provided  Goal: Readiness for Transition of Care  Outcome: Ongoing, Progressing     Problem: Hypertension Comorbidity  Goal: Blood Pressure in Desired Range  Outcome: Ongoing, Progressing     Problem: Pain Chronic (Persistent) (Comorbidity Management)  Goal: Acceptable Pain Control and Functional Ability  Outcome: Ongoing, Progressing  Intervention: Optimize Psychosocial Wellbeing  Recent Flowsheet Documentation  Taken 9/12/2024 0000 by Keren Souza RN  Diversional Activities:   television   smartphone  Family/Support System Care: support provided  Taken 9/11/2024 2000 by Keren Souza RN  Diversional Activities:   television   smartphone  Family/Support System Care: support provided   Goal Outcome Evaluation:

## 2024-09-12 NOTE — DISCHARGE SUMMARY
Discharge Summary    Patient Name:  Nely Castrejon  YOB: 1970  [id number]      DATE OF ADMISSION: 9/10/2024    DATE OF DISCHARGE: 9/12/2024       FINAL DIAGNOSES:     GERD (gastroesophageal reflux disease)    Pain syndrome, chronic    Primary hypertension    Paroxysmal atrial fibrillation    Paroxysmal SVT (supraventricular tachycardia)    Hiatal hernia with GERD       CONSULTING SERVICES: None      PROCEDURES PERFORMED:   1. Laparoscopic Paraesophageal hernia repair with mesh, Toupet fundoplication, EGD  on 9/10/2024    HISTORY: The patient is a very pleasant 54 y.o. female with a history of GERD and paraesophageal hernia  . After a complete preoperative workup she was deemed an operative candidate. The risks and benefits of operation were discussed at length with the patient and their family, and they agreed to proceed.      HOSPITAL COURSE:  The patient came in as an outpatient, underwent her operative procedure, and was transferred to the floor.  Postoperatively she did well.  Their pain was initially controlled on IV PCA, and transitioned to oral medications.  A Gastrografin swallow on postoperative day 1 showed an excellent technical result  . They continued to improve, were instructed on appropriate dietary changes , and ready for discharge home on 9/12/2024 .      CONDITION: At the time of discharge, the patient is tolerating a post-fundoplication  diet without difficulty. They are having normal bowel and bladder function, and her incisions are clean, dry and intact.     DISCHARGE MEDICATIONS:   1. All previous home medications.   2. Hydrocodone   elixir 7.5mg PO Q4 hours as needed for pain  3. Colace elixir 100mg PO BID   4. Zofran 4 mg PO Q6 hours as needed for nausea  5. Simethicone 80 mg p.o. every 6 hours as needed for bloating       DISCHARGE INSTRUCTIONS:  1. The patient is instructed to follow up with Dr. Cornell in  4  weeks’ time.  2. she is instructed to refrain from lifting more  than 15 or 20 pounds until their return to clinic.   3. If the patient has worsening problems with fever or chills nausea or vomiting she is to contact Dr. Cornell's office and a contact card has been provided.        Eduar Cornell MD  9/12/2024

## 2024-09-12 NOTE — PROGRESS NOTES
"Patient Name:  Nely Castrejon  YOB: 1970  [id number]    Surgery Progress Note    Date of visit: 9/12/2024    Subjective   Subjective: Feeling better, tolerating clears.         Objective     Objective:     /94 (BP Location: Left arm, Patient Position: Lying)   Pulse 84   Temp 98.1 °F (36.7 °C) (Oral)   Resp 18   Ht 160 cm (63\")   Wt 61.7 kg (136 lb 0.4 oz)   SpO2 91%   BMI 24.10 kg/m²     Intake/Output Summary (Last 24 hours) at 9/12/2024 0714  Last data filed at 9/11/2024 1810  Gross per 24 hour   Intake 494 ml   Output --   Net 494 ml       CV:  Rhythm  regular and rate regular   L:  Clear  to auscultation bilaterally   Abd:  Bowel sounds positive , soft, appropriately tender. Dressings c/d/i  Ext:  No cyanosis, clubbing, edema    Recent labs that are back at this time have been reviewed. UGI shows excellent technical result           Assessment/ Plan:    GERD with HH- Doing well. Discharge home. RTC with me in 4  weeks. No lifting > 30 lbs until RTC. May remove dressings in 24 hours, may shower at that time.      Problem List Items Addressed This Visit    None       Active Hospital Problems    Diagnosis  POA    **GERD (gastroesophageal reflux disease) [K21.9]  Yes    Hiatal hernia with GERD [K44.9, K21.9]  Yes    Paroxysmal SVT (supraventricular tachycardia) [I47.10]  Yes    Paroxysmal atrial fibrillation [I48.0]  Yes    Primary hypertension [I10]  Yes    Pain syndrome, chronic [G89.4]  Yes      Resolved Hospital Problems   No resolved problems to display.              Eduar Cornell MD  9/12/2024  07:14 EDT      "

## 2024-09-18 PROBLEM — Z98.890 HISTORY OF FUNDOPLICATION: Status: ACTIVE | Noted: 2024-09-18

## 2024-09-20 ENCOUNTER — HOSPITAL ENCOUNTER (EMERGENCY)
Facility: HOSPITAL | Age: 54
Discharge: HOME | End: 2024-09-20
Attending: EMERGENCY MEDICINE
Payer: MEDICARE

## 2024-09-20 VITALS
SYSTOLIC BLOOD PRESSURE: 114 MMHG | RESPIRATION RATE: 16 BRPM | WEIGHT: 140 LBS | HEART RATE: 87 BPM | BODY MASS INDEX: 24.8 KG/M2 | TEMPERATURE: 98.3 F | HEIGHT: 63 IN | OXYGEN SATURATION: 97 % | DIASTOLIC BLOOD PRESSURE: 86 MMHG

## 2024-09-20 DIAGNOSIS — J06.9 VIRAL UPPER RESPIRATORY ILLNESS: Primary | ICD-10-CM

## 2024-09-20 LAB
ALBUMIN SERPL-MCNC: 4.1 G/DL (ref 3.5–5.2)
ALBUMIN/GLOB SERPL: 1.7 G/DL
ALP SERPL-CCNC: 110 U/L (ref 39–117)
ALT SERPL W P-5'-P-CCNC: 18 U/L (ref 1–33)
ANION GAP SERPL CALCULATED.3IONS-SCNC: 8.8 MMOL/L (ref 5–15)
AST SERPL-CCNC: 19 U/L (ref 1–32)
BASOPHILS # BLD AUTO: 0.05 10*3/MM3 (ref 0–0.2)
BASOPHILS NFR BLD AUTO: 0.6 % (ref 0–1.5)
BILIRUB SERPL-MCNC: 0.4 MG/DL (ref 0–1.2)
BUN SERPL-MCNC: 13 MG/DL (ref 6–20)
BUN/CREAT SERPL: 16.3 (ref 7–25)
CALCIUM SPEC-SCNC: 9.3 MG/DL (ref 8.6–10.5)
CHLORIDE SERPL-SCNC: 102 MMOL/L (ref 98–107)
CO2 SERPL-SCNC: 26.2 MMOL/L (ref 22–29)
CREAT SERPL-MCNC: 0.8 MG/DL (ref 0.57–1)
DEPRECATED RDW RBC AUTO: 38.4 FL (ref 37–54)
EGFRCR SERPLBLD CKD-EPI 2021: 87.1 ML/MIN/1.73
EOSINOPHIL # BLD AUTO: 0.14 10*3/MM3 (ref 0–0.4)
EOSINOPHIL NFR BLD AUTO: 1.5 % (ref 0.3–6.2)
ERYTHROCYTE [DISTWIDTH] IN BLOOD BY AUTOMATED COUNT: 12.6 % (ref 12.3–15.4)
FLUAV SUBTYP SPEC NAA+PROBE: NOT DETECTED
FLUBV RNA ISLT QL NAA+PROBE: NOT DETECTED
GLOBULIN UR ELPH-MCNC: 2.4 GM/DL
GLUCOSE SERPL-MCNC: 96 MG/DL (ref 65–99)
HCT VFR BLD AUTO: 37.1 % (ref 34–46.6)
HGB BLD-MCNC: 12.7 G/DL (ref 12–15.9)
IMM GRANULOCYTES # BLD AUTO: 0.02 10*3/MM3 (ref 0–0.05)
IMM GRANULOCYTES NFR BLD AUTO: 0.2 % (ref 0–0.5)
LYMPHOCYTES # BLD AUTO: 1.16 10*3/MM3 (ref 0.7–3.1)
LYMPHOCYTES NFR BLD AUTO: 12.8 % (ref 19.6–45.3)
MCH RBC QN AUTO: 28.7 PG (ref 26.6–33)
MCHC RBC AUTO-ENTMCNC: 34.2 G/DL (ref 31.5–35.7)
MCV RBC AUTO: 83.7 FL (ref 79–97)
MONOCYTES # BLD AUTO: 0.71 10*3/MM3 (ref 0.1–0.9)
MONOCYTES NFR BLD AUTO: 7.8 % (ref 5–12)
NEUTROPHILS NFR BLD AUTO: 6.99 10*3/MM3 (ref 1.7–7)
NEUTROPHILS NFR BLD AUTO: 77.1 % (ref 42.7–76)
NRBC BLD AUTO-RTO: 0 /100 WBC (ref 0–0.2)
PLATELET # BLD AUTO: 180 10*3/MM3 (ref 140–450)
PMV BLD AUTO: 11.2 FL (ref 6–12)
POTASSIUM SERPL-SCNC: 3.7 MMOL/L (ref 3.5–5.2)
PROT SERPL-MCNC: 6.5 G/DL (ref 6–8.5)
RBC # BLD AUTO: 4.43 10*6/MM3 (ref 3.77–5.28)
SARS-COV-2 RNA RESP QL NAA+PROBE: NOT DETECTED
SODIUM SERPL-SCNC: 137 MMOL/L (ref 136–145)
WBC NRBC COR # BLD AUTO: 9.07 10*3/MM3 (ref 3.4–10.8)

## 2024-09-20 PROCEDURE — 85025 COMPLETE CBC W/AUTO DIFF WBC: CPT | Performed by: NURSE PRACTITIONER

## 2024-09-20 PROCEDURE — 25810000003 SODIUM CHLORIDE 0.9 % SOLUTION: Performed by: NURSE PRACTITIONER

## 2024-09-20 PROCEDURE — 99283 EMERGENCY DEPT VISIT LOW MDM: CPT

## 2024-09-20 PROCEDURE — 80053 COMPREHEN METABOLIC PANEL: CPT | Performed by: NURSE PRACTITIONER

## 2024-09-20 PROCEDURE — 87636 SARSCOV2 & INF A&B AMP PRB: CPT | Performed by: NURSE PRACTITIONER

## 2024-09-20 RX ORDER — SODIUM CHLORIDE 0.9 % (FLUSH) 0.9 %
10 SYRINGE (ML) INJECTION AS NEEDED
Status: DISCONTINUED | OUTPATIENT
Start: 2024-09-20 | End: 2024-09-20 | Stop reason: HOSPADM

## 2024-09-20 RX ADMIN — SODIUM CHLORIDE 1000 ML: 9 INJECTION, SOLUTION INTRAVENOUS at 13:03

## 2024-10-02 ENCOUNTER — HOSPITAL ENCOUNTER (EMERGENCY)
Facility: HOSPITAL | Age: 54
Discharge: OTHER INSTITUTION | End: 2024-10-02
Attending: STUDENT IN AN ORGANIZED HEALTH CARE EDUCATION/TRAINING PROGRAM
Payer: MEDICARE

## 2024-10-02 ENCOUNTER — HOSPITAL ENCOUNTER (OUTPATIENT)
Facility: HOSPITAL | Age: 54
Setting detail: OBSERVATION
LOS: 1 days | Discharge: HOME | End: 2024-10-04
Attending: STUDENT IN AN ORGANIZED HEALTH CARE EDUCATION/TRAINING PROGRAM | Admitting: SURGERY
Payer: MEDICARE

## 2024-10-02 ENCOUNTER — APPOINTMENT (OUTPATIENT)
Dept: CT IMAGING | Facility: HOSPITAL | Age: 54
End: 2024-10-02
Payer: MEDICARE

## 2024-10-02 VITALS
SYSTOLIC BLOOD PRESSURE: 124 MMHG | TEMPERATURE: 97.9 F | WEIGHT: 140 LBS | HEIGHT: 63 IN | BODY MASS INDEX: 24.8 KG/M2 | DIASTOLIC BLOOD PRESSURE: 90 MMHG | RESPIRATION RATE: 18 BRPM | OXYGEN SATURATION: 99 % | HEART RATE: 77 BPM

## 2024-10-02 DIAGNOSIS — Z98.890 STATUS POST LAPAROSCOPIC FUNDOPLICATION: ICD-10-CM

## 2024-10-02 DIAGNOSIS — R13.12 OROPHARYNGEAL DYSPHAGIA: Primary | ICD-10-CM

## 2024-10-02 DIAGNOSIS — E63.9 INADEQUATE ORAL NUTRITIONAL INTAKE: ICD-10-CM

## 2024-10-02 DIAGNOSIS — R13.10 DYSPHAGIA, UNSPECIFIED TYPE: Primary | ICD-10-CM

## 2024-10-02 LAB
ALBUMIN SERPL-MCNC: 4.5 G/DL (ref 3.5–5.2)
ALBUMIN/GLOB SERPL: 1.5 G/DL
ALP SERPL-CCNC: 109 U/L (ref 39–117)
ALT SERPL W P-5'-P-CCNC: 21 U/L (ref 1–33)
ANION GAP SERPL CALCULATED.3IONS-SCNC: 13.3 MMOL/L (ref 5–15)
AST SERPL-CCNC: 24 U/L (ref 1–32)
BASOPHILS # BLD AUTO: 0.05 10*3/MM3 (ref 0–0.2)
BASOPHILS NFR BLD AUTO: 0.6 % (ref 0–1.5)
BILIRUB SERPL-MCNC: 0.6 MG/DL (ref 0–1.2)
BUN SERPL-MCNC: 19 MG/DL (ref 6–20)
BUN/CREAT SERPL: 25.3 (ref 7–25)
CALCIUM SPEC-SCNC: 9.5 MG/DL (ref 8.6–10.5)
CHLORIDE SERPL-SCNC: 101 MMOL/L (ref 98–107)
CO2 SERPL-SCNC: 26.7 MMOL/L (ref 22–29)
CREAT SERPL-MCNC: 0.75 MG/DL (ref 0.57–1)
DEPRECATED RDW RBC AUTO: 40 FL (ref 37–54)
EGFRCR SERPLBLD CKD-EPI 2021: 94.2 ML/MIN/1.73
EOSINOPHIL # BLD AUTO: 0.07 10*3/MM3 (ref 0–0.4)
EOSINOPHIL NFR BLD AUTO: 0.9 % (ref 0.3–6.2)
ERYTHROCYTE [DISTWIDTH] IN BLOOD BY AUTOMATED COUNT: 13.1 % (ref 12.3–15.4)
GLOBULIN UR ELPH-MCNC: 3 GM/DL
GLUCOSE SERPL-MCNC: 88 MG/DL (ref 65–99)
HCT VFR BLD AUTO: 42.4 % (ref 34–46.6)
HGB BLD-MCNC: 14.4 G/DL (ref 12–15.9)
HOLD SPECIMEN: NORMAL
HOLD SPECIMEN: NORMAL
IMM GRANULOCYTES # BLD AUTO: 0.01 10*3/MM3 (ref 0–0.05)
IMM GRANULOCYTES NFR BLD AUTO: 0.1 % (ref 0–0.5)
LIPASE SERPL-CCNC: 17 U/L (ref 13–60)
LYMPHOCYTES # BLD AUTO: 1.99 10*3/MM3 (ref 0.7–3.1)
LYMPHOCYTES NFR BLD AUTO: 24.3 % (ref 19.6–45.3)
MCH RBC QN AUTO: 28.7 PG (ref 26.6–33)
MCHC RBC AUTO-ENTMCNC: 34 G/DL (ref 31.5–35.7)
MCV RBC AUTO: 84.6 FL (ref 79–97)
MONOCYTES # BLD AUTO: 0.49 10*3/MM3 (ref 0.1–0.9)
MONOCYTES NFR BLD AUTO: 6 % (ref 5–12)
NEUTROPHILS NFR BLD AUTO: 5.58 10*3/MM3 (ref 1.7–7)
NEUTROPHILS NFR BLD AUTO: 68.1 % (ref 42.7–76)
NRBC BLD AUTO-RTO: 0 /100 WBC (ref 0–0.2)
PLATELET # BLD AUTO: 199 10*3/MM3 (ref 140–450)
PMV BLD AUTO: 11.7 FL (ref 6–12)
POTASSIUM SERPL-SCNC: 3.5 MMOL/L (ref 3.5–5.2)
PROT SERPL-MCNC: 7.5 G/DL (ref 6–8.5)
RBC # BLD AUTO: 5.01 10*6/MM3 (ref 3.77–5.28)
SODIUM SERPL-SCNC: 141 MMOL/L (ref 136–145)
TROPONIN T SERPL HS-MCNC: <6 NG/L
WBC NRBC COR # BLD AUTO: 8.19 10*3/MM3 (ref 3.4–10.8)
WHOLE BLOOD HOLD COAG: NORMAL
WHOLE BLOOD HOLD SPECIMEN: NORMAL

## 2024-10-02 PROCEDURE — 25810000003 SODIUM CHLORIDE 0.9 % SOLUTION: Performed by: STUDENT IN AN ORGANIZED HEALTH CARE EDUCATION/TRAINING PROGRAM

## 2024-10-02 PROCEDURE — 99285 EMERGENCY DEPT VISIT HI MDM: CPT

## 2024-10-02 PROCEDURE — 25510000001 IOPAMIDOL 61 % SOLUTION: Performed by: STUDENT IN AN ORGANIZED HEALTH CARE EDUCATION/TRAINING PROGRAM

## 2024-10-02 PROCEDURE — 74177 CT ABD & PELVIS W/CONTRAST: CPT

## 2024-10-02 PROCEDURE — 93005 ELECTROCARDIOGRAM TRACING: CPT

## 2024-10-02 PROCEDURE — 0 DIATRIZOATE MEGLUMINE & SODIUM PER 1 ML: Performed by: STUDENT IN AN ORGANIZED HEALTH CARE EDUCATION/TRAINING PROGRAM

## 2024-10-02 PROCEDURE — 25810000003 LACTATED RINGERS PER 1000 ML: Performed by: SURGERY

## 2024-10-02 PROCEDURE — 84484 ASSAY OF TROPONIN QUANT: CPT

## 2024-10-02 PROCEDURE — G0378 HOSPITAL OBSERVATION PER HR: HCPCS

## 2024-10-02 PROCEDURE — 96374 THER/PROPH/DIAG INJ IV PUSH: CPT

## 2024-10-02 PROCEDURE — 71260 CT THORAX DX C+: CPT

## 2024-10-02 PROCEDURE — 80053 COMPREHEN METABOLIC PANEL: CPT | Performed by: STUDENT IN AN ORGANIZED HEALTH CARE EDUCATION/TRAINING PROGRAM

## 2024-10-02 PROCEDURE — 96361 HYDRATE IV INFUSION ADD-ON: CPT

## 2024-10-02 PROCEDURE — 96375 TX/PRO/DX INJ NEW DRUG ADDON: CPT

## 2024-10-02 PROCEDURE — 25010000002 ONDANSETRON PER 1 MG: Performed by: STUDENT IN AN ORGANIZED HEALTH CARE EDUCATION/TRAINING PROGRAM

## 2024-10-02 PROCEDURE — 36415 COLL VENOUS BLD VENIPUNCTURE: CPT

## 2024-10-02 PROCEDURE — 83690 ASSAY OF LIPASE: CPT | Performed by: STUDENT IN AN ORGANIZED HEALTH CARE EDUCATION/TRAINING PROGRAM

## 2024-10-02 PROCEDURE — 85025 COMPLETE CBC W/AUTO DIFF WBC: CPT | Performed by: STUDENT IN AN ORGANIZED HEALTH CARE EDUCATION/TRAINING PROGRAM

## 2024-10-02 RX ORDER — IOPAMIDOL 612 MG/ML
100 INJECTION, SOLUTION INTRAVASCULAR
Status: COMPLETED | OUTPATIENT
Start: 2024-10-02 | End: 2024-10-02

## 2024-10-02 RX ORDER — ENALAPRILAT 1.25 MG/ML
1.25 INJECTION INTRAVENOUS EVERY 6 HOURS PRN
Status: DISCONTINUED | OUTPATIENT
Start: 2024-10-02 | End: 2024-10-04 | Stop reason: HOSPADM

## 2024-10-02 RX ORDER — DIATRIZOATE MEGLUMINE AND DIATRIZOATE SODIUM 660; 100 MG/ML; MG/ML
15 SOLUTION ORAL; RECTAL
Status: COMPLETED | OUTPATIENT
Start: 2024-10-02 | End: 2024-10-02

## 2024-10-02 RX ORDER — CHLORTHALIDONE 25 MG/1
12.5 TABLET ORAL DAILY
Status: DISCONTINUED | OUTPATIENT
Start: 2024-10-02 | End: 2024-10-04 | Stop reason: HOSPADM

## 2024-10-02 RX ORDER — DOCUSATE SODIUM 50 MG/5 ML
100 LIQUID (ML) ORAL DAILY
Status: DISCONTINUED | OUTPATIENT
Start: 2024-10-02 | End: 2024-10-03 | Stop reason: SDUPTHER

## 2024-10-02 RX ORDER — ONDANSETRON 2 MG/ML
4 INJECTION INTRAMUSCULAR; INTRAVENOUS ONCE
Status: COMPLETED | OUTPATIENT
Start: 2024-10-02 | End: 2024-10-02

## 2024-10-02 RX ORDER — SODIUM CHLORIDE 9 MG/ML
40 INJECTION, SOLUTION INTRAVENOUS AS NEEDED
Status: DISCONTINUED | OUTPATIENT
Start: 2024-10-02 | End: 2024-10-04 | Stop reason: HOSPADM

## 2024-10-02 RX ORDER — POTASSIUM CHLORIDE 1500 MG/1
40 TABLET, EXTENDED RELEASE ORAL EVERY 4 HOURS
Status: DISPENSED | OUTPATIENT
Start: 2024-10-02 | End: 2024-10-03

## 2024-10-02 RX ORDER — ONDANSETRON 4 MG/1
4 TABLET, ORALLY DISINTEGRATING ORAL EVERY 6 HOURS PRN
Status: DISCONTINUED | OUTPATIENT
Start: 2024-10-02 | End: 2024-10-04 | Stop reason: HOSPADM

## 2024-10-02 RX ORDER — LOSARTAN POTASSIUM 50 MG/1
50 TABLET ORAL
Status: DISCONTINUED | OUTPATIENT
Start: 2024-10-02 | End: 2024-10-04 | Stop reason: HOSPADM

## 2024-10-02 RX ORDER — SODIUM CHLORIDE 0.9 % (FLUSH) 0.9 %
10 SYRINGE (ML) INJECTION AS NEEDED
Status: DISCONTINUED | OUTPATIENT
Start: 2024-10-02 | End: 2024-10-04 | Stop reason: HOSPADM

## 2024-10-02 RX ORDER — SIMETHICONE 80 MG
80 TABLET,CHEWABLE ORAL 4 TIMES DAILY PRN
Status: DISCONTINUED | OUTPATIENT
Start: 2024-10-02 | End: 2024-10-04 | Stop reason: HOSPADM

## 2024-10-02 RX ORDER — FLECAINIDE ACETATE 50 MG/1
75 TABLET ORAL 2 TIMES DAILY
Status: DISCONTINUED | OUTPATIENT
Start: 2024-10-02 | End: 2024-10-04 | Stop reason: HOSPADM

## 2024-10-02 RX ORDER — PANTOPRAZOLE SODIUM 40 MG/10ML
40 INJECTION, POWDER, LYOPHILIZED, FOR SOLUTION INTRAVENOUS ONCE
Status: COMPLETED | OUTPATIENT
Start: 2024-10-02 | End: 2024-10-02

## 2024-10-02 RX ORDER — PANTOPRAZOLE SODIUM 40 MG/1
40 TABLET, DELAYED RELEASE ORAL
Status: DISCONTINUED | OUTPATIENT
Start: 2024-10-03 | End: 2024-10-04 | Stop reason: HOSPADM

## 2024-10-02 RX ORDER — HYDROCODONE BITARTRATE AND ACETAMINOPHEN 7.5; 325 MG/15ML; MG/15ML
15 SOLUTION ORAL EVERY 4 HOURS PRN
Status: DISCONTINUED | OUTPATIENT
Start: 2024-10-02 | End: 2024-10-04 | Stop reason: HOSPADM

## 2024-10-02 RX ORDER — SODIUM CHLORIDE 0.9 % (FLUSH) 0.9 %
10 SYRINGE (ML) INJECTION EVERY 12 HOURS SCHEDULED
Status: DISCONTINUED | OUTPATIENT
Start: 2024-10-02 | End: 2024-10-04 | Stop reason: HOSPADM

## 2024-10-02 RX ORDER — SODIUM CHLORIDE, SODIUM LACTATE, POTASSIUM CHLORIDE, CALCIUM CHLORIDE 600; 310; 30; 20 MG/100ML; MG/100ML; MG/100ML; MG/100ML
50 INJECTION, SOLUTION INTRAVENOUS CONTINUOUS
Status: DISCONTINUED | OUTPATIENT
Start: 2024-10-02 | End: 2024-10-04

## 2024-10-02 RX ADMIN — Medication 10 ML: at 21:34

## 2024-10-02 RX ADMIN — SODIUM CHLORIDE 1000 ML: 9 INJECTION, SOLUTION INTRAVENOUS at 11:02

## 2024-10-02 RX ADMIN — DIATRIZOATE MEGLUMINE AND DIATRIZOATE SODIUM 15 ML: 660; 100 LIQUID ORAL; RECTAL at 11:58

## 2024-10-02 RX ADMIN — FLECAINIDE ACETATE 75 MG: 50 TABLET ORAL at 20:31

## 2024-10-02 RX ADMIN — DOCUSATE SODIUM 100 MG: 50 LIQUID ORAL at 17:13

## 2024-10-02 RX ADMIN — ONDANSETRON 4 MG: 2 INJECTION INTRAMUSCULAR; INTRAVENOUS at 11:06

## 2024-10-02 RX ADMIN — SODIUM CHLORIDE, POTASSIUM CHLORIDE, SODIUM LACTATE AND CALCIUM CHLORIDE 50 ML/HR: 600; 310; 30; 20 INJECTION, SOLUTION INTRAVENOUS at 17:07

## 2024-10-02 RX ADMIN — IOPAMIDOL 100 ML: 612 INJECTION, SOLUTION INTRAVENOUS at 11:57

## 2024-10-02 RX ADMIN — PANTOPRAZOLE SODIUM 40 MG: 40 INJECTION, POWDER, FOR SOLUTION INTRAVENOUS at 11:13

## 2024-10-02 RX ADMIN — POTASSIUM CHLORIDE 40 MEQ: 1500 TABLET, EXTENDED RELEASE ORAL at 17:12

## 2024-10-02 NOTE — PROGRESS NOTES
Pt initially accepted by hospitalist service with gen surg to consult. Discussed w Dr. Cornell, who elected to take over as primary w no current need for hospitalist service. If any need arises for hospitalist during admission, please consult our service.

## 2024-10-02 NOTE — ED NOTES
Report called to Malou PORRAS at [facility]. Pt is a direct admit to [facility]. Pt going POV and EMTALA provided.

## 2024-10-02 NOTE — ED NOTES
CALL PLACED TO [facility] BALBUENA, [facility], PER ANTONIO ALEXANDER PA-C. REQUESTED TO SPEAK WITH JENNIFER ARCOS MD, GENERAL SURGERY WHO PERFORMED PROCEDURE ON THIS PT ON 9/10/24. JIMY FROM [facility] WILL CALL BACK. CHRISTOPH MADE AWARE.

## 2024-10-02 NOTE — H&P
"Patient Name:  Nely Castrejon  YOB: 1970  [id number]    Date of Service: 10/2/2024       Patient Care Team:  Jie Mayo MD as PCP - General (Family Medicine)  Jm Mills MD as Consulting Physician (Cardiology)  Andrei Raphael MD as Consulting Physician (Cardiac Electrophysiology)  Shala Rowe MD as Surgeon (General Surgery)  Corby Owens MD as Consulting Physician (Rheumatology)  Eduar Cornell MD as Consulting Physician (General Surgery)  Quirino Craig MD as Consulting Physician (Otolaryngology)      General Surgery History and Physical    Date: 10/02/24    Chief Complaint : Dysphagia    Subjective      Patient is a 54 y.o. female who presents with dysphagia.  She has a known past medical history of atrial fibrillation on chronic Xarelto, fibromyalgia, hypertension, and recent laparoscopic Toupet fundoplication performed on 9/10/2024.  She was doing well at home but reports progressive dysphagia over the last several days.  She will keep some fluids down but any softer foods tend to get stuck.  She reports decreased p.o. intake.  She presented to [facility] earlier today with these complaints and subsequent CT scanning was initially read as a leak at the level of her fundoplication.  On my view, she does not have a leak, and does have a patent GE junction,.  The \"leak\" was in fact her felt pledgets that were placed at the time of her surgery.  However she has significant dysphagia, and evidence of dehydration.  She was therefore admitted for observation and placement of a PEG tube to be planned tomorrow to aid in her nutritional status while her postoperative swelling decreases.  She denies any fevers or chills.  She does have some regurgitation symptoms at times.      Allergy: No Known Allergies    Medications:  chlorthalidone, 12.5 mg, Oral, Daily  docusate sodium, 100 mg, Oral, Daily  flecainide, 75 mg, Oral, BID  losartan, 50 mg, Oral, " Q24H  [START ON 10/3/2024] pantoprazole, 40 mg, Oral, Q AM  [Held by provider] rivaroxaban, 20 mg, Oral, Daily With Dinner  sodium chloride, 10 mL, Intravenous, Q12H      lactated ringers, 50 mL/hr      Current Facility-Administered Medications on File Prior to Encounter   Medication    [COMPLETED] diatrizoate meglumine-sodium (GASTROGRAFIN) 66-10 % oral solution 15 mL    [COMPLETED] iopamidol (ISOVUE-300) 61 % injection 100 mL    [COMPLETED] ondansetron (ZOFRAN) injection 4 mg    [COMPLETED] pantoprazole (PROTONIX) injection 40 mg    [COMPLETED] sodium chloride 0.9 % bolus 1,000 mL     Current Outpatient Medications on File Prior to Encounter   Medication Sig    chlorthalidone (HYGROTON) 25 MG tablet Take 0.5 tablets by mouth Daily. (Patient taking differently: Take 0.5 tablets by mouth Every Morning.)    Cyanocobalamin (B-12) 1000 MCG capsule take 1 capsule by mouth daily (Patient taking differently: Take 1 capsule by mouth Daily.)    dexlansoprazole (Dexilant) 60 MG capsule Take 1 capsule by mouth Daily. Indications: Gastroesophageal Reflux Disease    docusate sodium (COLACE) 50 mg/5 mL liquid Take 10 mL by mouth Daily.    famotidine (PEPCID) 40 MG tablet Take 1 tablet by mouth Daily.    Ferrous Sulfate (IRON PO) Take  by mouth Daily.    flecainide (TAMBOCOR) 50 MG tablet Take 1.5 tablets by mouth 2 (Two) Times a Day.    HYDROcodone-acetaminophen (HYCET) 7.5-325 MG/15ML solution Take 15 mL by mouth Every 4 (Four) Hours As Needed for Moderate Pain.    irbesartan (Avapro) 150 MG tablet Take 1 tablet by mouth Every Night.    ondansetron ODT (ZOFRAN-ODT) 4 MG disintegrating tablet Take 1 tablet by mouth Every 6 (Six) Hours As Needed for Nausea or Vomiting.    rivaroxaban (Xarelto) 20 MG tablet Take 1 tablet by mouth Daily With Dinner. Restart on 1/6/24    simethicone (MYLICON) 80 MG chewable tablet Chew 1 tablet 4 (Four) Times a Day As Needed for Flatulence (bloating).    vitamin B-6 (PYRIDOXINE) 50 MG tablet Take 1  "tablet by mouth Daily.    vitamin D3 125 MCG (5000 UT) capsule capsule Take 1 capsule by mouth Daily.       PMHx:   Past Medical History:   Diagnosis Date    Arthritis     left side    Atrial fibrillation     xarelto    B12 deficiency 08/08/2016    Chronic cough     x2 years    Fibromyalgia, primary     GERD (gastroesophageal reflux disease)     History of osteoarthritis 08/01/2016    Hypertension     Pacemaker 2013    bradycardia    Sick sinus syndrome     Trochanteric bursitis of left hip 02/19/2021    Vitamin B12 deficiency          Past Surgical History:   Past Surgical History:   Procedure Laterality Date    COLON RESECTION      \"twisted colon\"    COLONOSCOPY  01/09/2020    [facility] - Dr. Katie Melgar - colon tortuous but otherwise normal. 10 year follow up screening recommended.    ENDOSCOPY      ENDOSCOPY N/A 08/28/2023    Procedure: ESOPHAGOGASTRODUODENOSCOPY WITH BIOPSY and DILATION;  Surgeon: Shala Rowe MD;  Location:  [facility];  Service: Gastroenterology;  Laterality: N/A;    ESOPHAGEAL MOTILITY STUDY N/A 06/20/2024    Procedure: ESOPHAGEAL MOTILITY STUDY;  Surgeon: Eduar Cornell MD;  Location:  [facility];  Service: General;  Laterality: N/A;  PROBE WAS INTRODUCED INTO RIGHT NARE AND ADVANCED TO 48CM.  LES NOTED AT 39.7CM PT TOLERATED PROCEDURE WELL,    GASTRIC PH MONITORING 24HR N/A 06/20/2024    Procedure: GASTRIC PH MONITORING 24HR;  Surgeon: Eduar Cornell MD;  Location:  [facility];  Service: General;  Laterality: N/A;  PH PROBE PLACED @34.5CM    LAPAROSCOPIC CHOLECYSTECTOMY      NISSEN FUNDOPLICATION N/A 9/10/2024    Procedure: LAPAROSCOPIC TOUPET FUNDOPLICATION WITH MESH, EGD;  Surgeon: Eduar Cornell MD;  Location:  [facility];  Service: General;  Laterality: N/A;    PACEMAKER IMPLANTATION Left 2013    PACEMAKER REPLACEMENT N/A 01/03/2024    Procedure: PPM generator change - dual-SJM-hold Xarelto 2 days.;  Surgeon: Andrei Raphael MD;  Location: " [facility];  Service: Cardiology;  Laterality: N/A;  Will need echo day of procedure for hx of MVP. Battery @ [facility] on 12/1/2023. Chronically elevated RV threshold-V paced 1%.    SHOULDER SURGERY Left     bursitis    SUBTOTAL HYSTERECTOMY      Partial          Family History: Noncontributory     Social History: Pt lives in [place].    Tobacco use: Denies    EtOH use : Denies    Illicit drug use: Denies      Review of Systems        Constitutional: No fevers, chills or malaise   Eyes: Denies visual changes    Cardiovascular: Denies chest pain, palpitations   Pulmonary: Denies cough or shortness of breath   Abdominal/ GI: See HPI    Genitourinary: Denies dysuria or hematuria   Musculoskeletal: Denies any but chronic joint aches, pains or deformities   Psychiatric: No recent mood changes   Neurologic: No paresthesias or loss of function          Objective     Physical Exam:      Vital Signs  /86 (BP Location: Left arm, Patient Position: Lying)   Resp 18   No intake or output data in the 24 hours ending 10/02/24 1607      Physical Exam:    Head: Normocephalic, atraumatic.   Eyes: Pupils equal, round, react to light and accommodation.   Mouth: Oral mucosa without lesions,   Neck: No masses, lymphadenopathy or carotid bruits bilaterally   CV: Rhythm  regular and rate regular  , no  murmurs, rubs or gallops  Lungs: Clear   to auscultation bilaterally   Abdomen: Bowel sounds positive  , soft, nontender, incisions clean dry and intact  Groin : No obvious hernias bilaterally   Extremities:  No cyanosis, clubbing or edema bilaterally   Lymphatics: No abnormal lymphadenopathy appreciated   Neurologic: No gross deficits         Results Review:   I have personally reviewed all of the recent lab and imaging results available at this time.  Laboratory exam shows a white count of 8000 without left shift.  Hemoglobin is 14.4 with a plate count of 199.  Comprehensive metabolic panel essentially normal.   "Creatinine 0.75.    CT scan of the abdomen and pelvis as well as CT scan of the chest were personally viewed by me as well as the final dictated and edited report.  This does not demonstrate leakage of contrast as indicated by the radiology report, but rather the \"leak they describe is in fact the pledgets used to close her diaphragmatic hiatus.  There is oral contrast in the esophagus and continuing down into the stomach.  There is no free air or free fluid.  There is no evidence of bowel obstruction.           Assessment and Plan:    Dysphagia- She has evidence of persistent postoperative dysphagia, likely related to recent fundoplication.  I think the best course of action would be placement of a PEG tube tomorrow.  This would allow us to supplement her oral intake as her postoperative swelling decreases.  I have discussed this at length with the patient and her family, and they are agreeable to proceed.  We will hold her Xarelto in anticipation of the procedure.    Atrial fibrillation  Hypertension    Active Hospital Problems    Diagnosis  POA    **Dysphagia [R13.10]  Yes      Resolved Hospital Problems   No resolved problems to display.              I discussed the patient's findings and my recommendations with the patient and/or family, as well as the primary team     Eduar Cornell MD  10/02/24  16:07 EDT      "

## 2024-10-02 NOTE — ED PROVIDER NOTES
Subjective  History of Present Illness:    This is a 54-year-old female presenting today for evaluation of vomiting, diarrhea, postop location.  Patient is status post laparoscopic paraesophageal hernia repair with mesh, Toupet fundoplication, and [facility] on 9[facility]10/2024 [facility]  Postoperatively, the patient had a Gastrografin swallow that showed excellent technical results per chart review.  She reports that she was instructed to do a liquid diet, and transition to soft foods on a certain date, she reports that as she try to transition to soft foods with potato soup, it became lodged in her esophagus, and vomited it back up.  She reports that since then, she has had even difficulty getting liquids down and tells me that she may be getting a cup of water down every other day.  Patient endorses that she feels extremely weak and fatigued and feels like she is going to pass out at times with standing up.  Patient is anticoagulated on Xarelto for atrial fibrillation and also has a pacemaker in place due to history of sick sinus syndrome.  She notes that she tolerates her own secretions okay most of the time.  Tells me that she feels like she is having some rib pain secondary to vomiting but denies any shortness of breath.  Endorses some mild upper epigastric abdominal discomfort.  No hematochezia, no melena.  She reports that if she is able to get down any liquid, it runs straight through her and she experiences diarrhea.      Nurses Notes reviewed and agree, including vitals, allergies, social history and prior medical history.     REVIEW OF SYSTEMS: All systems reviewed and not pertinent unless noted.  Review of Systems   HENT:  Positive for trouble swallowing.    Respiratory:  Negative for shortness of breath.    Cardiovascular:         Rib pain   Gastrointestinal:  Positive for abdominal pain, diarrhea and vomiting. Negative for nausea.       Past Medical History:   Diagnosis Date    Arthritis   "   left side    Atrial fibrillation     xarelto    B12 deficiency 08/08/2016    Chronic cough     x2 years    Fibromyalgia, primary     GERD (gastroesophageal reflux disease)     History of osteoarthritis 08/01/2016    Hypertension     Pacemaker 2013    bradycardia    Sick sinus syndrome     Trochanteric bursitis of left hip 02/19/2021    Vitamin B12 deficiency        Allergies:    Patient has no known allergies.      Past Surgical History:   Procedure Laterality Date    COLON RESECTION      \"twisted colon\"    COLONOSCOPY  01/09/2020    [facility] - Dr. Katie Melgar - colon tortuous but otherwise normal. 10 year follow up screening recommended.    ENDOSCOPY      ENDOSCOPY N/A 08/28/2023    Procedure: ESOPHAGOGASTRODUODENOSCOPY WITH BIOPSY and DILATION;  Surgeon: Shala Rowe MD;  Location:  [facility];  Service: Gastroenterology;  Laterality: N/A;    ESOPHAGEAL MOTILITY STUDY N/A 06/20/2024    Procedure: ESOPHAGEAL MOTILITY STUDY;  Surgeon: Eduar Cornell MD;  Location:  [facility];  Service: General;  Laterality: N/A;  PROBE WAS INTRODUCED INTO RIGHT NARE AND ADVANCED TO 48CM.  LES NOTED AT 39.7CM PT TOLERATED PROCEDURE WELL,    GASTRIC PH MONITORING 24HR N/A 06/20/2024    Procedure: GASTRIC PH MONITORING 24HR;  Surgeon: Eduar Cornell MD;  Location:  [facility];  Service: General;  Laterality: N/A;  PH PROBE PLACED @34.5CM    LAPAROSCOPIC CHOLECYSTECTOMY      NISSEN FUNDOPLICATION N/A 9/10/2024    Procedure: LAPAROSCOPIC TOUPET FUNDOPLICATION WITH MESH, EGD;  Surgeon: Eduar Cornell MD;  Location:  [facility];  Service: General;  Laterality: N/A;    PACEMAKER IMPLANTATION Left 2013    PACEMAKER REPLACEMENT N/A 01/03/2024    Procedure: PPM generator change - dual-SJM-hold Xarelto 2 days.;  Surgeon: Andrei Raphael MD;  Location:  [facility];  Service: Cardiology;  Laterality: N/A;  Will need echo day of procedure for hx of MVP. Battery @ [facility] on 12/1/2023. " "Chronically elevated RV threshold-V paced 1%.    SHOULDER SURGERY Left     bursitis    SUBTOTAL HYSTERECTOMY      Partial          Social History     Socioeconomic History    Marital status:    Tobacco Use    Smoking status: Never     Passive exposure: Current    Smokeless tobacco: Never   Vaping Use    Vaping status: Never Used   Substance and Sexual Activity    Alcohol use: Never    Drug use: Never    Sexual activity: Defer         Family History   Problem Relation Age of Onset    Arthritis Maternal Grandmother     Hypertension Maternal Grandmother     Kidney disease Maternal Grandmother     Breast cancer Paternal Aunt        Objective  Physical Exam:  /90 (BP Location: Left arm, Patient Position: Sitting)   Pulse 77   Temp 97.9 °F (36.6 °C) (Oral)   Resp 18   Ht 160 cm (63\")   Wt 63.5 kg (140 lb)   SpO2 99%   BMI 24.80 kg/m²      Physical Exam  Vitals and nursing note reviewed.   Constitutional:       General: She is not in acute distress.     Appearance: Normal appearance. She is normal weight. She is not ill-appearing, toxic-appearing or diaphoretic.   HENT:      Head: Normocephalic and atraumatic.      Nose: Nose normal.      Mouth/Throat:      Mouth: Mucous membranes are dry.      Pharynx: Oropharynx is clear.   Eyes:      Extraocular Movements: Extraocular movements intact.   Cardiovascular:      Rate and Rhythm: Normal rate and regular rhythm.      Pulses: Normal pulses.      Heart sounds: Normal heart sounds.   Pulmonary:      Effort: Pulmonary effort is normal. No respiratory distress.      Breath sounds: Normal breath sounds. No stridor. No wheezing, rhonchi or rales.   Abdominal:      General: There is no distension.      Palpations: Abdomen is soft.      Tenderness: There is abdominal tenderness. There is no guarding or rebound.   Musculoskeletal:         General: Normal range of motion.      Cervical back: Normal range of motion.   Skin:     General: Skin is warm and dry.      " Capillary Refill: Capillary refill takes less than 2 seconds.   Neurological:      General: No focal deficit present.      Mental Status: She is alert and oriented to person, place, and time.   Psychiatric:         Mood and Affect: Mood normal.         Behavior: Behavior normal.         Thought Content: Thought content normal.         Judgment: Judgment normal.             Procedures    ED Course:    ED Course as of 10/02/24 1329   Wed Oct 02, 2024   1104 I have reviewed the mid-level provider(s) note and verbally discussed the case/plan of care.  I was available for consultation as needed at all times during the patient's visit in the Emergency Department.  I agree with the clinical impression, plan, and disposition unless otherwise stated in the MDM below.    ATTENDING ATTESTATION  HPI: 54-year-old female presents with nausea, vomiting, diarrhea. Symptoms are worsened by oral intake. Postop hiatal hernia repair and nisin fundoplication on 9/10/2024 at [facility].    MDM: ED workup reviewed.    I have reviewed the labs results.  CBC, CMP, lipase, troponin clinically unremarkable.    Radiology reports reviewed.     CT Abdomen Pelvis With Contrast    Result Date: 10/2/2024  Extraluminal contrast most consistent with postoperative leak as described.    CTDI: 4.6 mGy DLP:345.81 mGy.cm      CT Chest With Contrast Diagnostic    Result Date: 10/2/2024  Postoperative change from recent fundoplication with a small amount of oral contrast concerning for extravasation and postoperative leak as described.  Fatty liver.  CTDI: 4.6 mGy DLP:345.81 mGy.cm      I recommended consultation with patient's surgeon for likely transfer for higher level of care. [JS]   1121 Hemoglobin: 14.4 [JR]   1121 Hematocrit: 42.4 [JR]   1145 Creatinine: 0.75 [JR]   1145 eGFR: 94.2 [JR]      ED Course User Index  [JR] Shahram Ponce PA-C  [JS] Chuck Knutson DO       Lab Results (last 24 hours)       Procedure Component Value Units Date/Time    Lipase  [[id number]]  (Normal) Collected: 10/02/24 1103    Specimen: Blood Updated: 10/02/24 1125     Lipase 17 U/L     Comprehensive Metabolic Panel [[id number]]  (Abnormal) Collected: 10/02/24 1103    Specimen: Blood Updated: 10/02/24 1125     Glucose 88 mg/dL      BUN 19 mg/dL      Creatinine 0.75 mg/dL      Sodium 141 mmol/L      Potassium 3.5 mmol/L      Chloride 101 mmol/L      CO2 26.7 mmol/L      Calcium 9.5 mg/dL      Total Protein 7.5 g/dL      Albumin 4.5 g/dL      ALT (SGPT) 21 U/L      AST (SGOT) 24 U/L      Alkaline Phosphatase 109 U/L      Total Bilirubin 0.6 mg/dL      Globulin 3.0 gm/dL      A/G Ratio 1.5 g/dL      BUN/Creatinine Ratio 25.3     Anion Gap 13.3 mmol/L      eGFR 94.2 mL/min/1.73     Narrative:      GFR Normal >60  Chronic Kidney Disease <60  Kidney Failure <15      CBC & Differential [[id number]]  (Normal) Collected: 10/02/24 1103    Specimen: Blood Updated: 10/02/24 1119    Narrative:      The following orders were created for panel order CBC & Differential.  Procedure                               Abnormality         Status                     ---------                               -----------         ------                     CBC Auto Differential[[id number]]        Normal              Final result                 Please view results for these tests on the individual orders.    CBC Auto Differential [[id number]]  (Normal) Collected: 10/02/24 1103    Specimen: Blood Updated: 10/02/24 1119     WBC 8.19 10*3/mm3      RBC 5.01 10*6/mm3      Hemoglobin 14.4 g/dL      Hematocrit 42.4 %      MCV 84.6 fL      MCH 28.7 pg      MCHC 34.0 g/dL      RDW 13.1 %      RDW-SD 40.0 fl      MPV 11.7 fL      Platelets 199 10*3/mm3      Neutrophil % 68.1 %      Lymphocyte % 24.3 %      Monocyte % 6.0 %      Eosinophil % 0.9 %      Basophil % 0.6 %      Immature Grans % 0.1 %      Neutrophils, Absolute 5.58 10*3/mm3      Lymphocytes, Absolute 1.99 10*3/mm3      Monocytes, Absolute 0.49 10*3/mm3      Eosinophils,  Absolute 0.07 10*3/mm3      Basophils, Absolute 0.05 10*3/mm3      Immature Grans, Absolute 0.01 10*3/mm3      nRBC 0.0 /100 WBC     Single High Sensitivity Troponin T [[id number]]  (Normal) Collected: 10/02/24 1103    Specimen: Blood Updated: 10/02/24 1126     HS Troponin T <6 ng/L     Narrative:      High Sensitive Troponin T Reference Range:  <14.0 ng/L- Negative Female for AMI  <22.0 ng/L- Negative Male for AMI  >=14 - Abnormal Female indicating possible myocardial injury.  >=22 - Abnormal Male indicating possible myocardial injury.   Clinicians would have to utilize clinical acumen, EKG, Troponin, and serial changes to determine if it is an Acute Myocardial Infarction or myocardial injury due to an underlying chronic condition.                  CT Chest With Contrast Diagnostic    Result Date: 10/2/2024  PROCEDURE: CT CHEST W CONTRAST DIAGNOSTIC-  HISTORY: Recent fundiplication, difficulty swallowing rule out post op complication  COMPARISON: 08/30/2023.  PROCEDURE: The patient was injected with IV contrast.  Axial images were obtained from the lung apex to the mid abdomen by computed tomography. Patient ingested several sips of Gastrografin prior to the exam. This study was performed with techniques to keep radiation doses as low as reasonably achievable, (ALARA). Individualized dose reduction techniques using automated exposure control or adjustment of mA and/or kV according to the patient size were employed.  FINDINGS:  CHEST: There is no suspicious axillary adenopathy. Left pacemaker noted. There is no suspicious hilar or mediastinal adenopathy.  The heart is proper size. There is no pericardial or pleural effusion. 4 mm right lower lobe noncalcified nodule is stable from prior. Limited images of the upper abdomen demonstrate oral contrast throughout the entire esophagus. There is a small amount of contrast in the fundus of the stomach. Deformity from fundoplication noted with multiple surgical clips in the  left upper quadrant. There is a curvilinear area of high attenuation anterior to the descending thoracic aorta and posterior to the fundus of the stomach best seen series 2 image 72 and series 4 image 26, as well as series 5 images, 28 through 30. This is appears to be posterior to the fundus and finding is concerning for extravasation of contrast. This is located just below the diaphragm. Findings most consistent with a postoperative leak. Fatty infiltration of the liver noted.      Impression: Postoperative change from recent fundoplication with a small amount of oral contrast concerning for extravasation and postoperative leak as described.  Fatty liver.  CTDI: 4.6 mGy DLP:345.81 mGy.cm  This report was signed and finalized on 10/2/2024 12:44 PM by Olya Gautam MD.      CT Abdomen Pelvis With Contrast    Result Date: 10/2/2024  PROCEDURE: CT ABDOMEN PELVIS W CONTRAST-  HISTORY: Recent fundiplication, difficulty swallowing rule out post op complication  COMPARISON: CT chest 08/30/2023..  PROCEDURE: The patient was injected with IV contrast. Patient was given a few sips of Gastrografin just prior to the exam. Axial images were obtained from the lung bases to the pubic symphysis by computed tomography. This study was performed with techniques to keep radiation doses as low as reasonably achievable, (ALARA). Individualized dose reduction techniques using automated exposure control or adjustment of mA and/or kV according to the patient size were employed.  FINDINGS:  ABDOMEN: The lung bases are clear. The heart is proper size. The liver demonstrates diffuse fatty infiltration. There are metallic surgical clips in the right upper quadrant consistent with previous cholecystectomy. The spleen is unremarkable. No adrenal mass is present.  The pancreas is unremarkable. The kidneys are unremarkable except for bilateral circumscribed hypodense renal cysts. The aorta is proper caliber. There is no free fluid or adenopathy.  Postsurgical change from recent fundoplication noted with multiple metallic surgical clips in the left upper quadrant. There is a small amount of high attenuation material located posterior to the gastric fundus consistent with extravasation of contrast. This was not present on the prior exam.  PELVIS: The GI tract demonstrates no obstruction.  The appendix is is identified and appears normal. The urinary bladder is partially filled and appears normal. Uterus is surgically absent. There is no free fluid, adenopathy, or inflammatory process.      Impression: Extraluminal contrast most consistent with postoperative leak as described.    CTDI: 4.6 mGy DLP:345.81 mGy.cm  This report was signed and finalized on 10/2/2024 12:44 PM by Olya Gautam MD.          MDM     Amount and/or Complexity of Data Reviewed  Clinical lab tests: reviewed  Tests in the radiology section of CPT®: reviewed  Tests in the medicine section of CPT®: reviewed        Initial impression of presenting illness: This is a 54-year-old female status post paraesophageal hernia repair and fundoplication and EGD on 9-10-24 by Dr. Cornell at [facility] in [place] presenting today for evaluation of weakness, near syncope, difficulty tolerating p.o.    DDX: includes but is not limited to: Postop complication, esophageal perforation, electrolyte imbalance, acute kidney injury, dehydration, malnutrition, dysphagia, GERD, among others    Patient arrives hemodynamically stable afebrile nontachycardic nontachypneic nonhypoxic with vitals interpreted by myself.     Pertinent features from physical exam: Patient has dry mucous membranes.  Generalized weakness.  Alert and oriented x 4, abdomen soft, minimal tenderness to palpation in the epigastric region, nonperitonitic, incisions appear well-healing and without surrounding erythema on the abdomen..    Initial diagnostic plan: CBC CMP lipase troponin EKG CT chest and abdomen pelvis with IV and oral contrast.    Results  from initial plan were reviewed and interpreted by me revealing CBC unremarkable, CMP unremarkable, troponin normal, lipase normal.  CT chest with contrast oral per radiology IMPRESSION:  Postoperative change from recent fundoplication with a small  amount of oral contrast concerning for extravasation and postoperative  leak as described.     Fatty liver.    CT abdomen pelvis with contrast per radiology  IMPRESSION:  Extraluminal contrast most consistent with postoperative  leak as described.    Diagnostic information from other sources: Record reviewed  Stable for transfer.  Interventions / Re-evaluation: 1 L of normal saline, Zofran, 40 of IV Protonix.    Results/clinical rationale were discussed with patient at bedside.  She was agreeable to transfer for further evaluation by surgery and hospitalist.    Consultations/Discussion of results with other physicians: Discussed with ED attending physician.  Spoke with Dr. Cornell, general surgeon who performed the surgery, thinks that the contrast seen on imaging is likely pledgetts from the surgery and does not feel that this is a leak.  However, he recommended transfer to their facility for further evaluation of her dysphagia and consideration of PEG tube and further workup.  I then discussed with hospitalist, Dr. Boyd who was agreeable to accept the patient to their facility.    Disposition plan: Transfer to [facility].  -----    Final diagnoses:   Dysphagia, unspecified type   Status post laparoscopic fundoplication   Inadequate oral nutritional intake          Shahram Ponce PA-C  10/02/24 5183

## 2024-10-03 ENCOUNTER — ANESTHESIA EVENT (OUTPATIENT)
Dept: GASTROENTEROLOGY | Facility: HOSPITAL | Age: 54
End: 2024-10-03
Payer: MEDICARE

## 2024-10-03 ENCOUNTER — ANESTHESIA (OUTPATIENT)
Dept: GASTROENTEROLOGY | Facility: HOSPITAL | Age: 54
End: 2024-10-03
Payer: MEDICARE

## 2024-10-03 PROBLEM — E43 SEVERE MALNUTRITION: Status: ACTIVE | Noted: 2024-10-03

## 2024-10-03 LAB
ANION GAP SERPL CALCULATED.3IONS-SCNC: 8 MMOL/L (ref 5–15)
BASOPHILS # BLD AUTO: 0.03 10*3/MM3 (ref 0–0.2)
BASOPHILS NFR BLD AUTO: 0.5 % (ref 0–1.5)
BUN SERPL-MCNC: 9 MG/DL (ref 6–20)
BUN/CREAT SERPL: 14.3 (ref 7–25)
CALCIUM SPEC-SCNC: 9.1 MG/DL (ref 8.6–10.5)
CHLORIDE SERPL-SCNC: 105 MMOL/L (ref 98–107)
CO2 SERPL-SCNC: 26 MMOL/L (ref 22–29)
CREAT SERPL-MCNC: 0.63 MG/DL (ref 0.57–1)
DEPRECATED RDW RBC AUTO: 39.1 FL (ref 37–54)
EGFRCR SERPLBLD CKD-EPI 2021: 104.9 ML/MIN/1.73
EOSINOPHIL # BLD AUTO: 0.16 10*3/MM3 (ref 0–0.4)
EOSINOPHIL NFR BLD AUTO: 2.5 % (ref 0.3–6.2)
ERYTHROCYTE [DISTWIDTH] IN BLOOD BY AUTOMATED COUNT: 12.7 % (ref 12.3–15.4)
GLUCOSE SERPL-MCNC: 108 MG/DL (ref 65–99)
HCT VFR BLD AUTO: 37.7 % (ref 34–46.6)
HGB BLD-MCNC: 12.5 G/DL (ref 12–15.9)
IMM GRANULOCYTES # BLD AUTO: 0.01 10*3/MM3 (ref 0–0.05)
IMM GRANULOCYTES NFR BLD AUTO: 0.2 % (ref 0–0.5)
LYMPHOCYTES # BLD AUTO: 2.18 10*3/MM3 (ref 0.7–3.1)
LYMPHOCYTES NFR BLD AUTO: 34.3 % (ref 19.6–45.3)
MCH RBC QN AUTO: 28.3 PG (ref 26.6–33)
MCHC RBC AUTO-ENTMCNC: 33.2 G/DL (ref 31.5–35.7)
MCV RBC AUTO: 85.3 FL (ref 79–97)
MONOCYTES # BLD AUTO: 0.46 10*3/MM3 (ref 0.1–0.9)
MONOCYTES NFR BLD AUTO: 7.2 % (ref 5–12)
NEUTROPHILS NFR BLD AUTO: 3.52 10*3/MM3 (ref 1.7–7)
NEUTROPHILS NFR BLD AUTO: 55.3 % (ref 42.7–76)
NRBC BLD AUTO-RTO: 0 /100 WBC (ref 0–0.2)
PLATELET # BLD AUTO: 162 10*3/MM3 (ref 140–450)
PMV BLD AUTO: 11.3 FL (ref 6–12)
POTASSIUM SERPL-SCNC: 4.4 MMOL/L (ref 3.5–5.2)
POTASSIUM SERPL-SCNC: 4.4 MMOL/L (ref 3.5–5.2)
RBC # BLD AUTO: 4.42 10*6/MM3 (ref 3.77–5.28)
SODIUM SERPL-SCNC: 139 MMOL/L (ref 136–145)
WBC NRBC COR # BLD AUTO: 6.36 10*3/MM3 (ref 3.4–10.8)

## 2024-10-03 PROCEDURE — 25810000003 LACTATED RINGERS PER 1000 ML: Performed by: SURGERY

## 2024-10-03 PROCEDURE — 63710000001 ONDANSETRON ODT 4 MG TABLET DISPERSIBLE: Performed by: SURGERY

## 2024-10-03 PROCEDURE — 25810000003 LACTATED RINGERS PER 1000 ML: Performed by: ANESTHESIOLOGY

## 2024-10-03 PROCEDURE — 84132 ASSAY OF SERUM POTASSIUM: CPT | Performed by: SURGERY

## 2024-10-03 PROCEDURE — G0378 HOSPITAL OBSERVATION PER HR: HCPCS

## 2024-10-03 PROCEDURE — 80048 BASIC METABOLIC PNL TOTAL CA: CPT | Performed by: SURGERY

## 2024-10-03 PROCEDURE — 25010000002 CEFAZOLIN PER 500 MG: Performed by: NURSE ANESTHETIST, CERTIFIED REGISTERED

## 2024-10-03 PROCEDURE — 25010000002 PROPOFOL 10 MG/ML EMULSION: Performed by: NURSE ANESTHETIST, CERTIFIED REGISTERED

## 2024-10-03 PROCEDURE — 85025 COMPLETE CBC W/AUTO DIFF WBC: CPT | Performed by: SURGERY

## 2024-10-03 PROCEDURE — 25010000002 LIDOCAINE PF 1% 1 % SOLUTION: Performed by: NURSE ANESTHETIST, CERTIFIED REGISTERED

## 2024-10-03 RX ORDER — ONDANSETRON 2 MG/ML
4 INJECTION INTRAMUSCULAR; INTRAVENOUS ONCE AS NEEDED
Status: DISCONTINUED | OUTPATIENT
Start: 2024-10-03 | End: 2024-10-03 | Stop reason: HOSPADM

## 2024-10-03 RX ORDER — LIDOCAINE HYDROCHLORIDE 10 MG/ML
INJECTION, SOLUTION EPIDURAL; INFILTRATION; INTRACAUDAL; PERINEURAL AS NEEDED
Status: DISCONTINUED | OUTPATIENT
Start: 2024-10-03 | End: 2024-10-03 | Stop reason: SURG

## 2024-10-03 RX ORDER — SODIUM CHLORIDE, SODIUM LACTATE, POTASSIUM CHLORIDE, CALCIUM CHLORIDE 600; 310; 30; 20 MG/100ML; MG/100ML; MG/100ML; MG/100ML
20 INJECTION, SOLUTION INTRAVENOUS CONTINUOUS
Status: DISCONTINUED | OUTPATIENT
Start: 2024-10-03 | End: 2024-10-04 | Stop reason: HOSPADM

## 2024-10-03 RX ORDER — CEFAZOLIN SODIUM 1 G/3ML
INJECTION, POWDER, FOR SOLUTION INTRAMUSCULAR; INTRAVENOUS AS NEEDED
Status: DISCONTINUED | OUTPATIENT
Start: 2024-10-03 | End: 2024-10-03 | Stop reason: SURG

## 2024-10-03 RX ORDER — PROMETHAZINE HYDROCHLORIDE 6.25 MG/5ML
12.5 SYRUP ORAL EVERY 6 HOURS PRN
Status: DISCONTINUED | OUTPATIENT
Start: 2024-10-03 | End: 2024-10-04 | Stop reason: HOSPADM

## 2024-10-03 RX ORDER — HYDROCODONE BITARTRATE AND ACETAMINOPHEN 7.5; 325 MG/15ML; MG/15ML
15 SOLUTION ORAL EVERY 4 HOURS PRN
Status: DISCONTINUED | OUTPATIENT
Start: 2024-10-03 | End: 2024-10-03 | Stop reason: SDUPTHER

## 2024-10-03 RX ORDER — PROPOFOL 10 MG/ML
VIAL (ML) INTRAVENOUS AS NEEDED
Status: DISCONTINUED | OUTPATIENT
Start: 2024-10-03 | End: 2024-10-03 | Stop reason: SURG

## 2024-10-03 RX ORDER — IPRATROPIUM BROMIDE AND ALBUTEROL SULFATE 2.5; .5 MG/3ML; MG/3ML
3 SOLUTION RESPIRATORY (INHALATION) ONCE AS NEEDED
Status: DISCONTINUED | OUTPATIENT
Start: 2024-10-03 | End: 2024-10-03 | Stop reason: HOSPADM

## 2024-10-03 RX ORDER — DOCUSATE SODIUM 50 MG/5 ML
100 LIQUID (ML) ORAL DAILY
Status: DISCONTINUED | OUTPATIENT
Start: 2024-10-03 | End: 2024-10-04 | Stop reason: HOSPADM

## 2024-10-03 RX ADMIN — HYDROCODONE BITARTRATE AND ACETAMINOPHEN 15 ML: 7.5; 325 SOLUTION ORAL at 16:26

## 2024-10-03 RX ADMIN — SODIUM CHLORIDE, POTASSIUM CHLORIDE, SODIUM LACTATE AND CALCIUM CHLORIDE 20 ML/HR: 600; 310; 30; 20 INJECTION, SOLUTION INTRAVENOUS at 14:33

## 2024-10-03 RX ADMIN — FLECAINIDE ACETATE 75 MG: 50 TABLET ORAL at 19:58

## 2024-10-03 RX ADMIN — HYDROCODONE BITARTRATE AND ACETAMINOPHEN 15 ML: 7.5; 325 SOLUTION ORAL at 08:26

## 2024-10-03 RX ADMIN — ONDANSETRON 4 MG: 4 TABLET, ORALLY DISINTEGRATING ORAL at 21:53

## 2024-10-03 RX ADMIN — PROPOFOL INJECTABLE EMULSION 50 MG: 10 INJECTION, EMULSION INTRAVENOUS at 15:31

## 2024-10-03 RX ADMIN — PROPOFOL INJECTABLE EMULSION 135 MCG/KG/MIN: 10 INJECTION, EMULSION INTRAVENOUS at 15:28

## 2024-10-03 RX ADMIN — HYDROCODONE BITARTRATE AND ACETAMINOPHEN 15 ML: 7.5; 325 SOLUTION ORAL at 19:58

## 2024-10-03 RX ADMIN — Medication 10 ML: at 08:27

## 2024-10-03 RX ADMIN — PROPOFOL INJECTABLE EMULSION 150 MG: 10 INJECTION, EMULSION INTRAVENOUS at 15:27

## 2024-10-03 RX ADMIN — CEFAZOLIN SODIUM 2 G: 1 INJECTION, POWDER, FOR SOLUTION INTRAMUSCULAR; INTRAVENOUS at 15:28

## 2024-10-03 RX ADMIN — LIDOCAINE HYDROCHLORIDE 100 MG: 10 INJECTION, SOLUTION EPIDURAL; INFILTRATION; INTRACAUDAL; PERINEURAL at 15:27

## 2024-10-03 NOTE — CONSULTS
Malnutrition Severity Assessment    Patient Name:  Nely Castrejon  YOB: 1970  MRN: [id number]  Admit Date:  10/2/2024    Patient meets criteria for : Severe Malnutrition    Comments:  Pt meets criteria for severe, acute malnutrition with the indicators of </=50% of EEN x >/=5d and significant weight loss of 6.6% x 1 month . Of note, pt s/p fundoplication with inability to tolerate PO diet when advanced.      Malnutrition Severity Assessment  Malnutrition Type: Acute Disease or Injury - Related Malnutrition  Malnutrition Type (Last 8 Hours)       Malnutrition Severity Assessment       Row Name 10/03/24 1409       Malnutrition Severity Assessment    Malnutrition Type Acute Disease or Injury - Related Malnutrition      Row Name 10/03/24 1409       Insufficient Energy Intake     Insufficient Energy Intake Findings Severe    Insufficient Energy Intake  < or equal to 50% of est. energy requirement for > or equal to 5d)      Row Name 10/03/24 1409       Unintentional Weight Loss     Unintentional Weight Loss Findings Severe    Unintentional Weight Loss  Weight loss greater than 5% in one month  6.6% x 1month      Row Name 10/03/24 1409       Criteria Met (Must meet criteria for severity in at least 2 of these categories: M Wasting, Fat Loss, Fluid, Secondary Signs, Wt. Status, Intake)    Patient meets criteria for  Severe Malnutrition                    Electronically signed by:  Shanna Pichardo MS,RD,LD  10/03/24 14:09 EDT

## 2024-10-03 NOTE — CASE MANAGEMENT/SOCIAL WORK
Discharge Planning Assessment  [facility]     Patient Name: Nely Castrejon  MRN: [id number]  Today's Date: 10/3/2024    Admit Date: 10/2/2024    Plan: Home with family   Discharge Needs Assessment       Row Name 10/03/24 1007       Living Environment    People in Home spouse;child(brenda), dependent    Current Living Arrangements home    Potentially Unsafe Housing Conditions none    In the past 12 months has the electric, gas, oil, or water company threatened to shut off services in your home? No    Primary Care Provided by self    Provides Primary Care For grandchild(brenda)  (5) grandchildren ages 9-17    Family Caregiver if Needed spouse    Quality of Family Relationships unable to assess    Able to Return to Prior Arrangements yes       Resource/Environmental Concerns    Resource/Environmental Concerns none    Transportation Concerns none       Transportation Needs    In the past 12 months, has lack of transportation kept you from medical appointments or from getting medications? no    In the past 12 months, has lack of transportation kept you from meetings, work, or from getting things needed for daily living? No       Food Insecurity    Within the past 12 months, you worried that your food would run out before you got the money to buy more. Never true    Within the past 12 months, the food you bought just didn't last and you didn't have money to get more. Never true       Transition Planning    Patient/Family Anticipates Transition to home with family    Patient/Family Anticipated Services at Transition     Transportation Anticipated family or friend will provide       Discharge Needs Assessment    Readmission Within the Last 30 Days no previous admission in last 30 days    Equipment Currently Used at Home none    Concerns to be Addressed denies needs/concerns at this time    Anticipated Changes Related to Illness none    Equipment Needed After Discharge none                   Discharge Plan       Vas[place]  Name 10/03/24 1008       Plan    Plan Home with family    Patient/Family in Agreement with Plan yes    Plan Comments CM spoke to patient at bedside to initiate discharge planning. Patient lives at home with her  and (5) grandchildren, ages 9-17. She is independent with ADL's. She denies using any DME or home oxygen. She is not current with home health or outpatient therapy services. Her PCP is Jie Mayo. Verified [place] [employer] & Medicaid of [place]. Her plan is home with her family. Spouse will transport per patient. CM will continue to follow.    Final Discharge Disposition Code 01 - home or self-care                  Continued Care and Services - Admitted Since 10/2/2024    No active coordination exists for this encounter.          Demographic Summary       Row Name 10/03/24 1007       General Information    Admission Type observation    Arrived From emergency department    Referral Source admission list    Reason for Consult discharge planning    Preferred Language English                   Functional Status       Row Name 10/03/24 1007       Functional Status    Usual Activity Tolerance good    Current Activity Tolerance good       Functional Status, IADL    Medications independent    Meal Preparation independent    Housekeeping independent    Laundry independent    Shopping independent                   Psychosocial    No documentation.                  Abuse/Neglect    No documentation.                  Legal    No documentation.                  Substance Abuse    No documentation.                  Patient Forms    No documentation.                     Kassy Haque, RN

## 2024-10-03 NOTE — PLAN OF CARE
Problem: Adult Inpatient Plan of Care  Goal: Plan of Care Review  Outcome: Progressing  Goal: Patient-Specific Goal (Individualized)  Outcome: Progressing  Goal: Absence of Hospital-Acquired Illness or Injury  Outcome: Progressing  Intervention: Identify and Manage Fall Risk  Recent Flowsheet Documentation  Taken 10/3/2024 1400 by Clarice Looney RN  Safety Promotion/Fall Prevention: patient off unit  Taken 10/3/2024 1200 by Clarice Looney RN  Safety Promotion/Fall Prevention:   activity supervised   assistive device/personal items within reach   clutter free environment maintained   toileting scheduled   safety round/check completed   room organization consistent  Taken 10/3/2024 1000 by Clarice Looney RN  Safety Promotion/Fall Prevention:   activity supervised   assistive device/personal items within reach   clutter free environment maintained   toileting scheduled   safety round/check completed   room organization consistent  Taken 10/3/2024 0800 by Clarice Looney RN  Safety Promotion/Fall Prevention:   activity supervised   assistive device/personal items within reach   clutter free environment maintained   toileting scheduled   safety round/check completed   room organization consistent  Intervention: Prevent Skin Injury  Recent Flowsheet Documentation  Taken 10/3/2024 1200 by Clarice Looney RN  Body Position: position changed independently  Skin Protection:   adhesive use limited   tubing/devices free from skin contact   transparent dressing maintained   skin-to-skin areas padded   skin-to-device areas padded  Taken 10/3/2024 1000 by Clarice Looney RN  Body Position: position changed independently  Skin Protection:   adhesive use limited   transparent dressing maintained   tubing/devices free from skin contact   skin-to-skin areas padded   skin-to-device areas padded  Taken 10/3/2024 0800 by Clarice Looney RN  Body Position: position changed independently  Skin Protection:   adhesive use  limited   tubing/devices free from skin contact   transparent dressing maintained   skin-to-skin areas padded   skin-to-device areas padded  Intervention: Prevent and Manage VTE (Venous Thromboembolism) Risk  Recent Flowsheet Documentation  Taken 10/3/2024 1200 by Clarice Looney RN  Activity Management: activity encouraged  Taken 10/3/2024 1000 by Clarice Looney RN  Activity Management: activity encouraged  Taken 10/3/2024 0800 by Clarice Looney RN  Activity Management: activity encouraged  Goal: Optimal Comfort and Wellbeing  Outcome: Progressing  Intervention: Monitor Pain and Promote Comfort  Recent Flowsheet Documentation  Taken 10/3/2024 0800 by Clarice Looney RN  Pain Management Interventions: see MAR  Intervention: Provide Person-Centered Care  Recent Flowsheet Documentation  Taken 10/3/2024 0800 by Clarice Looney RN  Trust Relationship/Rapport: care explained  Goal: Readiness for Transition of Care  Outcome: Progressing     Problem: Hypertension Comorbidity  Goal: Blood Pressure in Desired Range  Outcome: Progressing   Goal Outcome Evaluation:

## 2024-10-03 NOTE — OP NOTE
"Operative Report    Patient Name:  Nely Castrejon  YOB: 1970  [id number]    10/3/2024        PREOPERATIVE DIAGNOSIS:  Feeding difficulty (R63.39)      POSTOPERATIVE DIAGNOSIS: Same      PROCEDURE PERFORMED: EGD with PEG placement (41444)      SURGEON: Eduar Cornell MD      ASSISTANT:        ANESTHESIA:  MAC      SPECIMENS: None      FINDINGS: 20 Mohawk PEG tube placed at the 4 cm level      INDICATIONS:       The patient is a 54 y.o. female with a history of recent partial fundoplication who has postoperative dysphagia.  She is able to take some liquids but has difficulty with higher volumes.  To manage this we discussed the possibility of PEG placement to assist in her nutritional support as her postoperative swelling improves.  The risks and benefits of EGD with PEG placement were discussed at length with the patient and the patient's family and they agreed to proceed.      DESCRIPTION OF PROCEDURE:     After obtaining informed consent, the patient was taken to the endoscopy suite. They were placed in modified Waller position, and after appropriate sedation as detailed above, a bite block was placed into the patient's mouth. The endoscope was advanced into the mouth and into the esophagus without difficulty.  At the level of the GE junction the fundoplication was then placed and could be easily traversed with the scope.  It was advanced into the stomach, and into the duodenum, which was normal  . The scope was then returned to the stomach, and the stomach was maximally insufflated.  A retroflexed view the GE junction demonstrated an excellent \"stack of coins\" appearance without obvious breakdown or obstruction at the level of the fundoplication.  An appropriate site for PEG placement was then determined by palpation, transillumination, and the \"safe track\" needle technique. This area was prepped and draped in standard sterile fashion, and after infiltrating the skin with local anesthetic, a " 7mm skin incision was made in this location. A needle was advanced through this incision and into the stomach under endoscopic guidance. A wire was then placed through the needle, grasped with the endoscope, and brought out through the mouth. At this point, using the Ponsky Pull technique, a 20 Guinean PEG tube was brought through the abdominal wall in this location. The endoscope was again advanced through the mouth and esophagus and into the stomach, where the PEG bumper could be seen abutting the anterior gastric wall in standard fashion without bleeding. The endoscope was then used to desufflate the stomach, and removed from the patient's mouth without difficulty. The PEG tube was secured at the 4 cm level, with the bumper at the 4.5 cm level. It was sutured to the skin, dressed in standard sterile fashion, and placed to gravity drainage. The patient tolerated the procedure well. They were then transported to the recovery room in stable condition. All sponge and needle counts were correct times two at the completion of the case. There were no immediate complications.           Eduar Cornell MD  10/3/2024  15:41 EDT

## 2024-10-03 NOTE — PROGRESS NOTES
I have reviewed discharge instructions with the patient. The patient verbalized understanding. Patient left ED via Discharge Method: ambulatory to Home with self. Opportunity for questions and clarification provided. Patient given 2 scripts. Patient Name:  Nely Castrejon  YOB: 1970  [id number]    Surgery Progress Note    Date of visit: 10/3/2024    Subjective   Subjective: Feels OK, tolerating liquids last night.         Objective     Objective:     /80 (BP Location: Left arm, Patient Position: Lying)   Pulse 79   Temp 97 °F (36.1 °C) (Oral)   Resp 18   Wt 57.8 kg (127 lb 6.4 oz)   SpO2 97%   BMI 22.57 kg/m²   No intake or output data in the 24 hours ending 10/03/24 0733    CV:  Rhythm  regular and rate regular   L:  Clear  to auscultation bilaterally   Abd:  Bowel sounds positive , soft, nontender. Incisions c/d/i  Ext:  No cyanosis, clubbing, edema    Recent labs that are back at this time have been reviewed.  CT scan was reviewed again with Dr. Stern of radiology, who also agrees that there is no evidence of leak but rather these findings are of the pledgets placed at the time of surgery.           Assessment/ Plan:    Dysphagia- Some improvement.  I think the best course of action would be for EGD with PEG placement today.  This would allow us additional routes of nutritional support as we wait for her postoperative swelling to improve.  I have discussed this with the patient who agrees to proceed.  Hopefully we will proceed later today.        Active Hospital Problems    Diagnosis  POA    **Dysphagia [R13.10]  Yes      Resolved Hospital Problems   No resolved problems to display.              Eduar Cornell MD  10/3/2024  07:33 EDT

## 2024-10-03 NOTE — BRIEF OP NOTE
ESOPHAGOGASTRODUODENOSCOPY WITH PERCUTANEOUS ENDOSCOPIC GASTROSTOMY TUBE INSERTION  Progress Note    Nely Castrejon  10/3/2024    Pre-op Diagnosis:   Dysphagia       Post-Op Diagnosis Codes:  Same    Procedure/CPT® Codes:        Procedure(s):  ESOPHAGOGASTRODUODENOSCOPY WITH PERCUTANEOUS ENDOSCOPIC GASTROSTOMY TUBE INSERTION              Surgeon(s):  Eduar Cornell MD    Anesthesia: Monitored Anesthesia Care    Staff:   Circulator: Almita Harden RN  Endo Nurse: Shilpi Angulo RN         Estimated Blood Loss: minimal    Urine Voided: * No values recorded between 10/3/2024  3:22 PM and 10/3/2024  3:34 PM *    Specimens:                None          Drains:   Gastrostomy/Enterostomy Percutaneous endoscopic gastrostomy (PEG) 20 Fr. LUQ (Active)   Surrounding Skin Dry;Intact 10/03/24 1533   Securement sutured to abdomen;taped to abdomen 10/03/24 1533   Drain Status Open to gravity drainage 10/03/24 1533   Catheter Position (cm marking) 4 cm 10/03/24 1533   Gastrostomy/Enterostomy Site Interventions Site assessed 10/03/24 1533   Dressing Status Clean;Dry;Intact 10/03/24 1533       Findings: 20 Icelandic PEG tube placed with the skin at the 4 cm level        Complications: None          Eduar Cornell MD     Date: 10/3/2024  Time: 15:40 EDT

## 2024-10-03 NOTE — ANESTHESIA PREPROCEDURE EVALUATION
Anesthesia Evaluation     Patient summary reviewed and Nursing notes reviewed   NPO Solid Status: > 8 hours  NPO Liquid Status: > 2 hours           Airway   Mallampati: II  TM distance: >3 FB  Neck ROM: full  No difficulty expected  Dental      Pulmonary    (-) shortness of breath, recent URI, sleep apnea, not a smoker  Cardiovascular     (+) pacemaker (6plus years [employer] early sept   SSS) pacemaker, hypertension, dysrhythmias Atrial Fib, hyperlipidemia  (-) past MI, angina, cardiac stents      Neuro/Psych  (+) headaches  (-) seizures, CVA  GI/Hepatic/Renal/Endo    (+) hiatal hernia, GERD    Musculoskeletal     (+) chronic pain, myalgias  Abdominal    Substance History      OB/GYN          Other   arthritis,         Phys Exam Other: Rick 2 D G!V   several severe BP drops under GA for [place] 9/24                   Anesthesia Plan    ASA 3     general and MAC     (PFL )  intravenous induction     Anesthetic plan, risks, benefits, and alternatives have been provided, discussed and informed consent has been obtained with: patient.    Plan discussed with CRNA.        CODE STATUS:

## 2024-10-03 NOTE — ANESTHESIA POSTPROCEDURE EVALUATION
Patient: Nely Castrejon    Procedure Summary       Date: 10/03/24 Room / Location:  [facility]    Anesthesia Start: 1522 Anesthesia Stop: 1545    Procedure: ESOPHAGOGASTRODUODENOSCOPY WITH PERCUTANEOUS ENDOSCOPIC GASTROSTOMY TUBE INSERTION (Esophagus) Diagnosis:     Surgeons: Eduar Cornell MD Provider: Nasim Kan MD    Anesthesia Type: general, MAC ASA Status: 3            Anesthesia Type: general, MAC    Vitals  Vitals Value Taken Time   BP     Temp     Pulse 78 10/03/24 1545   Resp     SpO2 100 % 10/03/24 1545   Vitals shown include unfiled device data.        Post Anesthesia Care and Evaluation    Patient location during evaluation: PACU  Patient participation: waiting for patient participation  Level of consciousness: sleepy but conscious    Airway patency: patent  Anesthetic complications: No anesthetic complications  PONV Status: none  Cardiovascular status: hemodynamically stable and acceptable  Respiratory status: nonlabored ventilation and acceptable  Hydration status: acceptable

## 2024-10-03 NOTE — PROGRESS NOTES
"Patient Name:  Nely Castrejon  YOB: 1970  [id number]    Surgery Post - Operative Note    Date of visit: 10/3/2024    Subjective   Subjective: Complains of some soreness.       Objective     Objective:    /85   Pulse 84   Temp 97.6 °F (36.4 °C) (Temporal)   Resp 18   Ht 160 cm (62.99\")   Wt 57.8 kg (127 lb 6.8 oz)   SpO2 99%   BMI 22.58 kg/m²     CV:  Rate  regular and rhythm  regular  L:  Clear  to auscultation bilaterally   ABD:  Soft, appropriately tender. PEG clean, dry and intact   EXT:  No cyanosis, clubbing or edema             Assessment/ Plan: Doing well after PEG. Continue Pulmonary toilet    Problem List Items Addressed This Visit    None       Active Hospital Problems    Diagnosis  POA    **Dysphagia [R13.10]  Yes    Severe malnutrition [E43]  Yes      Resolved Hospital Problems   No resolved problems to display.            Eduar Cornell MD  10/3/2024  19:22 EDT    "

## 2024-10-04 ENCOUNTER — READMISSION MANAGEMENT (OUTPATIENT)
Dept: CALL CENTER | Facility: HOSPITAL | Age: 54
End: 2024-10-04
Payer: MEDICARE

## 2024-10-04 VITALS
SYSTOLIC BLOOD PRESSURE: 111 MMHG | TEMPERATURE: 97.5 F | HEIGHT: 63 IN | DIASTOLIC BLOOD PRESSURE: 68 MMHG | HEART RATE: 75 BPM | WEIGHT: 127.43 LBS | BODY MASS INDEX: 22.58 KG/M2 | OXYGEN SATURATION: 97 % | RESPIRATION RATE: 18 BRPM

## 2024-10-04 LAB
ANION GAP SERPL CALCULATED.3IONS-SCNC: 10 MMOL/L (ref 5–15)
BUN SERPL-MCNC: 5 MG/DL (ref 6–20)
BUN/CREAT SERPL: 7.6 (ref 7–25)
CALCIUM SPEC-SCNC: 8.8 MG/DL (ref 8.6–10.5)
CHLORIDE SERPL-SCNC: 102 MMOL/L (ref 98–107)
CO2 SERPL-SCNC: 28 MMOL/L (ref 22–29)
CREAT SERPL-MCNC: 0.66 MG/DL (ref 0.57–1)
DEPRECATED RDW RBC AUTO: 39.2 FL (ref 37–54)
EGFRCR SERPLBLD CKD-EPI 2021: 103.7 ML/MIN/1.73
ERYTHROCYTE [DISTWIDTH] IN BLOOD BY AUTOMATED COUNT: 12.7 % (ref 12.3–15.4)
GLUCOSE SERPL-MCNC: 76 MG/DL (ref 65–99)
HCT VFR BLD AUTO: 36.3 % (ref 34–46.6)
HGB BLD-MCNC: 12 G/DL (ref 12–15.9)
MCH RBC QN AUTO: 28.2 PG (ref 26.6–33)
MCHC RBC AUTO-ENTMCNC: 33.1 G/DL (ref 31.5–35.7)
MCV RBC AUTO: 85.4 FL (ref 79–97)
PLATELET # BLD AUTO: 141 10*3/MM3 (ref 140–450)
PMV BLD AUTO: 11.7 FL (ref 6–12)
POTASSIUM SERPL-SCNC: 3.8 MMOL/L (ref 3.5–5.2)
RBC # BLD AUTO: 4.25 10*6/MM3 (ref 3.77–5.28)
SODIUM SERPL-SCNC: 140 MMOL/L (ref 136–145)
WBC NRBC COR # BLD AUTO: 6.99 10*3/MM3 (ref 3.4–10.8)

## 2024-10-04 PROCEDURE — 80048 BASIC METABOLIC PNL TOTAL CA: CPT | Performed by: SURGERY

## 2024-10-04 PROCEDURE — 63710000001 ONDANSETRON ODT 4 MG TABLET DISPERSIBLE: Performed by: SURGERY

## 2024-10-04 PROCEDURE — G0378 HOSPITAL OBSERVATION PER HR: HCPCS

## 2024-10-04 PROCEDURE — 85027 COMPLETE CBC AUTOMATED: CPT | Performed by: SURGERY

## 2024-10-04 RX ORDER — HYDROCODONE BITARTRATE AND ACETAMINOPHEN 7.5; 325 MG/15ML; MG/15ML
15 SOLUTION ORAL EVERY 4 HOURS PRN
Qty: 165 ML | Refills: 0 | Status: SHIPPED | OUTPATIENT
Start: 2024-10-04

## 2024-10-04 RX ADMIN — HYDROCODONE BITARTRATE AND ACETAMINOPHEN 15 ML: 7.5; 325 SOLUTION ORAL at 04:28

## 2024-10-04 RX ADMIN — CHLORTHALIDONE 12.5 MG: 25 TABLET ORAL at 08:40

## 2024-10-04 RX ADMIN — Medication 10 ML: at 08:42

## 2024-10-04 RX ADMIN — HYDROCODONE BITARTRATE AND ACETAMINOPHEN 15 ML: 7.5; 325 SOLUTION ORAL at 08:45

## 2024-10-04 RX ADMIN — HYDROCODONE BITARTRATE AND ACETAMINOPHEN 15 ML: 7.5; 325 SOLUTION ORAL at 00:22

## 2024-10-04 RX ADMIN — FLECAINIDE ACETATE 75 MG: 50 TABLET ORAL at 08:41

## 2024-10-04 RX ADMIN — ONDANSETRON 4 MG: 4 TABLET, ORALLY DISINTEGRATING ORAL at 09:29

## 2024-10-04 RX ADMIN — PANTOPRAZOLE SODIUM 40 MG: 40 TABLET, DELAYED RELEASE ORAL at 05:32

## 2024-10-04 NOTE — PLAN OF CARE
Problem: Adult Inpatient Plan of Care  Goal: Plan of Care Review  Outcome: Progressing  Goal: Patient-Specific Goal (Individualized)  Outcome: Progressing  Goal: Absence of Hospital-Acquired Illness or Injury  Outcome: Progressing  Intervention: Identify and Manage Fall Risk  Recent Flowsheet Documentation  Taken 10/4/2024 1400 by Clarice Looney RN  Safety Promotion/Fall Prevention:   activity supervised   assistive device/personal items within reach   clutter free environment maintained   toileting scheduled   safety round/check completed   room organization consistent  Taken 10/4/2024 1200 by Clarice Looney RN  Safety Promotion/Fall Prevention:   activity supervised   clutter free environment maintained   assistive device/personal items within reach   toileting scheduled   safety round/check completed   room organization consistent  Taken 10/4/2024 1000 by Clarice Looney RN  Safety Promotion/Fall Prevention:   activity supervised   assistive device/personal items within reach   clutter free environment maintained   toileting scheduled   safety round/check completed   room organization consistent  Taken 10/4/2024 0800 by Clarice Looney RN  Safety Promotion/Fall Prevention:   activity supervised   assistive device/personal items within reach   clutter free environment maintained   toileting scheduled   safety round/check completed   room organization consistent  Intervention: Prevent Skin Injury  Recent Flowsheet Documentation  Taken 10/4/2024 1400 by Clarice Looney RN  Body Position: position changed independently  Skin Protection:   adhesive use limited   tubing/devices free from skin contact   transparent dressing maintained   skin-to-skin areas padded   skin-to-device areas padded  Taken 10/4/2024 1200 by Clarice Looney RN  Body Position: position changed independently  Skin Protection:   adhesive use limited   tubing/devices free from skin contact   transparent dressing maintained    skin-to-skin areas padded   skin-to-device areas padded  Taken 10/4/2024 1000 by Clarice Looney RN  Body Position: position changed independently  Skin Protection:   adhesive use limited   tubing/devices free from skin contact   transparent dressing maintained   skin-to-skin areas padded   skin-to-device areas padded  Taken 10/4/2024 0800 by Clarice Looney RN  Body Position: position changed independently  Skin Protection:   adhesive use limited   tubing/devices free from skin contact   transparent dressing maintained   skin-to-skin areas padded   skin-to-device areas padded  Intervention: Prevent and Manage VTE (Venous Thromboembolism) Risk  Recent Flowsheet Documentation  Taken 10/4/2024 1400 by Clarice Looney RN  Activity Management: activity encouraged  Taken 10/4/2024 1200 by Clarice Looney RN  Activity Management: activity encouraged  Taken 10/4/2024 1000 by Clarice Looney RN  Activity Management: activity encouraged  Taken 10/4/2024 0800 by Clarice Looney RN  Activity Management: activity encouraged  Goal: Optimal Comfort and Wellbeing  Outcome: Progressing  Goal: Readiness for Transition of Care  Outcome: Progressing     Problem: Hypertension Comorbidity  Goal: Blood Pressure in Desired Range  Outcome: Progressing     Problem: Fall Injury Risk  Goal: Absence of Fall and Fall-Related Injury  Outcome: Progressing  Intervention: Promote Injury-Free Environment  Recent Flowsheet Documentation  Taken 10/4/2024 1400 by Clarice Looney RN  Safety Promotion/Fall Prevention:   activity supervised   assistive device/personal items within reach   clutter free environment maintained   toileting scheduled   safety round/check completed   room organization consistent  Taken 10/4/2024 1200 by Clarice Looney RN  Safety Promotion/Fall Prevention:   activity supervised   clutter free environment maintained   assistive device/personal items within reach   toileting scheduled   safety round/check  completed   room organization consistent  Taken 10/4/2024 1000 by Clarice Looney, RN  Safety Promotion/Fall Prevention:   activity supervised   assistive device/personal items within reach   clutter free environment maintained   toileting scheduled   safety round/check completed   room organization consistent  Taken 10/4/2024 0800 by Clarice Looney, RN  Safety Promotion/Fall Prevention:   activity supervised   assistive device/personal items within reach   clutter free environment maintained   toileting scheduled   safety round/check completed   room organization consistent   Goal Outcome Evaluation:

## 2024-10-04 NOTE — PROGRESS NOTES
"Patient Name:  Nely Castrejon  YOB: 1970  [id number]    Surgery Progress Note    Date of visit: 10/4/2024    Subjective   Subjective: Stable after PEG, tolerating liquids.          Objective     Objective:     /70 (BP Location: Left arm, Patient Position: Lying)   Pulse 78   Temp 96.9 °F (36.1 °C) (Oral)   Resp 18   Ht 160 cm (62.99\")   Wt 57.8 kg (127 lb 6.8 oz)   SpO2 98%   BMI 22.58 kg/m²     Intake/Output Summary (Last 24 hours) at 10/4/2024 0741  Last data filed at 10/4/2024 0535  Gross per 24 hour   Intake 820 ml   Output --   Net 820 ml       CV:  Rhythm  regular and rate regular   L:  Clear  to auscultation bilaterally   Abd:  Bowel sounds positive , soft, minimally tender. PEG c/d/i  Ext:  No cyanosis, clubbing, edema    Recent labs that are back at this time have been reviewed.            Assessment/ Plan:    Dysphagia- Stable after PEG. Await nutrition recs re; supplemental feeds and supplying patient with appropriate materials. Hopefully home later today once arranged.        Active Hospital Problems    Diagnosis  POA    **Dysphagia [R13.10]  Yes    Severe malnutrition [E43]  Yes      Resolved Hospital Problems   No resolved problems to display.              Eduar Cornell MD  10/4/2024  07:41 EDT    Patient ready for discharge. Supplies provided. Discharge home. RTC with me in 2 weeks. No lifting > 30 lbs until RTC. May remove dressings in 24 hours, may shower at that time. Tubefeeding as instructed in addition to her post-fundoplication diet.    Eduar Cornell MD  15:12 EDT      "

## 2024-10-04 NOTE — DISCHARGE INSTR - ACTIVITY
No lifting over 30 lbs until follow up appointment   Remove dressing in 24 hours  May remove dressing and shower

## 2024-10-04 NOTE — OUTREACH NOTE
Prep Survey      Flowsheet Row Responses   [facility] patient discharged from? [place]   Is LACE score < 7 ? Yes   Eligibility [facility]   Date of Admission 10/03/24   Date of Discharge 10/04/24   Discharge Disposition Home-Health Care Sv   Discharge diagnosis Dysphagia   Does the patient have one of the following disease processes/diagnoses(primary or secondary)? Other   Does the patient have Home health ordered? Yes   What is the Home health agency?  [facility] for tube feeding and supplies   Is there a DME ordered? Yes   What DME was ordered? Boost and tube feeding supplies, family will  in [facility] office prior to d/c   Prep survey completed? Yes            Halina ELLIOTT - Registered Nurse

## 2024-10-04 NOTE — CASE MANAGEMENT/SOCIAL WORK
Case Management Discharge Note      Final Note: Probable discharge today. CM spoke to patient at bedside. Her plan is home with her spouse to transport. Confirmed with patient that [facility] brought supplies, tube feed and did teaching at bedside today. Patient states when discharged, she was instructed to go to [facility]'s office to pick-up the boost. Patient has the address and contact information, if needed. She denies any other equipment or discharge needs.         Selected Continued Care - Admitted Since 10/2/2024       Destination    No services have been selected for the patient.                Durable Medical Equipment    No services have been selected for the patient.                Dialysis/Infusion       Service Provider Selected Services Address Phone Fax Patient Preferred    [facility] [address], [place] [id number] [phone] --       Internal Comment last updated by Kassy Haque RN 10/4/2024 1316    Tube feed & supplies                          Home Medical Care    No services have been selected for the patient.                Therapy    No services have been selected for the patient.                Community Resources    No services have been selected for the patient.                Community & DME    No services have been selected for the patient.                    Transportation Services  Private: Car    Final Discharge Disposition Code: 01 - home or self-care

## 2024-10-04 NOTE — CONSULTS
Clinical Nutrition     Patient Name: Nely Castrejon  YOB: 1970  MRN: [id number]  Date of Encounter: 10/04/24 08:45 EDT  Admission date: 10/2/2024  Reason for Visit: Follow-up protocol, Physician consult, EN    Assessment   Nutrition Assessment   Admission Diagnosis:  Dysphagia [R13.10]    Problem List:    Dysphagia    Severe malnutrition      PMH:   She  has a past medical history of Arthritis, Atrial fibrillation, B12 deficiency (08/08/2016), Chronic cough, Fibromyalgia, primary, GERD (gastroesophageal reflux disease), History of osteoarthritis (08/01/2016), Hypertension, Pacemaker (2013), Sick sinus syndrome, Trochanteric bursitis of left hip (02/19/2021), and Vitamin B12 deficiency.    PSH:  She  has a past surgical history that includes Shoulder surgery (Left); Subtotal Hysterectomy; Pacemaker Implantation (Left, 2013); Colonoscopy (01/09/2020); Esophagogastroduodenoscopy; Esophagogastroduodenoscopy (N/A, 08/28/2023); Pacemaker Replacement (N/A, 01/03/2024); Laparoscopic cholecystectomy; Colectomy; Esophageal motility study (N/A, 06/20/2024); gastric ph monitoring 24hr (N/A, 06/20/2024); Nissen fundoplication (N/A, 9/10/2024); and Esophagogastroduodenoscopy w/ PEG (N/A, 10/3/2024).    Applicable Nutrition History:   (9/10/24) s/p Toupet Fundoplication  (10/3) s/p PEG placement    Labs    Labs Reviewed: Yes    Results from last 7 days   Lab Units 10/04/24  0404 10/03/24  0148 10/02/24  1103   GLUCOSE mg/dL 76 108* 88   BUN mg/dL 5* 9 19   CREATININE mg/dL 0.66 0.63 0.75   SODIUM mmol/L 140 139 141   CHLORIDE mmol/L 102 105 101   POTASSIUM mmol/L 3.8 4.4  4.4 3.5   ALT (SGPT) U/L  --   --  21       Results from last 7 days   Lab Units 10/02/24  1103   ALBUMIN g/dL 4.5           Lab Results   Lab Value Date/Time    HGBA1C 5.80 (H) 08/12/2024 0941    HGBA1C 5.7 (H) 08/11/2023 1622    HGBA1C 5.7 01/28/2019 1528               Medications    Medications Reviewed: yes    Scheduled  "Meds:chlorthalidone, 12.5 mg, Oral, Daily  docusate sodium, 100 mg, Oral, Daily  flecainide, 75 mg, Oral, BID  losartan, 50 mg, Oral, Q24H  pantoprazole, 40 mg, Oral, Q AM  sodium chloride, 10 mL, Intravenous, Q12H      Continuous Infusions:lactated ringers, 20 mL/hr, Last Rate: Stopped (10/03/24 1544)      PRN Meds:.  Calcium Replacement - Follow Nurse / BPA Driven Protocol    enalaprilat    HYDROcodone-acetaminophen    Magnesium Standard Dose Replacement - Follow Nurse / BPA Driven Protocol    ondansetron ODT    Phosphorus Replacement - Follow Nurse / BPA Driven Protocol    Potassium Replacement - Follow Nurse / BPA Driven Protocol    promethazine    simethicone    sodium chloride    sodium chloride    Intake/Ouptut 24 hrs (0701 - 0700)   I&O's Reviewed: yes      Anthropometrics     Height: Height: 160 cm (62.99\")  Last Filed Weight: Weight: 57.8 kg (127 lb 6.8 oz) (10/03/24 1422)  Method: Weight Method: Stated  BMI: BMI (Calculated): 22.6    UBW:    Weight      Weight (kg) Weight (lbs) Weight Method   9/12/2023 62.778 kg  138 lb 6.4 oz     1/2/2024 63.504 kg  140 lb  Standing scale    1/3/2024 63.413 kg  139 lb 12.8 oz  Standing scale    1/21/2024 63.05 kg  139 lb     4/9/2024 63.05 kg  139 lb     5/9/2024 63.05 kg  139 lb     8/12/2024 62.778 kg  138 lb 6.4 oz     9/4/2024 61.7 kg  136 lb 0.4 oz  Standing scale    9/10/2024 61.7 kg  136 lb 0.4 oz     9/20/2024 63.504 kg  140 lb  Standing scale    10/2/2024 57.788 kg  127 lb 6.4 oz  Standing scale      Weight change: weight loss of 9 lbs (6.6%) over 1 month(s)    Significant?  Yes    Nutrition Focused Physical Exam     Date: 10/3    Patient meets criteria for malnutrition diagnosis, see MSA note.     Subjective   Reported/Observed/Food/Nutrition Related History:   10/4  PEG placed yesterday. Diet advanced. Consult to initiate feeds and provide home recs.     10/3  Pt laying in bed, s/p fundoplication with subsequent dysphagia. Reports initially tolerating liquids " "and then attempted to take soft foods but experienced emesis event. Report intolerance of all PO intake since that time. Did not drink ONS following d/c. Awaiting PEG placement - ? Able to start PO attempts following placement. Endorsed unintentional weight loss since surgery. NKFA    Needs Assessment   Date: 10/3    Height used:Height: 160 cm (62.99\")160cm  Weights used: 57.8kg CBW    Estimated Calorie needs: ~ 1450 Kcal/day  Method: 25-30 Kcals/KG = 0604-0435  Method:  MSJ (1134) x 1.25 = 1418    Estimated Protein needs: ~ 87 g PRO/day  Method: 1.3-1.5g/Kg = 75-87    Estimated Fluid needs: ~ ml/day   Per clinical status    Current Nutrition Prescription     PO: Diet: Liquid; Clear Liquid; Post-Fundop Stage 1, No Straw, No Carbonated Beverages; Fluid Consistency: Thin (IDDSI 0)  Oral Nutrition Supplement: N/A  Intake: N/A    Assessment & Plan   Nutrition Diagnosis   Date:  10/3 Updated:  Problem Malnutrition, acute severe   Etiology Energy in < energy out in presence of altered GI fxn   Signs/Symptoms </=50% of EEN x >/=5d and significant weight loss of 6.6% x 1 month   Status: New    Date: 10/3 Updated: 10/4  Problem Inadequate oral intake    Etiology dysphagia   Signs/Symptoms Plan for PEG placement   Status: Active    Goal:   Nutrition to support treatment, Advance diet as medically feasible/appropriate, and Initiate EN      Nutrition Intervention      Follow treatment progress, Care plan reviewed, Await begin PO, Nutrition support order placed    Initiate Bolus feeds to ensure tolerance. Start with 125mL per bolus and increase to full carton on next bolus if initial feed tolerated.  See home recs below  EN order set now in place    Home bolus regimen recommendations  Formula: IsoSource 1.5  Total Cartons: 4  Total volume: 1000mL  Bolus Regimen: 4 cartons 4x daily (8a, 12p, 4p, 8p)  Water Flush: 80mL before and after each flush     At goal volume, this regimen provides: 1500kcal (103% est needs), 68g protein " (78% est needs), 15.2g fiber,  760mL TF FW (+ 640mL flush = 1400mL total)     Can decrease volume with PO tolerance  Would [facility]    Monitoring/Evaluation:   Per protocol, I&O, Pertinent labs, EN delivery/tolerance, GI status, Symptoms    Shanna Pichardo, MS,RD,LD  Time Spent: 30min

## 2024-10-04 NOTE — CASE MANAGEMENT/SOCIAL WORK
"Continued Stay Note  [facility]     Patient Name: Nely Castrejon  MRN: [id number]  Today's Date: 10/4/2024    Admit Date: 10/2/2024    Plan: Home with family   Discharge Plan       Row Name 10/04/24 0832       Plan    Plan Comments Consult received for home tubefeeding and supplies including gravity bag this morning.  Per Dr. Cornell's note, \" Await nutrition recs re; supplemental feeds and supplying patient with appropriate materials. Hopefully home later today once arranged\".  WIL has contacted Karlie DRAKE with [facility].  She will contact dietician & begin working on this. CM will update note once more information is received.                   Discharge Codes    No documentation.                       Kassy Haque RN    "

## 2024-10-07 ENCOUNTER — TRANSITIONAL CARE MANAGEMENT TELEPHONE ENCOUNTER (OUTPATIENT)
Dept: CALL CENTER | Facility: HOSPITAL | Age: 54
End: 2024-10-07
Payer: MEDICARE

## 2024-10-07 NOTE — OUTREACH NOTE
Call Center TCM Note      Flowsheet Row Responses   [facility] patient discharged from? [place]   Does the patient have one of the following disease processes/diagnoses(primary or secondary)? Other   TCM attempt successful? Yes   Call start time 1027   Call end time 1028   Discharge diagnosis Dysphagia   Person spoke with today (if not patient) and relationship patient   Meds reviewed with patient/caregiver? Yes   Prescription comments No changes to medication   Comments 10/11/2024 3:00 PM Arrive by 2:45 PM HOSPITAL FOLLOW UP 30 min [facility] Bailee Cabrera, MILENA   Does the patient have an appointment with their PCP within 7-14 days of discharge? Yes   What is the Home health agency?   [facility] for tube feeding and supplies   Has home health visited the patient within 72 hours of discharge? Yes   Psychosocial issues? No   Did the patient receive a copy of their discharge instructions? Yes   Nursing interventions Reviewed instructions with patient   What is the patient's perception of their health status since discharge? Improving   Is the patient/caregiver able to teach back signs and symptoms related to disease process for when to call PCP? Yes   Is the patient/caregiver able to teach back signs and symptoms related to disease process for when to call 911? Yes   Is the patient/caregiver able to teach back the hierarchy of who to call/visit for symptoms/problems? PCP, Specialist, Home health nurse, Urgent Care, ED, 911 Yes   TCM call completed? Yes   Wrap up additional comments Patient reports doing well. No s/s of infection noted. Tube feeds going well. No concerns or questions.   Call end time 1028   Would this patient benefit from a Referral to Amb Social Work? No   Is the patient interested in additional calls from an ambulatory ? No            Bailee Caballero RN    10/7/2024, 10:28 EDT

## 2024-10-23 ENCOUNTER — HOSPITAL ENCOUNTER (OUTPATIENT)
Dept: MAMMOGRAPHY | Facility: HOSPITAL | Age: 54
Discharge: HOME | End: 2024-10-23
Payer: MEDICARE

## 2024-10-23 ENCOUNTER — HOSPITAL ENCOUNTER (OUTPATIENT)
Dept: ULTRASOUND IMAGING | Facility: HOSPITAL | Age: 54
Discharge: HOME | End: 2024-10-23
Payer: MEDICARE

## 2024-10-23 DIAGNOSIS — N64.4 BREAST PAIN: ICD-10-CM

## 2024-10-23 DIAGNOSIS — M54.2 NECK PAIN ON LEFT SIDE: ICD-10-CM

## 2024-10-23 PROCEDURE — 76642 ULTRASOUND BREAST LIMITED: CPT

## 2024-10-23 PROCEDURE — 77066 DX MAMMO INCL CAD BI: CPT

## 2024-10-23 PROCEDURE — G0279 TOMOSYNTHESIS, MAMMO: HCPCS

## 2024-12-10 ENCOUNTER — TELEPHONE (OUTPATIENT)
Dept: CARDIOLOGY | Facility: CLINIC | Age: 54
End: 2024-12-10
Payer: MEDICARE
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
## 2025-05-22 ENCOUNTER — TELEPHONE (OUTPATIENT)
Dept: INTERNAL MEDICINE | Facility: CLINIC | Age: 55
End: 2025-05-22
Payer: MEDICARE

## 2025-05-22 NOTE — TELEPHONE ENCOUNTER
Caller: [facility]    Relationship: Other    Best call back number: SAMIA    What form or medical record are you requesting: CMN FORM WITH SIGNATURE    Who is requesting this form or medical record from you: [facility]    How would you like to receive the form or medical records (pick-up, mail, fax): FAX    If fax, what is the fax number: [phone]    Timeframe paperwork needed: ASAP    Additional notes: THIS WAS FAXED BUT THE SIGNATURE PART WAS CUT OFF.

## 2025-06-15 ENCOUNTER — HOSPITAL ENCOUNTER (EMERGENCY)
Facility: HOSPITAL | Age: 55
Discharge: HOME | End: 2025-06-15
Attending: HOSPITALIST
Payer: OTHER MISCELLANEOUS

## 2025-06-15 ENCOUNTER — APPOINTMENT (OUTPATIENT)
Dept: GENERAL RADIOLOGY | Facility: HOSPITAL | Age: 55
End: 2025-06-15
Payer: OTHER MISCELLANEOUS

## 2025-06-15 VITALS
WEIGHT: 105 LBS | RESPIRATION RATE: 16 BRPM | HEART RATE: 89 BPM | TEMPERATURE: 97.9 F | SYSTOLIC BLOOD PRESSURE: 119 MMHG | BODY MASS INDEX: 18.61 KG/M2 | HEIGHT: 63 IN | DIASTOLIC BLOOD PRESSURE: 99 MMHG | OXYGEN SATURATION: 100 %
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
## 2025-06-15 DIAGNOSIS — S29.9XXA CHEST WALL INJURY, INITIAL ENCOUNTER: ICD-10-CM

## 2025-06-15 PROCEDURE — 99285 EMERGENCY DEPT VISIT HI MDM: CPT

## 2025-06-15 PROCEDURE — 71046 X-RAY EXAM CHEST 2 VIEWS: CPT

## 2025-06-15 ASSESSMENT — LIFESTYLE VARIABLES
HOW MANY STANDARD DRINKS CONTAINING ALCOHOL DO YOU HAVE ON A TYPICAL DAY: PATIENT DOES NOT DRINK
HOW OFTEN DO YOU HAVE A DRINK CONTAINING ALCOHOL: NEVER

## 2025-06-15 ASSESSMENT — PAIN SCALES - GENERAL: PAINLEVEL_OUTOF10: 4

## 2025-06-15 ASSESSMENT — PAIN DESCRIPTION - LOCATION: LOCATION: CHEST;ABDOMEN
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
## 2025-06-15 NOTE — ED NOTES
Reviewed AVS with patient. Discussed follow-up needs and answered questions. Patient discharged, but remaining in the room with grandson.

## 2025-06-29 LAB
MDC_IDC_MSMT_BATTERY_REMAINING_LONGEVITY: 70 MO
MDC_IDC_MSMT_BATTERY_REMAINING_PERCENTAGE: 86 %
MDC_IDC_MSMT_BATTERY_RRT_TRIGGER: 2.6
MDC_IDC_MSMT_BATTERY_STATUS: NORMAL
MDC_IDC_MSMT_BATTERY_VOLTAGE: 2.99
MDC_IDC_MSMT_LEADCHNL_RA_DTM: NORMAL
MDC_IDC_MSMT_LEADCHNL_RA_IMPEDANCE_VALUE: 410
MDC_IDC_MSMT_LEADCHNL_RA_PACING_THRESHOLD_AMPLITUDE: 1
MDC_IDC_MSMT_LEADCHNL_RA_PACING_THRESHOLD_POLARITY: NORMAL
MDC_IDC_MSMT_LEADCHNL_RA_PACING_THRESHOLD_PULSEWIDTH: 0.5
MDC_IDC_MSMT_LEADCHNL_RA_SENSING_INTR_AMPL: 1.2
MDC_IDC_MSMT_LEADCHNL_RV_IMPEDANCE_VALUE: 160
MDC_IDC_MSMT_LEADCHNL_RV_PACING_THRESHOLD_POLARITY: NORMAL
MDC_IDC_MSMT_LEADCHNL_RV_SENSING_INTR_AMPL: 3.8
MDC_IDC_PG_IMPLANT_DTM: NORMAL
MDC_IDC_PG_MFG: NORMAL
MDC_IDC_PG_MODEL: NORMAL
MDC_IDC_PG_SERIAL: NORMAL
MDC_IDC_PG_TYPE: NORMAL
MDC_IDC_SESS_DTM: NORMAL
MDC_IDC_SESS_TYPE: NORMAL
MDC_IDC_SET_BRADY_AT_MODE_SWITCH_RATE: 180
MDC_IDC_SET_BRADY_LOWRATE: 75
MDC_IDC_SET_BRADY_MAX_SENSOR_RATE: 120
MDC_IDC_SET_BRADY_MAX_TRACKING_RATE: 105
MDC_IDC_SET_BRADY_MODE: NORMAL
MDC_IDC_SET_BRADY_PAV_DELAY: 250
MDC_IDC_SET_BRADY_SAV_DELAY: 200
MDC_IDC_SET_LEADCHNL_RA_PACING_AMPLITUDE: 2
MDC_IDC_SET_LEADCHNL_RA_PACING_POLARITY: NORMAL
MDC_IDC_SET_LEADCHNL_RA_PACING_PULSEWIDTH: 0.5
MDC_IDC_SET_LEADCHNL_RA_SENSING_POLARITY: NORMAL
MDC_IDC_SET_LEADCHNL_RA_SENSING_SENSITIVITY: 0.5
MDC_IDC_SET_LEADCHNL_RV_PACING_AMPLITUDE: 2.5
MDC_IDC_SET_LEADCHNL_RV_PACING_POLARITY: NORMAL
MDC_IDC_SET_LEADCHNL_RV_PACING_PULSEWIDTH: 1.5
MDC_IDC_SET_LEADCHNL_RV_SENSING_POLARITY: NORMAL
MDC_IDC_SET_LEADCHNL_RV_SENSING_SENSITIVITY: 2
MDC_IDC_STAT_AT_BURDEN_PERCENT: 1
MDC_IDC_STAT_BRADY_RA_PERCENT_PACED: 69
MDC_IDC_STAT_BRADY_RV_PERCENT_PACED: 3.6

## 2025-08-05 ENCOUNTER — TELEPHONE (OUTPATIENT)
Dept: CARDIOLOGY | Facility: CLINIC | Age: 55
End: 2025-08-05
Payer: MEDICARE

## 2025-08-14 ENCOUNTER — OFFICE VISIT (OUTPATIENT)
Dept: INTERNAL MEDICINE | Facility: CLINIC | Age: 55
End: 2025-08-14
Payer: MEDICARE

## 2025-08-14 VITALS
BODY MASS INDEX: 18.92 KG/M2 | WEIGHT: 106.8 LBS | HEART RATE: 78 BPM | TEMPERATURE: 97.9 F | SYSTOLIC BLOOD PRESSURE: 112 MMHG | HEIGHT: 63 IN | DIASTOLIC BLOOD PRESSURE: 70 MMHG | OXYGEN SATURATION: 98 %

## 2025-08-14 DIAGNOSIS — I10 PRIMARY HYPERTENSION: ICD-10-CM

## 2025-08-14 DIAGNOSIS — R63.4 UNINTENDED WEIGHT LOSS: ICD-10-CM

## 2025-08-14 DIAGNOSIS — R79.9 ABNORMAL BLOOD CHEMISTRY: ICD-10-CM

## 2025-08-14 DIAGNOSIS — Z13.6 ENCOUNTER FOR SCREENING FOR CARDIOVASCULAR DISORDERS: ICD-10-CM

## 2025-08-14 DIAGNOSIS — Z23 NEED FOR PNEUMOCOCCAL VACCINATION: ICD-10-CM

## 2025-08-14 DIAGNOSIS — Z12.31 ENCOUNTER FOR SCREENING MAMMOGRAM FOR BREAST CANCER: ICD-10-CM

## 2025-08-14 DIAGNOSIS — Z00.01 ENCOUNTER FOR MEDICARE ANNUAL EXAMINATION WITH ABNORMAL FINDINGS: ICD-10-CM

## 2025-08-14 DIAGNOSIS — M25.50 POLYARTHRALGIA: ICD-10-CM

## 2025-08-14 DIAGNOSIS — Z98.890 HISTORY OF FUNDOPLICATION: ICD-10-CM

## 2025-08-14 DIAGNOSIS — Z00.00 MEDICARE ANNUAL WELLNESS VISIT, SUBSEQUENT: Primary | ICD-10-CM

## 2025-08-14 DIAGNOSIS — Z87.39 PERSONAL HISTORY OF RHEUMATOID ARTHRITIS: ICD-10-CM

## 2025-08-14 DIAGNOSIS — R19.4 CHANGE IN BOWEL HABITS: ICD-10-CM

## 2025-08-14 DIAGNOSIS — I48.0 PAROXYSMAL ATRIAL FIBRILLATION: ICD-10-CM

## 2025-08-14 DIAGNOSIS — E46 MALNUTRITION, UNSPECIFIED TYPE: ICD-10-CM

## 2025-08-18 LAB
25(OH)D3+25(OH)D2 SERPL-MCNC: 105 NG/ML (ref 30–100)
ALBUMIN SERPL-MCNC: 4.6 G/DL (ref 3.5–5.2)
ALBUMIN/GLOB SERPL: 1.9 G/DL
ALP SERPL-CCNC: 119 U/L (ref 39–117)
ALT SERPL-CCNC: 21 U/L (ref 1–33)
ANA SER QL IF: NEGATIVE
AST SERPL-CCNC: 23 U/L (ref 1–32)
BAKER'S YEAST IGA QN: <20 UNITS (ref 0–24.9)
BAKER'S YEAST IGG QN: 54.7 UNITS (ref 0–24.9)
BILIRUB SERPL-MCNC: 0.6 MG/DL (ref 0–1.2)
BUN SERPL-MCNC: 19 MG/DL (ref 6–20)
BUN/CREAT SERPL: 24.1 (ref 7–25)
CALCIUM SERPL-MCNC: 10.1 MG/DL (ref 8.6–10.5)
CCP IGA+IGG SERPL IA-ACNC: 5 UNITS (ref 0–19)
CHLORIDE SERPL-SCNC: 103 MMOL/L (ref 98–107)
CHOLEST SERPL-MCNC: 202 MG/DL (ref 0–200)
CO2 SERPL-SCNC: 29.9 MMOL/L (ref 22–29)
CREAT SERPL-MCNC: 0.79 MG/DL (ref 0.57–1)
CRP SERPL-MCNC: <0.3 MG/DL (ref 0–0.5)
EGFRCR SERPLBLD CKD-EPI 2021: 87.9 ML/MIN/1.73
ERYTHROCYTE [DISTWIDTH] IN BLOOD BY AUTOMATED COUNT: 12.6 % (ref 12.3–15.4)
ERYTHROCYTE [SEDIMENTATION RATE] IN BLOOD BY WESTERGREN METHOD: 6 MM/HR (ref 0–30)
FERRITIN SERPL-MCNC: 134 NG/ML (ref 13–150)
FOLATE SERPL-MCNC: 6.17 NG/ML (ref 4.78–24.2)
GLOBULIN SER CALC-MCNC: 2.4 GM/DL
GLUCOSE SERPL-MCNC: 106 MG/DL (ref 65–99)
HCT VFR BLD AUTO: 39.2 % (ref 34–46.6)
HDLC SERPL-MCNC: 87 MG/DL (ref 40–60)
HGB BLD-MCNC: 12.9 G/DL (ref 12–15.9)
IRON SATN MFR SERPL: 22 % (ref 20–50)
IRON SERPL-MCNC: 88 MCG/DL (ref 37–145)
LABORATORY COMMENT REPORT: NORMAL
LDLC SERPL CALC-MCNC: 102 MG/DL (ref 0–100)
LPA SERPL-SCNC: 86.3 NMOL/L
MCH RBC QN AUTO: 28.8 PG (ref 26.6–33)
MCHC RBC AUTO-ENTMCNC: 32.9 G/DL (ref 31.5–35.7)
MCV RBC AUTO: 87.5 FL (ref 79–97)
P-ANCA ATYPICAL TITR SER IF: ABNORMAL TITER
PLATELET # BLD AUTO: 217 10*3/MM3 (ref 140–450)
POTASSIUM SERPL-SCNC: 4.1 MMOL/L (ref 3.5–5.2)
PROT SERPL-MCNC: 7 G/DL (ref 6–8.5)
PYRIDOXAL PHOS SERPL-MCNC: 34.1 UG/L (ref 3.4–65.2)
RBC # BLD AUTO: 4.48 10*6/MM3 (ref 3.77–5.28)
RHEUMATOID FACT SERPL-ACNC: <10 IU/ML
SODIUM SERPL-SCNC: 142 MMOL/L (ref 136–145)
T4 FREE SERPL-MCNC: 0.95 NG/DL (ref 0.92–1.68)
TIBC SERPL-MCNC: 404 MCG/DL
TRIGL SERPL-MCNC: 71 MG/DL (ref 0–150)
TSH SERPL DL<=0.005 MIU/L-ACNC: 1.94 UIU/ML (ref 0.27–4.2)
UIBC SERPL-MCNC: 316 MCG/DL (ref 112–346)
VIT B12 SERPL-MCNC: >2000 PG/ML (ref 211–946)
VLDLC SERPL CALC-MCNC: 13 MG/DL (ref 5–40)
WBC # BLD AUTO: 7.42 10*3/MM3 (ref 3.4–10.8)

## 2025-08-20 PROBLEM — E78.41 ELEVATED LIPOPROTEIN(A): Status: ACTIVE | Noted: 2025-08-20

## 2025-08-25 ENCOUNTER — OFFICE VISIT (OUTPATIENT)
Dept: GASTROENTEROLOGY | Facility: CLINIC | Age: 55
End: 2025-08-25
Payer: MEDICARE

## 2025-08-25 VITALS
OXYGEN SATURATION: 98 % | BODY MASS INDEX: 19.31 KG/M2 | WEIGHT: 109 LBS | SYSTOLIC BLOOD PRESSURE: 128 MMHG | HEIGHT: 63 IN | DIASTOLIC BLOOD PRESSURE: 92 MMHG | HEART RATE: 78 BPM

## 2025-08-25 DIAGNOSIS — R19.4 CHANGE IN BOWEL HABITS: Chronic | ICD-10-CM

## 2025-08-25 DIAGNOSIS — R63.4 WEIGHT LOSS: Chronic | ICD-10-CM

## 2025-08-25 DIAGNOSIS — R10.30 LOWER ABDOMINAL PAIN: Chronic | ICD-10-CM

## 2025-08-25 DIAGNOSIS — R19.7 DIARRHEA, UNSPECIFIED TYPE: Primary | Chronic | ICD-10-CM

## 2025-08-25 DIAGNOSIS — R85.7 ABNORMAL INFLAMMATORY BOWEL DISEASE PANEL: Chronic | ICD-10-CM

## 2025-08-25 DIAGNOSIS — K21.9 GASTROESOPHAGEAL REFLUX DISEASE, UNSPECIFIED WHETHER ESOPHAGITIS PRESENT: Chronic | ICD-10-CM

## 2025-08-25 DIAGNOSIS — Z12.11 ENCOUNTER FOR SCREENING FOR MALIGNANT NEOPLASM OF COLON: Chronic | ICD-10-CM

## 2025-08-25 DIAGNOSIS — R13.19 ESOPHAGEAL DYSPHAGIA: Chronic | ICD-10-CM

## 2025-08-25 PROCEDURE — 1160F RVW MEDS BY RX/DR IN RCRD: CPT | Performed by: NURSE PRACTITIONER

## 2025-08-25 PROCEDURE — 3080F DIAST BP >= 90 MM HG: CPT | Performed by: NURSE PRACTITIONER

## 2025-08-25 PROCEDURE — 3074F SYST BP LT 130 MM HG: CPT | Performed by: NURSE PRACTITIONER

## 2025-08-25 PROCEDURE — 1159F MED LIST DOCD IN RCRD: CPT | Performed by: NURSE PRACTITIONER

## 2025-08-25 PROCEDURE — 99214 OFFICE O/P EST MOD 30 MIN: CPT | Performed by: NURSE PRACTITIONER

## 2025-08-25 RX ORDER — BISACODYL 5 MG/1
TABLET, DELAYED RELEASE ORAL
Qty: 4 TABLET | Refills: 0 | Status: SHIPPED | OUTPATIENT
Start: 2025-08-25 | End: 2025-08-27 | Stop reason: HOSPADM

## 2025-08-25 RX ORDER — SODIUM CHLORIDE 9 MG/ML
70 INJECTION, SOLUTION INTRAVENOUS CONTINUOUS PRN
Status: CANCELLED | OUTPATIENT
Start: 2025-08-25 | End: 2025-08-26

## 2025-08-25 RX ORDER — POLYETHYLENE GLYCOL 3350 17 G/17G
POWDER, FOR SOLUTION ORAL
Qty: 238 G | Refills: 0 | Status: SHIPPED | OUTPATIENT
Start: 2025-08-25 | End: 2025-08-27 | Stop reason: HOSPADM

## 2025-08-27 ENCOUNTER — ANESTHESIA EVENT (OUTPATIENT)
Dept: GASTROENTEROLOGY | Facility: HOSPITAL | Age: 55
End: 2025-08-27
Payer: MEDICARE

## 2025-08-27 ENCOUNTER — LAB (OUTPATIENT)
Dept: LAB | Facility: HOSPITAL | Age: 55
End: 2025-08-27
Payer: MEDICARE

## 2025-08-27 ENCOUNTER — ANESTHESIA (OUTPATIENT)
Dept: GASTROENTEROLOGY | Facility: HOSPITAL | Age: 55
End: 2025-08-27
Payer: MEDICARE

## 2025-08-27 ENCOUNTER — HOSPITAL ENCOUNTER (OUTPATIENT)
Facility: HOSPITAL | Age: 55
Setting detail: HOSPITAL OUTPATIENT SURGERY
Discharge: HOME | End: 2025-08-27
Attending: INTERNAL MEDICINE | Admitting: INTERNAL MEDICINE
Payer: MEDICARE

## 2025-08-27 VITALS
TEMPERATURE: 97.2 F | RESPIRATION RATE: 16 BRPM | OXYGEN SATURATION: 100 % | DIASTOLIC BLOOD PRESSURE: 85 MMHG | HEART RATE: 75 BPM | SYSTOLIC BLOOD PRESSURE: 116 MMHG

## 2025-08-27 DIAGNOSIS — R19.7 DIARRHEA, UNSPECIFIED TYPE: ICD-10-CM

## 2025-08-27 DIAGNOSIS — Z12.11 ENCOUNTER FOR SCREENING FOR MALIGNANT NEOPLASM OF COLON: ICD-10-CM

## 2025-08-27 DIAGNOSIS — R85.7 ABNORMAL INFLAMMATORY BOWEL DISEASE PANEL: ICD-10-CM

## 2025-08-27 PROCEDURE — 25010000002 LIDOCAINE (CARDIAC): Performed by: NURSE ANESTHETIST, CERTIFIED REGISTERED

## 2025-08-27 PROCEDURE — 25010000002 PROPOFOL 10 MG/ML EMULSION: Performed by: NURSE ANESTHETIST, CERTIFIED REGISTERED

## 2025-08-27 PROCEDURE — 25810000003 SODIUM CHLORIDE 0.9 % SOLUTION: Performed by: NURSE PRACTITIONER

## 2025-08-27 PROCEDURE — 87507 IADNA-DNA/RNA PROBE TQ 12-25: CPT | Performed by: NURSE PRACTITIONER

## 2025-08-27 PROCEDURE — 83993 ASSAY FOR CALPROTECTIN FECAL: CPT | Performed by: NURSE PRACTITIONER

## 2025-08-27 PROCEDURE — 87493 C DIFF AMPLIFIED PROBE: CPT | Performed by: NURSE PRACTITIONER

## 2025-08-27 RX ORDER — COLESTIPOL HYDROCHLORIDE 1 G/1
1 TABLET ORAL 2 TIMES DAILY
Qty: 60 TABLET | Refills: 5 | Status: SHIPPED | OUTPATIENT
Start: 2025-08-27

## 2025-08-27 RX ORDER — SIMETHICONE 40MG/0.6ML
SUSPENSION, DROPS(FINAL DOSAGE FORM)(ML) ORAL AS NEEDED
Status: DISCONTINUED | OUTPATIENT
Start: 2025-08-27 | End: 2025-08-27 | Stop reason: HOSPADM

## 2025-08-27 RX ORDER — SODIUM CHLORIDE 9 MG/ML
70 INJECTION, SOLUTION INTRAVENOUS CONTINUOUS PRN
Status: DISCONTINUED | OUTPATIENT
Start: 2025-08-27 | End: 2025-08-27 | Stop reason: HOSPADM

## 2025-08-27 RX ORDER — PROPOFOL 10 MG/ML
VIAL (ML) INTRAVENOUS CONTINUOUS PRN
Status: DISCONTINUED | OUTPATIENT
Start: 2025-08-27 | End: 2025-08-27 | Stop reason: SURG

## 2025-08-27 RX ADMIN — LIDOCAINE HYDROCHLORIDE 60 MG: 20 INJECTION, SOLUTION INTRAVENOUS at 13:46

## 2025-08-27 RX ADMIN — PROPOFOL 200 MCG/KG/MIN: 10 INJECTION, EMULSION INTRAVENOUS at 13:46

## 2025-08-27 RX ADMIN — SODIUM CHLORIDE 70 ML/HR: 9 INJECTION, SOLUTION INTRAVENOUS at 10:37

## 2025-08-28 ENCOUNTER — PATIENT ROUNDING (BHMG ONLY) (OUTPATIENT)
Dept: GASTROENTEROLOGY | Facility: CLINIC | Age: 55
End: 2025-08-28
Payer: MEDICARE

## 2025-08-29 LAB — REF LAB TEST METHOD: NORMAL

## (undated) DEVICE — ENDOPATH XCEL UNIVERSAL TROCAR STABLILITY SLEEVES: Brand: ENDOPATH XCEL

## (undated) DEVICE — ADULT, W/LG. BACK PAD, RADIOTRANSPARENT ELEMENT AND LEAD WIRE COMPATIBLE W/: Brand: DEFIBRILLATION ELECTRODES

## (undated) DEVICE — Device: Brand: DEFENDO AIR/WATER/SUCTION AND BIOPSY VALVE

## (undated) DEVICE — DECANT BG O JET

## (undated) DEVICE — PLASMABLADE PS210-030S 3.0S LOCK: Brand: PLASMABLADE™

## (undated) DEVICE — VLV SXN AIR/H2O ORCAPOD3 1P/U STRL

## (undated) DEVICE — ADULT NASAL CO2 SAMPLING WITH O2 DELIVERY CANNULA FOR CAPNOFLEX MODULE: Brand: VITAL SIGNS™

## (undated) DEVICE — LUBE JELLY PK/2.75GM STRL BX/144

## (undated) DEVICE — GLV SURG SENSICARE PI MIC PF SZ7 LF STRL

## (undated) DEVICE — SOL NACL 0.9PCT 1000ML

## (undated) DEVICE — Device

## (undated) DEVICE — INCISIONLINE PLEDGET SFT 22X1 2.75MM

## (undated) DEVICE — PK SOL VISC ESOPH 80ML

## (undated) DEVICE — ENDOCUT SCISSOR TIP, DISPOSABLE: Brand: RENEW

## (undated) DEVICE — FRCP BX RADJAW4 NDL 2.8 240 STD OG

## (undated) DEVICE — TUBING, SUCTION, 1/4" X 10', STRAIGHT: Brand: MEDLINE

## (undated) DEVICE — ENDOPATH XCEL BLADELESS TROCARS WITH STABILITY SLEEVES: Brand: ENDOPATH XCEL

## (undated) DEVICE — GLV SURG SENSICARE PI ORTHO SZ7.5 LF STRL

## (undated) DEVICE — "MH-443 SUCTION VALVE F/EVIS140 EVIS160": Brand: SUCTION VALVE

## (undated) DEVICE — LIMB HOLDER, WRIST/ANKLE: Brand: DEROYAL

## (undated) DEVICE — CONMED SCOPE SAVER BITE BLOCK, 20X27 MM: Brand: SCOPE SAVER

## (undated) DEVICE — CAUTERY TIP POLISHER: Brand: DEVON

## (undated) DEVICE — GLV SURG SENSICARE W/ALOE PF LF 6.5 STRL

## (undated) DEVICE — KT BIOGUARD SXN VLV AIR/H20 4PC DISP

## (undated) DEVICE — INTRO ACCSR BLNT TP

## (undated) DEVICE — TBG PENCL TELESCP MEGADYNE SMOKE EVAC 10FT

## (undated) DEVICE — INTENDED USE FOR SURGICAL MARKING ON INTACT SKIN, ALSO PROVIDES A PERMANENT METHOD OF IDENTIFYING OBJECTS IN THE OPERATING ROOM: Brand: WRITESITE® REGULAR TIP SKIN MARKER

## (undated) DEVICE — NDL PERC 1PART ECHOTIP WO/BASEPLT 18G 7CM

## (undated) DEVICE — SYR LUER SLPTP 50ML

## (undated) DEVICE — SUCTION CANISTER, 1500CC, RIGID: Brand: DEROYAL

## (undated) DEVICE — SUT SILK 0 SH 30IN K834H

## (undated) DEVICE — YANKAUER,BULB TIP, NO VENT: Brand: ARGYLE

## (undated) DEVICE — KT PEG ENDOVIVE ENFIT STD PULL 20F 1P/U

## (undated) DEVICE — GLV SURG SENSICARE PI MIC PF SZ7.5 LF STRL

## (undated) DEVICE — TRAP FLD MINIVAC MEGADYNE 100ML

## (undated) DEVICE — ESOPHAGEAL BALLOON DILATATION CATHETER: Brand: CRE FIXED WIRE

## (undated) DEVICE — PENROSE DRAIN 18 X .5" SILICONE: Brand: MEDLINE

## (undated) DEVICE — LEX ELECTRO PHYSIOLOGY: Brand: MEDLINE INDUSTRIES, INC.

## (undated) DEVICE — APPL CHLORAPREP TINTED 26ML TEAL

## (undated) DEVICE — PATIENT RETURN ELECTRODE, SINGLE-USE, CONTACT QUALITY MONITORING, ADULT, WITH 9FT CORD, FOR PATIENTS WEIGING OVER 33LBS. (15KG): Brand: MEGADYNE

## (undated) DEVICE — NEEDLE,HYPODERM,SAFETY, 25GX1.5": Brand: MEDLINE

## (undated) DEVICE — Device: Brand: AIR/WATER CHANNEL CLEANING ADAPTER

## (undated) DEVICE — PK LAP LASR CHOLE 10

## (undated) DEVICE — JELLY,LUBE,STERILE,FLIP TOP,TUBE,2-OZ: Brand: MEDLINE

## (undated) DEVICE — ELECTRD RETRN/GRND MEGADYNE SGL/PLT W/CORD 9FT DISP

## (undated) DEVICE — IRRIGATOR BULB ASEPTO 60CC STRL

## (undated) DEVICE — ANTIBACTERIAL UNDYED BRAIDED (POLYGLACTIN 910), SYNTHETIC ABSORBABLE SUTURE: Brand: COATED VICRYL

## (undated) DEVICE — MARYLAND JAW LAPAROSCOPIC SEALER/DIVIDER COATED: Brand: LIGASURE

## (undated) DEVICE — ST EXT IV SMRTSTE 2VLV FIX M LL 6ML 41

## (undated) DEVICE — DEV INFL ALLIANCE2 SYS

## (undated) DEVICE — [HIGH FLOW INSUFFLATOR,  DO NOT USE IF PACKAGE IS DAMAGED,  KEEP DRY,  KEEP AWAY FROM SUNLIGHT,  PROTECT FROM HEAT AND RADIOACTIVE SOURCES.]: Brand: PNEUMOSURE

## (undated) DEVICE — ST INF PRI SMRTSTE 20DRP 2VLV 24ML 117

## (undated) DEVICE — SUT SILK 2/0 SH 30IN K833H

## (undated) DEVICE — GOWN SURG ORBIS LVL3 2XL STRL

## (undated) DEVICE — SET PRIMARY GRVTY 10DP MALE LL 104IN

## (undated) DEVICE — BG DRN ENTERNAL W/ENFIT/CONN PVC 1000ML 150CM/TBG

## (undated) DEVICE — PDS II VLT 0 107CM AG ST3: Brand: ENDOLOOP

## (undated) DEVICE — THE BITE BLOCK MAXI, LATEX FREE STRAP IS USED TO PROTECT THE ENDOSCOPE INSERTION TUBE FROM BEING BITTEN BY THE PATIENT.

## (undated) DEVICE — HYBRID CO2 TUBING/CAP SET FOR OLYMPUS® SCOPES & CO2 SOURCE: Brand: ERBE

## (undated) DEVICE — CANISTER, RIGID, 2000CC: Brand: MEDLINE INDUSTRIES, INC.

## (undated) DEVICE — MEDI-VAC YANKAUER SUCTION HANDLE W/BULBOUS TIP: Brand: CARDINAL HEALTH

## (undated) DEVICE — LAPAROVUE VISIBILITY SYSTEM LAPAROSCOPIC SOLUTIONS: Brand: LAPAROVUE

## (undated) DEVICE — ENDOSCOPY PORT CONNECTOR FOR OLYMPUS® SCOPES: Brand: ERBE

## (undated) DEVICE — DRSNG SURG AQUACEL AG/ADVNTGE 9X15CM 3.5X6IN

## (undated) DEVICE — LAPAROSCOPIC SMOKE FILTRATION SYSTEM: Brand: PALL LAPAROSHIELD® PLUS LAPAROSCOPIC SMOKE FILTRATION SYSTEM

## (undated) DEVICE — DRESSING,OPTIFOAM,NON-ADHESIVE,4X4: Brand: MEDLINE